# Patient Record
Sex: MALE | Race: WHITE | NOT HISPANIC OR LATINO | Employment: OTHER | URBAN - METROPOLITAN AREA
[De-identification: names, ages, dates, MRNs, and addresses within clinical notes are randomized per-mention and may not be internally consistent; named-entity substitution may affect disease eponyms.]

---

## 2017-01-17 ENCOUNTER — OFFICE VISIT (OUTPATIENT)
Dept: FAMILY MEDICINE | Facility: CLINIC | Age: 27
End: 2017-01-17
Payer: COMMERCIAL

## 2017-01-17 VITALS
SYSTOLIC BLOOD PRESSURE: 120 MMHG | DIASTOLIC BLOOD PRESSURE: 65 MMHG | WEIGHT: 222 LBS | RESPIRATION RATE: 18 BRPM | BODY MASS INDEX: 33.65 KG/M2 | TEMPERATURE: 97.7 F | HEIGHT: 68 IN | HEART RATE: 70 BPM

## 2017-01-17 DIAGNOSIS — R22.2 MASS ON BACK: Primary | ICD-10-CM

## 2017-01-17 PROCEDURE — 99214 OFFICE O/P EST MOD 30 MIN: CPT | Performed by: FAMILY MEDICINE

## 2017-01-17 ASSESSMENT — ANXIETY QUESTIONNAIRES
7. FEELING AFRAID AS IF SOMETHING AWFUL MIGHT HAPPEN: NOT AT ALL
IF YOU CHECKED OFF ANY PROBLEMS ON THIS QUESTIONNAIRE, HOW DIFFICULT HAVE THESE PROBLEMS MADE IT FOR YOU TO DO YOUR WORK, TAKE CARE OF THINGS AT HOME, OR GET ALONG WITH OTHER PEOPLE: NOT DIFFICULT AT ALL
1. FEELING NERVOUS, ANXIOUS, OR ON EDGE: NOT AT ALL
2. NOT BEING ABLE TO STOP OR CONTROL WORRYING: NOT AT ALL
GAD7 TOTAL SCORE: 0
5. BEING SO RESTLESS THAT IT IS HARD TO SIT STILL: NOT AT ALL
6. BECOMING EASILY ANNOYED OR IRRITABLE: NOT AT ALL
3. WORRYING TOO MUCH ABOUT DIFFERENT THINGS: NOT AT ALL

## 2017-01-17 ASSESSMENT — PATIENT HEALTH QUESTIONNAIRE - PHQ9: 5. POOR APPETITE OR OVEREATING: NOT AT ALL

## 2017-01-17 NOTE — MR AVS SNAPSHOT
After Visit Summary   1/17/2017    Marvin Alves    MRN: [id number]           Patient Information     Date Of Birth          1990        Visit Information        Provider Department      1/17/2017 3:40 PM Kannan Emery MD [facility]        Today's Diagnoses     Mass on back    -  1        Follow-ups after your visit        Additional Services     GENERAL SURG ADULT REFERRAL       [place] [facility] [place] [facility] [phone]   [email or web address]    Please be aware that coverage of these services is subject to the terms and limitations of your health insurance plan.  Call member services at your health plan with any benefit or coverage questions.      Please bring the following with you to your appointment:    (1) Any X-Rays, CTs or MRIs which have been performed.  Contact the facility where they were done to arrange for  prior to your scheduled appointment.   (2) List of current medications   (3) This referral request   (4) Any documents/labs given to you for this referral                  Who to contact     If you have questions or need follow up information about today's clinic visit or your schedule please contact [facility] directly at [phone].  Normal or non-critical lab and imaging results will be communicated to you by MyChart, letter or phone within 4 business days after the clinic has received the results. If you do not hear from us within 7 days, please contact the clinic through MyChart or phone. If you have a critical or abnormal lab result, we will notify you by phone as soon as possible.  Submit refill requests through TrumpIT or call your pharmacy and they will forward the refill request to us. Please allow 3 business days for your refill to be completed.          Additional Information About Your Visit        VTEXhart Information     TrumpIT lets you send  "messages to your doctor, view your test results, renew your prescriptions, schedule appointments and more. To sign up, go to [email or web address] . Click on \"Log in\" on the left side of the screen, which will take you to the Welcome page. Then click on \"Sign up Now\" on the right side of the page.     You will be asked to enter the access code listed below, as well as some personal information. Please follow the directions to create your username and password.     Your access code is: [email or web address]  Expires: 2017  3:58 PM     Your access code will  in 90 days. If you need help or a new code, please call your [place] clinic or [phone].        Care EveryWhere ID     This is your [facility] EveryWhere ID. This could be used by other organizations to access your [place] medical records  [id number]        Your Vitals Were     Pulse Temperature Respirations Height BMI (Body Mass Index)       70 97.7  F (36.5  C) 18 5' 8.25\" (1.734 m) 33.49 kg/m2        Blood Pressure from Last 3 Encounters:   17 120/65   16 128/76   16 120/72    Weight from Last 3 Encounters:   17 222 lb (100.699 kg)   16 228 lb (103.42 kg)   16 231 lb 11.2 oz (105.098 kg)              We Performed the Following     GENERAL SURG ADULT REFERRAL          Today's Medication Changes          These changes are accurate as of: 17  4:03 PM.  If you have any questions, ask your nurse or doctor.               Stop taking these medicines if you haven't already. Please contact your care team if you have questions.     escitalopram 10 MG tablet   Commonly known as:  LEXAPRO   Stopped by:  Kannan Emery MD           LORazepam 1 MG tablet   Commonly known as:  ATIVAN   Stopped by:  Kannan Emery MD                    Primary Care Provider Office Phone # Fax #    Amelia Mayra Howard [phone]       [address] [address]        Thank you!     Thank you " for choosing [facility]  for your care. Our goal is always to provide you with excellent care. Hearing back from our patients is one way we can continue to improve our services. Please take a few minutes to complete the written survey that you may receive in the mail after your visit with us. Thank you!             Your Updated Medication List - Protect others around you: Learn how to safely use, store and throw away your medicines at www.disposemymeds.org.          This list is accurate as of: 1/17/17  4:03 PM.  Always use your most recent med list.                   Brand Name Dispense Instructions for use    NO ACTIVE MEDICATIONS      .

## 2017-01-17 NOTE — PROGRESS NOTES
SUBJECTIVE:                                                    Marvin Alves is a 26 year old male who presents to clinic today for the following health issues:      Chief Complaint   Patient presents with     Derm Problem     patient has a lump on his left shoulder blade he first noticed the lump about 5 days ago .             Problem list and histories reviewed & adjusted, as indicated.  Additional history:     Further history obtained, clarified or corrected by physician:  He has a lump on the left upper back which he thinks is new. He does get a little tingling in the back and shoulder and arm area which he thinks is from this.    OBJECTIVE:  LUNGS: clear to auscultation, normal breath sounds  CV: RRR without murmur  ABD: BS+, soft, nontender, no masses, no hepatosplenomegaly  BACK: he left upper back just medial to the scapula reveals a subcutaneous mass that is smooth and firm and mobile. It is about 3 cm in diameter.    ASSESSMENT:  Mass on back    PLAN:  Orders Placed This Encounter     GENERAL SURG ADULT REFERRAL

## 2017-01-18 ASSESSMENT — PATIENT HEALTH QUESTIONNAIRE - PHQ9: SUM OF ALL RESPONSES TO PHQ QUESTIONS 1-9: 0

## 2017-01-18 ASSESSMENT — ANXIETY QUESTIONNAIRES: GAD7 TOTAL SCORE: 0

## 2017-01-27 ENCOUNTER — OFFICE VISIT (OUTPATIENT)
Dept: SURGERY | Facility: CLINIC | Age: 27
End: 2017-01-27
Payer: COMMERCIAL

## 2017-01-27 VITALS
HEIGHT: 69 IN | BODY MASS INDEX: 32.58 KG/M2 | DIASTOLIC BLOOD PRESSURE: 74 MMHG | SYSTOLIC BLOOD PRESSURE: 124 MMHG | WEIGHT: 220 LBS | TEMPERATURE: 97.9 F | HEART RATE: 62 BPM

## 2017-01-27 DIAGNOSIS — D17.1 LIPOMA OF BACK: Primary | ICD-10-CM

## 2017-01-27 PROCEDURE — 99202 OFFICE O/P NEW SF 15 MIN: CPT | Performed by: SURGERY

## 2017-01-27 NOTE — MR AVS SNAPSHOT
"              After Visit Summary   1/27/2017    Marvin Alves    MRN: [id number]           Patient Information     Date Of Birth          1990        Visit Information        Provider Department      1/27/2017 8:30 AM Talat Nava MD [facility]        Care Instructions    Per Dr. Nava's instructions        Follow-ups after your visit        Your next 10 appointments already scheduled     Jan 27, 2017  8:30 AM   [facility] Visit with [facility]   [facility]    [address] [id number] [phone]              Who to contact     If you have questions or need follow up information about today's clinic visit or your schedule please contact [facility] directly at [phone].  Normal or non-critical lab and imaging results will be communicated to you by MyChart, letter or phone within 4 business days after the clinic has received the results. If you do not hear from us within 7 days, please contact the clinic through MyChart or phone. If you have a critical or abnormal lab result, we will notify you by phone as soon as possible.  Submit refill requests through leemail or call your pharmacy and they will forward the refill request to us. Please allow 3 business days for your refill to be completed.          Additional Information About Your Visit        MyChart Information     leemail lets you send messages to your doctor, view your test results, renew your prescriptions, schedule appointments and more. To sign up, go to [email or web address] . Click on \"Log in\" on the left side of the screen, which will take you to the Welcome page. Then click on \"Sign up Now\" on the right side of the page.     You will be asked to enter the access code listed below, as well as some personal information. Please follow the directions to create your username and password.     Your access code is: [email or web address]  Expires: 4/17/2017  3:58 " "PM     Your access code will  in 90 days. If you need help or a new code, please call your [place] clinic or [phone].        Care EveryWhere ID     This is your Care EveryWhere ID. This could be used by other organizations to access your [place] medical records  [id number]        Your Vitals Were     Pulse Temperature Height BMI (Body Mass Index)          62 97.9  F (36.6  C) (Oral) 1.753 m (5' 9\") 32.47 kg/m2         Blood Pressure from Last 3 Encounters:   17 124/74   17 120/65   16 128/76    Weight from Last 3 Encounters:   17 99.791 kg (220 lb)   17 100.699 kg (222 lb)   16 103.42 kg (228 lb)              Today, you had the following     No orders found for display       Primary Care Provider Office Phone # Fax #    Amelia Mayra Howard [phone]       [facility] [address] [address] [facility] [address]        Thank you!     Thank you for choosing [facility]  for your care. Our goal is always to provide you with excellent care. Hearing back from our patients is one way we can continue to improve our services. Please take a few minutes to complete the written survey that you may receive in the mail after your visit with us. Thank you!             Your Updated Medication List - Protect others around you: Learn how to safely use, store and throw away your medicines at www.disposemymeds.org.          This list is accurate as of: 17  8:29 AM.  Always use your most recent med list.                   Brand Name Dispense Instructions for use    NO ACTIVE MEDICATIONS      .         "

## 2017-01-27 NOTE — PROGRESS NOTES
"26-year-old male complaining of left mid back mass  Mass was discovered by patient's chiropractor. Patient does not know how long it has been there. He denies fevers chills nausea and vomiting.    Exam:/74 mmHg  Pulse 62  Temp(Src) 97.9  F (36.6  C) (Oral)  Ht 1.753 m (5' 9\")  Wt 99.791 kg (220 lb)  BMI 32.47 kg/m2  Left back level of T4 just left of spine soft mobile 4 cm subcutaneous mass,, nontender with no skin changes      Assessment and plan:  26-year-old male with what appears to feel like a lipoma. Unfortunately, this is deep enough and large enough that I would Recommend taking it out  In the operating room  Under light sedation. I discussed this with patient and and would like to delay this  If necessary. I explained that there is A little low likelihood that this is malignant. The patient would like to keep an eye on it and return to clinic if it changes.    Talat Nava MD   "

## 2017-01-27 NOTE — NURSING NOTE
"Initial /74 mmHg  Pulse 62  Temp(Src) 97.9  F (36.6  C) (Oral)  Ht 1.753 m (5' 9\")  Wt 99.791 kg (220 lb)  BMI 32.47 kg/m2 Estimated body mass index is 32.47 kg/(m^2) as calculated from the following:    Height as of this encounter: 1.753 m (5' 9\").    Weight as of this encounter: 99.791 kg (220 lb). .    Rocio Serna MA    "

## 2017-03-23 ENCOUNTER — OFFICE VISIT (OUTPATIENT)
Dept: FAMILY MEDICINE | Facility: CLINIC | Age: 27
End: 2017-03-23
Payer: COMMERCIAL

## 2017-03-23 VITALS
BODY MASS INDEX: 32.58 KG/M2 | DIASTOLIC BLOOD PRESSURE: 68 MMHG | TEMPERATURE: 97.1 F | HEART RATE: 69 BPM | SYSTOLIC BLOOD PRESSURE: 123 MMHG | WEIGHT: 220 LBS | HEIGHT: 69 IN

## 2017-03-23 DIAGNOSIS — J02.9 VIRAL PHARYNGITIS: ICD-10-CM

## 2017-03-23 DIAGNOSIS — R07.0 THROAT PAIN: Primary | ICD-10-CM

## 2017-03-23 LAB
DEPRECATED S PYO AG THROAT QL EIA: NORMAL
MICRO REPORT STATUS: NORMAL
SPECIMEN SOURCE: NORMAL

## 2017-03-23 PROCEDURE — 99213 OFFICE O/P EST LOW 20 MIN: CPT | Performed by: INTERNAL MEDICINE

## 2017-03-23 PROCEDURE — 87880 STREP A ASSAY W/OPTIC: CPT | Performed by: INTERNAL MEDICINE

## 2017-03-23 PROCEDURE — 87081 CULTURE SCREEN ONLY: CPT | Performed by: INTERNAL MEDICINE

## 2017-03-23 NOTE — MR AVS SNAPSHOT
After Visit Summary   3/23/2017    Marvin Alves    MRN: [id number]           Patient Information     Date Of Birth          1990        Visit Information        Provider Department      3/23/2017 3:40 PM Tatiana Karimi, DO [facility]        Today's Diagnoses     Throat pain    -  1      Care Instructions          Thank you for choosing [facility].  You may be receiving a survey in the mail from Emili Perdue regarding your visit today.  Please take a few minutes to complete and return the survey to let us know how we are doing.      If you have questions or concerns, please contact us via Profusa or you can contact your care team at [phone].    Our Clinic hours are:  Monday 6:40 am  to 7:00 pm  Tuesday -Friday 6:40 am to 5:00 pm    The [place] outpatient lab hours are:  Monday - Friday 6:10 am to 4:45 pm  Saturdays 7:00 am to 11:00 am  Appointments are required, call [phone]    If you have clinical questions after hours or would like to schedule an appointment,  call the clinic at [phone].      [address]        You have a cold, which is a virus.  Antibiotics treat bacterial infections and not viral infections.  They will not cure or shorten a cold.  The main way to feel better is rest, hydration, good nutrition.  You can try some of the following over the counter medicines:    --For cough - try Robitussin DM or Mucinex DM (Acitve ingredients Guaifenesin and dextromethorphan)  --For nasal congestion, try saline nasal spray (Ocean brand or similar.  NOT Afrin)  --For sore throat and cough, try Chloraseptic spray, cough drops, warm salt water gargles or tea with honey.    --Use a humidifier at night  --For body aches and pains and fever, try acetaminophen or ibuprofen                          Follow-ups after your visit        Who to contact     If you have questions or need follow up information about today's clinic  "visit or your schedule please contact [facility] directly at [phone].  Normal or non-critical lab and imaging results will be communicated to you by MyChart, letter or phone within 4 business days after the clinic has received the results. If you do not hear from us within 7 days, please contact the clinic through NextCloudhart or phone. If you have a critical or abnormal lab result, we will notify you by phone as soon as possible.  Submit refill requests through Screen or call your pharmacy and they will forward the refill request to us. Please allow 3 business days for your refill to be completed.          Additional Information About Your Visit        MyChart Information     Screen lets you send messages to your doctor, view your test results, renew your prescriptions, schedule appointments and more. To sign up, go to [email or web address] . Click on \"Log in\" on the left side of the screen, which will take you to the Welcome page. Then click on \"Sign up Now\" on the right side of the page.     You will be asked to enter the access code listed below, as well as some personal information. Please follow the directions to create your username and password.     Your access code is: [email or web address]  Expires: 2017  4:58 PM     Your access code will  in 90 days. If you need help or a new code, please call your [place] clinic or [phone].        Care EveryWhere ID     This is your Care EveryWhere ID. This could be used by other organizations to access your [place] medical records  [id number]        Your Vitals Were     Pulse Temperature Height BMI (Body Mass Index)          69 97.1  F (36.2  C) (Tympanic) 5' 9\" (1.753 m) 32.49 kg/m2         Blood Pressure from Last 3 Encounters:   17 123/68   17 124/74   17 120/65    Weight from Last 3 Encounters:   17 220 lb (99.8 kg)   17 220 lb (99.8 kg)   17 222 lb (100.7 kg)              We Performed the Following  "    Beta strep group A culture     Strep, Rapid Screen        Primary Care Provider Office Phone # Fax #    Amelia Howard [phone]       [facility] [address] [facility] [address]        Thank you!     Thank you for choosing [facility]  for your care. Our goal is always to provide you with excellent care. Hearing back from our patients is one way we can continue to improve our services. Please take a few minutes to complete the written survey that you may receive in the mail after your visit with us. Thank you!             Your Updated Medication List - Protect others around you: Learn how to safely use, store and throw away your medicines at www.disposemymeds.org.          This list is accurate as of: 3/23/17  4:11 PM.  Always use your most recent med list.                   Brand Name Dispense Instructions for use    NO ACTIVE MEDICATIONS      .

## 2017-03-23 NOTE — LETTER
[facility]  [address] [address]  Phone: [phone]    March 29, 2017    Marvin Alves  [address] [place] [id number]              Dear Mr. Alves,      The results of your recent throat culture were negative.  If you have any further questions or concerns please contact the clinic              Sincerely,      Tatiana Karimi MD / bob

## 2017-03-23 NOTE — PROGRESS NOTES
"  SUBJECTIVE:                                                    Marvin Alves is a 26 year old male who presents to clinic today for the following health issues:      ENT Symptoms             Symptoms: cc Present Absent Comment   Fever/Chills  x     Fatigue  x     Muscle Aches  x     Eye Irritation   x    Sneezing   x    Nasal Mo/Drg  x  dry   Sinus Pressure/Pain  x  Head aches   Loss of smell   x    Dental pain   x    Sore Throat  x  Right side   Swollen Glands  x     Ear Pain/Fullness   x    Cough  x  Productive green yellow   Wheeze   x    Chest Pain  x  yeserday   Shortness of breath  x     Rash   x    Other   x      Symptom duration:  2 days   Symptom severity:  same   Treatments tried:  nothing   Contacts:       Chief Complaint   Patient presents with     Pharyngitis     2 days   is concerned about strep.  Reports right sided swollen glands, sore throat, headache, increase in fatigue.  Symptoms started yesterday morning.  Both parents and a co-worker are sick.  Mild cough.  Feels 'labored' breathing.  +chills, no overt fevers.  Has not tried any OTC meds for symptoms.         Current Outpatient Prescriptions   Medication Sig Dispense Refill     NO ACTIVE MEDICATIONS .         Reviewed and updated as needed this visit by clinical staff       Reviewed and updated as needed this visit by Provider         ROS:  Constitutional, HEENT, cardiovascular, pulmonary, gi and gu systems are negative, except as otherwise noted.    OBJECTIVE:                                                    /68 (BP Location: Right arm, Patient Position: Chair, Cuff Size: Adult Large)  Pulse 69  Temp 97.1  F (36.2  C) (Tympanic)  Ht 5' 9\" (1.753 m)  Wt 220 lb (99.8 kg)  BMI 32.49 kg/m2  Body mass index is 32.49 kg/(m^2).  GENERAL APPEARANCE: healthy, alert, no distress and appears older than stated age, disheveled, malodorous  EYES: Eyes grossly normal to inspection, PERRL and conjunctivae and sclerae normal  HENT: ear canals " and TM's normal, moderate/severe tonsillar hypertrophy and tonsillar erythema without exudates  NECK: no adenopathy, no asymmetry, masses, or scars and thyroid normal to palpation  RESP: lungs clear to auscultation - no rales, rhonchi or wheezes  CV: regular rates and rhythm, normal S1 S2, no S3 or S4 and no murmur, click or rub    Diagnostic Test Results:  Results for orders placed or performed in visit on 03/23/17 (from the past 24 hour(s))   Strep, Rapid Screen   Result Value Ref Range    Specimen Description Throat     Rapid Strep A Screen       NEGATIVE: No Group A streptococcal antigen detected by immunoassay, await   culture report.      Micro Report Status FINAL 03/23/2017         ASSESSMENT/PLAN:                                                        ICD-10-CM    1. Throat pain R07.0 Strep, Rapid Screen     Beta strep group A culture   2. Viral pharyngitis J02.9        You have a cold, which is a virus.  Antibiotics treat bacterial infections and not viral infections.  They will not cure or shorten a cold.  The main way to feel better is rest, hydration, good nutrition.  You can try some of the following over the counter medicines:    --For cough - try Robitussin DM or Mucinex DM (Acitve ingredients Guaifenesin and dextromethorphan)  --For nasal congestion, try saline nasal spray (Ocean brand or similar.  NOT Afrin)  --For sore throat and cough, try Chloraseptic spray, cough drops, warm salt water gargles or tea with honey.    --Use a humidifier at night  --For body aches and pains and fever, try acetaminophen or ibuprofen          Tatiana Karimi,   [facility]

## 2017-03-23 NOTE — NURSING NOTE
"Chief Complaint   Patient presents with     Pharyngitis     2 days       Initial /68 (BP Location: Right arm, Patient Position: Chair, Cuff Size: Adult Large)  Pulse 69  Temp 97.1  F (36.2  C) (Tympanic)  Ht 5' 9\" (1.753 m)  Wt 220 lb (99.8 kg)  BMI 32.49 kg/m2 Estimated body mass index is 32.49 kg/(m^2) as calculated from the following:    Height as of this encounter: 5' 9\" (1.753 m).    Weight as of this encounter: 220 lb (99.8 kg).  Medication Reconciliation: complete  "

## 2017-03-23 NOTE — PATIENT INSTRUCTIONS
Thank you for choosing [facility].  You may be receiving a survey in the mail from Emili Perdue regarding your visit today.  Please take a few minutes to complete and return the survey to let us know how we are doing.      If you have questions or concerns, please contact us via GalaDo or you can contact your care team at [phone].    Our Clinic hours are:  Monday 6:40 am  to 7:00 pm  Tuesday -Friday 6:40 am to 5:00 pm    The [place] outpatient lab hours are:  Monday - Friday 6:10 am to 4:45 pm  Saturdays 7:00 am to 11:00 am  Appointments are required, call [phone]    If you have clinical questions after hours or would like to schedule an appointment,  call the clinic at [phone].      [address]        You have a cold, which is a virus.  Antibiotics treat bacterial infections and not viral infections.  They will not cure or shorten a cold.  The main way to feel better is rest, hydration, good nutrition.  You can try some of the following over the counter medicines:    --For cough - try Robitussin DM or Mucinex DM (Acitve ingredients Guaifenesin and dextromethorphan)  --For nasal congestion, try saline nasal spray (Ocean brand or similar.  NOT Afrin)  --For sore throat and cough, try Chloraseptic spray, cough drops, warm salt water gargles or tea with honey.    --Use a humidifier at night  --For body aches and pains and fever, try acetaminophen or ibuprofen

## 2017-03-25 LAB
BACTERIA SPEC CULT: NORMAL
MICRO REPORT STATUS: NORMAL
SPECIMEN SOURCE: NORMAL

## 2017-11-10 ENCOUNTER — RADIANT APPOINTMENT (OUTPATIENT)
Dept: GENERAL RADIOLOGY | Facility: CLINIC | Age: 27
End: 2017-11-10
Attending: FAMILY MEDICINE
Payer: MEDICAID

## 2017-11-10 ENCOUNTER — OFFICE VISIT (OUTPATIENT)
Dept: FAMILY MEDICINE | Facility: CLINIC | Age: 27
End: 2017-11-10
Payer: MEDICAID

## 2017-11-10 VITALS
BODY MASS INDEX: 27.91 KG/M2 | WEIGHT: 189 LBS | RESPIRATION RATE: 18 BRPM | DIASTOLIC BLOOD PRESSURE: 75 MMHG | TEMPERATURE: 97.6 F | HEART RATE: 69 BPM | SYSTOLIC BLOOD PRESSURE: 138 MMHG

## 2017-11-10 DIAGNOSIS — R07.89 ATYPICAL CHEST PAIN: ICD-10-CM

## 2017-11-10 DIAGNOSIS — F41.9 ANXIETY: ICD-10-CM

## 2017-11-10 DIAGNOSIS — R07.89 ATYPICAL CHEST PAIN: Primary | ICD-10-CM

## 2017-11-10 PROCEDURE — 99214 OFFICE O/P EST MOD 30 MIN: CPT | Performed by: FAMILY MEDICINE

## 2017-11-10 PROCEDURE — 71020 XR CHEST 2 VW: CPT

## 2017-11-10 PROCEDURE — 93000 ELECTROCARDIOGRAM COMPLETE: CPT | Performed by: FAMILY MEDICINE

## 2017-11-10 ASSESSMENT — ANXIETY QUESTIONNAIRES
IF YOU CHECKED OFF ANY PROBLEMS ON THIS QUESTIONNAIRE, HOW DIFFICULT HAVE THESE PROBLEMS MADE IT FOR YOU TO DO YOUR WORK, TAKE CARE OF THINGS AT HOME, OR GET ALONG WITH OTHER PEOPLE: EXTREMELY DIFFICULT
3. WORRYING TOO MUCH ABOUT DIFFERENT THINGS: NEARLY EVERY DAY
6. BECOMING EASILY ANNOYED OR IRRITABLE: NEARLY EVERY DAY
2. NOT BEING ABLE TO STOP OR CONTROL WORRYING: NEARLY EVERY DAY
5. BEING SO RESTLESS THAT IT IS HARD TO SIT STILL: NEARLY EVERY DAY
GAD7 TOTAL SCORE: 21
7. FEELING AFRAID AS IF SOMETHING AWFUL MIGHT HAPPEN: NEARLY EVERY DAY
1. FEELING NERVOUS, ANXIOUS, OR ON EDGE: NEARLY EVERY DAY

## 2017-11-10 ASSESSMENT — PATIENT HEALTH QUESTIONNAIRE - PHQ9
SUM OF ALL RESPONSES TO PHQ QUESTIONS 1-9: 16
5. POOR APPETITE OR OVEREATING: NEARLY EVERY DAY

## 2017-11-10 NOTE — MR AVS SNAPSHOT
After Visit Summary   11/10/2017    Marvin Alves    MRN: [id number]           Patient Information     Date Of Birth          1990        Visit Information        Provider Department      11/10/2017 1:20 PM Kannan Emery MD [facility]        Today's Diagnoses     Atypical chest pain    -  1    Anxiety          Care Instructions          Thank you for choosing [facility].  You may be receiving a survey in the mail from [employer] regarding your visit today.  Please take a few minutes to complete and return the survey to let us know how we are doing.      Our Clinic hours are:  Mondays    7:20 am - 7 pm  Tues -  Fri  7:20 am - 5 pm    Clinic Phone: [phone]    The clinic lab opens at 7:30 am Mon - Fri and appointments are required.    [facility]. [phone]  Monday-Thursday 8 am - 7pm  Tues/Wed/Fri 8 am - 5:30 pm                 Follow-ups after your visit        Additional Services     MENTAL HEALTH REFERRAL       Your provider has referred you to: [facility]: [facility] - [facility]  [phone]   [email or web address]   *Referral from [facility] Primary Care Provider required - Consultation Model - medication management & future refills will be returned to [facility] PCP upon completion of evaluation  *Please call to schedule an appointment.    All scheduling is subject to the client's specific insurance plan & benefits, provider/location availability, and provider clinical specialities.  Please arrive 15 minutes early for your first appointment and bring your completed paperwork.    Please be aware that coverage of these services is subject to the terms and limitations of your health insurance plan.  Call member services at your health plan with any benefit or coverage questions.                  Who to contact     If you have questions or need follow up  "information about today's clinic visit or your schedule please contact [facility] directly at [phone].  Normal or non-critical lab and imaging results will be communicated to you by MyChart, letter or phone within 4 business days after the clinic has received the results. If you do not hear from us within 7 days, please contact the clinic through [employer] or phone. If you have a critical or abnormal lab result, we will notify you by phone as soon as possible.  Submit refill requests through [employer] or call your pharmacy and they will forward the refill request to us. Please allow 3 business days for your refill to be completed.          Additional Information About Your Visit        [facility] Information     [employer] lets you send messages to your doctor, view your test results, renew your prescriptions, schedule appointments and more. To sign up, go to [email or web address] . Click on \"Log in\" on the left side of the screen, which will take you to the Welcome page. Then click on \"Sign up Now\" on the right side of the page.     You will be asked to enter the access code listed below, as well as some personal information. Please follow the directions to create your username and password.     Your access code is: [email or web address]  Expires: 2018  2:17 PM     Your access code will  in 90 days. If you need help or a new code, please call your [place] clinic or [phone].        Care EveryWhere ID     This is your Care EveryWhere ID. This could be used by other organizations to access your [place] medical records  [id number]        Your Vitals Were     Pulse Temperature Respirations BMI (Body Mass Index)          69 97.6  F (36.4  C) 18 27.91 kg/m2         Blood Pressure from Last 3 Encounters:   11/10/17 138/75   17 123/68   17 124/74    Weight from Last 3 Encounters:   11/10/17 189 lb (85.7 kg)   17 220 lb (99.8 kg)   17 220 lb (99.8 kg)              We " Performed the Following     EKG 12-lead complete w/read - Clinics     MENTAL HEALTH REFERRAL        Primary Care Provider Office Phone # Fax #    Amelia Howard [phone] [address] [facility] [address]        Equal Access to Services     JAMES PEG : Hadii aad ku hadasho Soomaali, waaxda luqadaha, qaybta kaalmada adeegyada, waxay murrayin haybrittanyn adejani vikiadrian lajim fletcher. So [facility] [phone].    ATENCIÓN: Si habla español, tiene a regalado disposición servicios gratuitos de asistencia lingüística. Llame al [phone].    We comply with applicable federal civil rights laws and [place] laws. We do not discriminate on the basis of race, color, national origin, age, disability, sex, sexual orientation, or gender identity.            Thank you!     Thank you for choosing [facility]  for your care. Our goal is always to provide you with excellent care. Hearing back from our patients is one way we can continue to improve our services. Please take a few minutes to complete the written survey that you may receive in the mail after your visit with us. Thank you!             Your Updated Medication List - Protect others around you: Learn how to safely use, store and throw away your medicines at www.disposemymeds.org.          This list is accurate as of: 11/10/17  3:21 PM.  Always use your most recent med list.                   Brand Name Dispense Instructions for use Diagnosis    NO ACTIVE MEDICATIONS      .

## 2017-11-10 NOTE — PATIENT INSTRUCTIONS
Thank you for choosing [facility].  You may be receiving a survey in the mail from [facility] regarding your visit today.  Please take a few minutes to complete and return the survey to let us know how we are doing.      Our Clinic hours are:  Mondays    7:20 am - 7 pm  Tues -  Fri  7:20 am - 5 pm    Clinic Phone: [phone]    The clinic lab opens at 7:30 am Mon - Fri and appointments are required.    [facility]. [phone]  Monday-Thursday 8 am - 7pm  Tues/Wed/Fri 8 am - 5:30 pm

## 2017-11-10 NOTE — PROGRESS NOTES
SUBJECTIVE:   Marvin Alves is a 27 year old male who presents to clinic today for the following health issues:      Depression and Anxiety Follow-Up    Status since last visit: Worsened     Other associated symptoms: panic and chest pain     Complicating factors:     Significant life event: No     Current substance abuse: None    PHQ-9 Score and MyChart F/U Questions 9/22/2016 1/17/2017 11/10/2017   Total Score 5 0 16   Q9: Suicide Ideation Not at all Not at all Several days     KWASI-7 SCORE 9/28/2016 1/17/2017 11/10/2017   Total Score 7 0 21       PHQ-9  English  PHQ-9   Any Language  GAD7  Suicide Assessment Five-step Evaluation and Treatment (SAFE-T)      Amount of exercise or physical activity: 2-3 days/week for an average of 30-45 minutes    Problems taking medications regularly: No    Medication side effects: none    Diet: regular (no restrictions)            Problem list and histories reviewed & adjusted, as indicated.  Additional history: as documented        Reviewed and updated as needed this visit by clinical staffTobacco  Allergies  Med Hx  Surg Hx  Fam Hx  Soc Hx      Reviewed and updated as needed this visit by Provider      Further history obtained, clarified or corrected by physician:  He says he continues to have anxiety which he does not think is controlled. He feels he is not having success with lifestyle management and does agree that perhaps psychology referral would be appropriate. In addition he's had chest pain again which he thinks is different than what he has had in the past. Specifically had 2 very quick episodes of sharp chest pain 2 days ago followed by just a general achiness. There was no significant associated symptoms. He is quite convinced this is a problem with heart or lungs.    OBJECTIVE:  /75  Pulse 69  Temp 97.6  F (36.4  C)  Resp 18  Wt 189 lb (85.7 kg)  BMI 27.91 kg/m2  LUNGS: clear to auscultation, normal breath sounds  CV: RRR without murmur  ABD: BS+,  soft, nontender, no masses, no hepatosplenomegaly  EXTREMITIES: without joint tenderness, swelling or erythema.  No muscle tenderness or abnormality.  SKIN: No rashes or abnormalities  NEURO:non focal exam    ECG: NSR, no acute changes    CXR: Negative    ASSESSMENT:     Atypical chest pain  Anxiety    PLAN:  Reassurance  Recommend setting up psychology consultation.  Orders Placed This Encounter     XR Chest 2 Views     MENTAL HEALTH REFERRAL

## 2017-11-11 ASSESSMENT — ANXIETY QUESTIONNAIRES: GAD7 TOTAL SCORE: 21

## 2018-01-16 ENCOUNTER — OFFICE VISIT (OUTPATIENT)
Dept: PSYCHOLOGY | Facility: CLINIC | Age: 28
End: 2018-01-16
Payer: COMMERCIAL

## 2018-01-16 DIAGNOSIS — F32.2 MAJOR DEPRESSIVE DISORDER, SINGLE EPISODE, SEVERE WITH ANXIOUS DISTRESS (H): ICD-10-CM

## 2018-01-16 DIAGNOSIS — F41.1 GENERALIZED ANXIETY DISORDER: Primary | ICD-10-CM

## 2018-01-16 PROCEDURE — 90791 PSYCH DIAGNOSTIC EVALUATION: CPT | Performed by: SOCIAL WORKER

## 2018-01-16 ASSESSMENT — ANXIETY QUESTIONNAIRES
3. WORRYING TOO MUCH ABOUT DIFFERENT THINGS: NEARLY EVERY DAY
GAD7 TOTAL SCORE: 21
1. FEELING NERVOUS, ANXIOUS, OR ON EDGE: NEARLY EVERY DAY
2. NOT BEING ABLE TO STOP OR CONTROL WORRYING: NEARLY EVERY DAY
7. FEELING AFRAID AS IF SOMETHING AWFUL MIGHT HAPPEN: NEARLY EVERY DAY
6. BECOMING EASILY ANNOYED OR IRRITABLE: NEARLY EVERY DAY
5. BEING SO RESTLESS THAT IT IS HARD TO SIT STILL: NEARLY EVERY DAY
IF YOU CHECKED OFF ANY PROBLEMS ON THIS QUESTIONNAIRE, HOW DIFFICULT HAVE THESE PROBLEMS MADE IT FOR YOU TO DO YOUR WORK, TAKE CARE OF THINGS AT HOME, OR GET ALONG WITH OTHER PEOPLE: VERY DIFFICULT

## 2018-01-16 ASSESSMENT — PATIENT HEALTH QUESTIONNAIRE - PHQ9
SUM OF ALL RESPONSES TO PHQ QUESTIONS 1-9: 25
5. POOR APPETITE OR OVEREATING: NEARLY EVERY DAY

## 2018-01-16 NOTE — MR AVS SNAPSHOT
"                  MRN:[id number]                      After Visit Summary   2018    Marvin Alves    MRN: [id number]           Visit Information        Provider Department      2018 11:30 AM Elissa Mckeon, [facility] Generic      Your next 10 appointments already scheduled     2018  1:30 PM CST   Return Visit with Elissa Grace [facility]    [address] [place] [id number]   [phone]            2018  1:30 PM CST   Return Visit with Elissa Mckeon [facility] ([facility]    [address] [place] [address]   [phone]              MyChart Information     [employer] lets you send messages to your doctor, view your test results, renew your prescriptions, schedule appointments and more. To sign up, go to [email or web address] . Click on \"Log in\" on the left side of the screen, which will take you to the Welcome page. Then click on \"Sign up Now\" on the right side of the page.     You will be asked to enter the access code listed below, as well as some personal information. Please follow the directions to create your username and password.     Your access code is: [email or web address]  Expires: 2018  2:17 PM     Your access code will  in 90 days. If you need help or a new code, please call your [place] clinic or [phone].        Care EveryWhere ID     This is your Care EveryWhere ID. This could be used by other organizations to access your [place] medical records  [id number]        Equal Access to Services     OCTAVIA RUVALCABA : Steven Morales, wanubiada luqadaha, qaybta kaalmada adeegyada, corin fletcher. So [facility] [phone].    ATENCIÓN: Si habla español, tiene a regalado disposición servicios gratuitos de asistencia lingüística. Llame al [phone].    We comply " with applicable federal civil rights laws and [place] laws. We do not discriminate on the basis of race, color, national origin, age, disability, sex, sexual orientation, or gender identity.

## 2018-01-16 NOTE — PROGRESS NOTES
Adult Intake Structured Interview  Standard Diagnostic Assessment      CLIENT'S NAME: Marvin Alves  MRN:   [id number]  :   1990  ACCT. NUMBER: [id number]  DATE OF SERVICE: 18      Identifying Information:  The client is a [age] year old, , single male who resides with his mother, his father and his older brother in a home in [place], [place]. The client is currently unemployed. He was referred for individual therapy by his primary care physician, Dr. Kannan Emery. The client attended the session alone.     Client's Statement of Presenting Concern:  The client reported the reason for seeking therapy at this time is his lack of interest or ability to function in life. He indicated that he is not employed and that he has no motivation to do anything at all. The client stated that his symptoms have resulted in the following functional impairments: management of the household and or completion of tasks, money management, relationship(s), self-care, social interactions and work / vocational responsibilities.    History of Presenting Concern:  The client reported that he began experiencing symptoms of anxiety approximately a year and a half ago. He indicated that he began experiencing symptoms of depression shortly thereafter. He stated that, in an effort to manage the symptoms, he has become sober and he has exercised regularly. Aside from his primary care physician, the client reported that other professional(s) are not involved in providing mental health support / services.     Social History:  The client reported that he grew up in [place], [place]. He indicated that he still lives with his mother and his father. He is the second of two boys. He stated that he has has a twenty eight year old brother who also resides in the home.  "When asked about his childhood, he described it as \"easy-going and mostly loving\". He reported that his current relationships with his brother and his parents are \"good, but distant\".      The client reported a history of no committed relationships or marriages. He stated that he has no children.     The client identified few stable and meaningful social connections.     The client reported that he has not been involved with the legal system.     The client stated that he attended [employer] until he was a freshman. He then participated in an [employer] school, [employer]. He stated that he eventually earned his G.E.D. The client did identify the following learning problems: attention, concentration and reading.     There are no ethnic, cultural or Confucianism factors that may be relevant for therapy. The client identified his preferred language to be English. The client reported that he does not need the assistance of an  or other support involved in therapy. Modifications will not be used to assist communication in therapy.     The client did not serve in the .     The client reported a family medical history that includes diabetes in his father, maternal grandmother, and mother and hypertension in his mother.    Mental Health History:  The client reported that his father has been diagnosed with depression. He stated that his older brother and an uncle have been diagnosed with bipolar disorder. He denied any other known history of mental illness in his immediate or extended family. The client also reported that he believes that his paternal grandfather killed his paternal grandmother and then committed suicide. He indicated that his father was raised by his father's grandparents. He stated that he is unsure about the specifics because the incident is not discussed in his family.    The client reported that he has never participated in individual therapy in the past. He stated that he " has been diagnosed with depression and anxiety. He denied ever being hospitalized for mental health reasons. Aside from his primary care physician, the client is not currently receiving any additional mental health services.    Chemical Health History:  The client reported that his father is a recovering alcoholic. He indicated that his father participated in a twelve step program and stopped drinking prior to the client's birth.     The client stated that, up until approximately a year and a half ago, he was drinking to the point of having blackouts daily. He stated that he stopped drinking cold turkey after he was told how the alcohol was affecting his physical health. He reported that his consumption of alcohol was causing employment problems, financial problems and relationship problems. He indicated that he is now sober from both alcohol and marijuana, which he also used regularly in the past. The client has not received chemical dependency treatment in the past. The client is not currently receiving any chemical dependency treatment.     Client Reports:  Client denies using alcohol.  Client reports using tobacco 10 to 20 times per day. Client started using tobacco at age [age] years old.  Client denies using marijuana.  Client denies using caffeine.  Client denies using street drugs.  Client denies the non-medical use of prescription or over the counter drugs.    CAGE: G     Patient felt bad or GUILTY about their drinking (or drug use).   Based on the negative Cage-Aid score and clinical interview, there are no current indications of drug or alcohol abuse.    Discussed the general effects of drugs and alcohol on health and well-being.       Significant Losses / Trauma / Abuse / Neglect Issues:  The client denied any significant history of losses, trauma, abuse or neglect.     Issues of possible neglect are not present.      Medical Issues:  The client has had a physical examination to rule out medical  causes for current symptoms. He indicated that his last physical examination was a month or two ago. The client's primary care physician is Dr. Kannan Emery. The client stated that he does not have a psychiatrist and that he would prefer to address the symptoms that he is experiencing behaviorally, rather than taking psychotropic medications. The client reported that he does experience back pain, chest pain, nerve pain and headaches. He stated that the pain is chronic and severe. The client indicated that he is concerned about his weight and eating habits. He stated that he is not as healthy as he would like to be.    The client reported not having any current medications.    Client Allergies:  The client reported having no known allergies.    Medical History:  History reviewed. No pertinent past medical history.      Medication Adherence:  N/A - The client does not have prescribed psychiatric medications.    Mental Status Assessment:  Appearance:   Appropriate   Eye Contact:   Fair   Psychomotor Behavior: Restless   Attitude:   Cooperative   Orientation:   All  Speech   Rate / Production: Normal    Volume:  Normal   Mood:    Anxious   Affect:    Appropriate   Thought Content:  Clear   Thought Form:  Coherent  Logical   Insight:    Fair       Review of Symptoms:  Depression: Sleep Interest Energy Concentration Appetite Psychomotor Slowing Worthlessness Depressed Mood and Periodic Passive Thoughts of Suicide  Sisi:  No symptoms  Psychosis: No symptoms  Anxiety: Worries Nervousness  Panic:  Shortness of Breath Tingling Sense of Impending Doom  Post Traumatic Stress Disorder: No symptoms  Obsessive Compulsive Disorder: No symptoms  Eating Disorder: No symptoms  Oppositional Defiant Disorder: No symptoms  ADD / ADHD: No symptoms  Conduct Disorder: No symptoms      Safety Assessment:    History of Safety Concerns:   Client reported that he does experience periodic suicidal ideation. He indicated that he has never had a  specific suicide plan or intent.  Client denied a history of suicide attempts.    Client reported that he has, in the distant past, had thoughts about hitting someone with his car. He indicated that he has never had a specific homicide plan or intent.  Client denied a history of self-injurious ideation and behaviors.    Client denied a history of personal safety concerns.    Client denied a history of assaultive behaviors.        Current Safety Concerns:  Client denies current suicidal ideation.    Client denies current homicidal ideation and behaviors.  Client denies current self-injurious ideation and behaviors.    Client denies current concerns for personal safety.      Client reported that there are firearms in the house. The firearms are secured in a locked space.     Plan for Safety and Risk Management:  A safety and risk management plan has not been developed at this time, however client was given the after-hours number / 911 should there be a change in any of these risk factors.    Client's Strengths and Limitations:  The client identified the following strengths or resources that will help him succeed in counseling: family support, intelligence and having a creative outlet. The client identified the following supports: family and friends. Things that may interfere with the client's success in counseling include: few friends, financial hardship and lack of social support.      Diagnostic Criteria:    Generalized Anxiety Disorder:  A. Excessive anxiety and worry about a number of events or activities.   B. The person finds it difficult to control the worry.  C. Symptoms of anxiety.   - Restlessness or feeling keyed up or on edge.    - Being easily fatigued.    - Difficulty concentrating.    - Irritability.    - Muscle tension.    - Sleep disturbance.   D. The focus of the anxiety and worry is not confined to features of another disorder.  E. The anxiety, worry, or physical symptoms cause clinically  significant distress or impairment in social, occupational, or other important areas of functioning.   F. The disturbance is not due to the direct physiological effects of a substance (e.g., a drug of abuse, a medication) or a general medical condition (e.g., hyperthyroidism) and does not occur exclusively during a Mood Disorder, a Psychotic Disorder, or a Pervasive Developmental Disorder.    Major Depressive Disorder, Single Episode, Severe With Anxious Distress:  A) Single episode - symptoms have been present during the same 2-week period and represent a change from previous functioning   - Depressed mood.     - Diminished interest or pleasure in all, or almost all, activities.    - Increase in appetite.    - Sleep disturbance.    - Psychomotor retardation.    - Fatigue and loss of energy.    - Feelings of worthlessness.    - Diminished ability to think and concentrate.    - Recurrent suicidal ideation without a specific plan.   B) The symptoms cause clinically significant distress or impairment in social, occupational, or other important areas of functioning  C) The episode is not attributable to the physiological effects of a substance or to another medical condition  D) The occurence of major depressive episode is not better explained by other thought / psychotic disorders  E) There has never been a manic episode or hypomanic episode  Specifiers of anxious distress include:  - Feeling keyed up and tense  - Feeling unusually restless  - Having difficulty concentrating      Functional Status:  The client's symptoms are causing reduced functional status in the following areas: Activities of Daily Living - difficulty completing basic household tasks  Financial management difficulty managing the small amount of money he does have  Occupational / Vocational - inability to obtain and maintain employment  Social / Relational - urges to isolate and irritability toward family and friends.      DSM5 Diagnoses: (Sustained  by DSM5 Criteria Listed Above)  Diagnoses: 296.23 (F32.2) Major Depressive Disorder, Single Episode, Severe With Anxious Distress  300.02 (F41.1) Generalized Anxiety Disorder  WHODAS 2.0 (12 item)            This questionnaire asks about difficulties due to health conditions. Health conditions  include  disease or illnesses, other health problems that may be short or long lasting,  injuries, mental health or emotional problems, and problems with alcohol or drugs.                     Think back over the past 30 days and answer these questions, thinking about how much  difficulty you had doing the following activities. For each question, please [place] only  one response.    S1 Standing for long periods such as 30 minutes? Extreme / or cannot do = 5   S2 Taking care of household responsibilities? Severe =       4   S3 Learning a new task, for example, learning how to get to a new place? Extreme / or cannot do = 5   S4 How much of a problem do you have joining community activities (for example, festivals, Buddhism or other activities) in the same way as anyone else can? Extreme / or cannot do = 5   S5 How much have you been emotionally affected by your health problems? Extreme / or cannot do = 5     In the past 30 days, how much difficulty did you have in:   S6 Concentrating on doing something for ten minutes? Extreme / or cannot do = 5   S7 Walking a long distance such as a kilometer (or equivalent)? Extreme / or cannot do = 5   S8 Washing your whole body? Moderate =   3   S9 Getting dressed? None =         1   S10 Dealing with people you do not know? Extreme / or cannot do = 5   S11 Maintaining a friendship? Severe =       4   S12 Your day to day work? Extreme / or cannot do = 5     H1 Overall, in the past 30 days, how many days were these difficulties present? Record number of days 30   H2 In the past 30 days, for how many days were you totally unable to carry out your usual activities or work because of any health  condition? Record number of days  30   H3 In the past 30 days, not counting the days that you were totally unable, for how many days did you cut back or reduce your usual activities or work because of any health condition? Record number of days 0     Attendance Agreement:  The client has signed the attendance agreement.      Collaboration:  Collaboration with other professionals is not indicated at this time.      Preliminary Treatment Plan:  The client reports no currently identified Adventism, ethnic or cultural issues relevant to therapy.     services are not indicated.    Modifications to assist communication are not indicated.    The concerns identified by the client will be addressed in therapy.    Initial Treatment will focus on: Depressed Mood - assisting the client in learning and applying skills to manage the symptoms of depression that he has been experiencing  Anxiety - assisting the client in learning and applying skills to manage the symptoms of anxiety that he has been experiencing.     As a preliminary treatment goal, client will develop more effective coping skills to manage depressive symptoms and will develop more effective coping skills to manage anxiety symptoms.    The focus of initial interventions will be to increase ability to function adaptively and increase coping skills.    Referral to another professional/service is not indicated at this time.    A release of information is not needed at this time.    Report to child / adult protection services was N/A.    The client will have access to his [facility]' medical record.    Elissa Mckeon M.S.W., L.I.C.S.W.  January 17, 2018

## 2018-01-17 ASSESSMENT — ANXIETY QUESTIONNAIRES: GAD7 TOTAL SCORE: 21

## 2018-01-23 ENCOUNTER — OFFICE VISIT (OUTPATIENT)
Dept: PSYCHOLOGY | Facility: CLINIC | Age: 28
End: 2018-01-23
Payer: COMMERCIAL

## 2018-01-23 DIAGNOSIS — F32.2 MAJOR DEPRESSIVE DISORDER, SINGLE EPISODE, SEVERE WITH ANXIOUS DISTRESS (H): ICD-10-CM

## 2018-01-23 DIAGNOSIS — F41.1 GENERALIZED ANXIETY DISORDER: Primary | ICD-10-CM

## 2018-01-23 PROCEDURE — 90834 PSYTX W PT 45 MINUTES: CPT | Performed by: SOCIAL WORKER

## 2018-01-23 NOTE — MR AVS SNAPSHOT
"                  MRN:[id number]                      After Visit Summary   2018    Marvin Alves    MRN: [id number]           Visit Information        Provider Department      2018 1:30 PM Mike Elissa Riddle, [facility] Generic      Your next 10 appointments already scheduled     2018  1:30 PM CST   Return Visit with Elissa Grace [facility] ([facility])    [address] [place] [id number]   [phone]            2018  1:30 PM CST   Return Visit with Elissa Mckeon [facility] ([facility])    [address] [place] [id number]   [phone]              MyChart Information     [employer] lets you send messages to your doctor, view your test results, renew your prescriptions, schedule appointments and more. To sign up, go to [email or web address] . Click on \"Log in\" on the left side of the screen, which will take you to the Welcome page. Then click on \"Sign up Now\" on the right side of the page.     You will be asked to enter the access code listed below, as well as some personal information. Please follow the directions to create your username and password.     Your access code is: [email or web address]  Expires: 2018  2:17 PM     Your access code will  in 90 days. If you need help or a new code, please call your [facility] clinic or [phone].        Care EveryWhere ID     This is your Care EveryWhere ID. This could be used by other organizations to access your [facility] medical records  [id number]        Equal Access to Services     OCTAVIA RUVALCABA : Steven Morales, wanubiada luqadaha, qawestta kaalmada adeegyada, corin fletcher. So [facility] [phone].    ATENCIÓN: Si habla español, tiene a regalado disposición servicios gratuitos de asistencia lingüística. Llame al [phone].    We comply " with applicable federal civil rights laws and [place] laws. We do not discriminate on the basis of race, color, national origin, age, disability, sex, sexual orientation, or gender identity.

## 2018-01-24 NOTE — PROGRESS NOTES
Progress Note    Client Name: Marvin Alves  Date: 1/23/2018         Service Type: Individual      Session Start Time: 1:40 PM  Session End Time: 2:30 PM      Session Length: 50 Minutes     Session #: 2     Attendees: Client    Treatment Plan Last Reviewed: N/A  PHQ-9 / KWASI-7 : 25/21     DATA      Progress Since Last Session (Related to Symptoms / Goals / Homework):   Symptoms: Stable    Homework: N/A      Episode of Care Goals: N/A     Current / Ongoing Stressors and Concerns:   The client reported that his primary stressor currently is his difficulty managing the symptoms of anxiety that he has been experiencing. He indicated that the symptoms of depression that he experiences are not as severe as the symptoms of anxiety.     Treatment Objective(s) Addressed in This Session:   The client completed the treatment plan during the individual therapy session.     Intervention:   N/A        ASSESSMENT: Current Emotional / Mental Status (status of significant symptoms):   Risk status (Self / Other harm or suicidal ideation)   Client denies current fears or concerns for personal safety.   Client denies current or recent suicidal ideation or behaviors.   Client denies current or recent homicidal ideation or behaviors.   Client denies current or recent self injurious behavior or ideation.   Client denies other safety concerns.   A safety and risk management plan has not been developed at this time, however client was given the after-hours number / 911 should there be a change in any of these risk factors.     Appearance:   Appropriate    Eye Contact:   Good    Psychomotor Behavior: Restless    Attitude:   Cooperative    Orientation:   All   Speech    Rate / Production: Normal     Volume:  Normal    Mood:    Anxious    Affect:    Appropriate    Thought Content:  Clear    Thought Form:  Coherent  Logical    Insight:    Fair      Medication Review:   No current psychiatric  medications prescribed.     Medication Compliance:   N/A     Changes in Health Issues:   None reported.     Chemical Use Review:   Substance Use: Chemical use reviewed. No active concerns identified.      Tobacco Use: No change in amount of tobacco use since last session.  Patient declined discussion at this time.     Collateral Reports Completed:   Not Applicable    PLAN: (Client Tasks / Therapist Tasks / Other)  N/A        SALAZAR Velasco, MILO.                                                         ________________________________________________________________________    Treatment Plan    Client's Name: Marvin Alves  YOB: 1990    Date: 1/23/2018    DSM-V Diagnoses:     296.23 (F32.2) Major Depressive Disorder, Single Episode, Severe With Anxious Distress  300.02 (F41.1) Generalized Anxiety Disorder    Psychosocial / Contextual Factors: The client is a [age] year old, , single male who resides with his mother, his father and his older brother in a home in [place], [place]. The client is currently unemployed. The client reported that he began experiencing symptoms of anxiety approximately a year and a half ago. He indicated that he began experiencing symptoms of depression shortly thereafter. The client reported the reason for seeking therapy at this time is his lack of interest or ability to function in life. He indicated that he is not employed and that he has no motivation to do anything at all.    WHODAS: 52    Referral / Collaboration:  Referral to another professional/service is not indicated at this time.    Anticipated number of session or this episode of care: 12      MeasurableTreatment Goal(s) related to diagnosis / functional impairment(s)  Goal 1: The client will learn and apply skills to manage the symptoms of anxiety that he has been experiencing.    I will know I've met my goal when I can relax.      Objective #A     The client will identify specific  "fears / thoughts that contribute to feeling anxious.  Status: New - Date: 1/23/2018     Intervention(s)  Therapist will assist the client in identifying specific fears/thoughts that contribute to feeling anxious.    Objective #B  The client will use  worry time  each day for 30 minutes of scheduled worry and then defer obsessive or anxious thinking until the next structured worry time.  Status: New - Date: 1/23/2018     Intervention(s)  Therapist will teach and support the client in learning and using \"worry time\".    Objective #C  The client will use relaxation strategies two times per day to reduce the physical symptoms of anxiety.  Status: New - Date: 1/23/2018     Intervention(s)  Therapist will teach and support the client in learning and using relaxation strategies.    Objective #D  The client will use cognitive strategies identified in therapy to challenge anxious thoughts.    Status: New - Date: 1/23/2018     Intervention(s)  Therapist will teach and support the client in using cognitive strategies to challenge anxious thoughts.    Objective #E  The client will be mindful of the body sensations that he is experiencing when he is feeling anxious.  Status: New - Date: 1/23/2018     Intervention(s)  Therapist will  and support the client in being mindful of the body sensations that he is experiencing when he is feeling anxious.      The client has reviewed and agreed to the above plan.      Elissa Mckeon, M.S.W., L.I.C.S.W.  January 24, 2018  "

## 2018-02-13 ENCOUNTER — OFFICE VISIT (OUTPATIENT)
Dept: PSYCHOLOGY | Facility: CLINIC | Age: 28
End: 2018-02-13
Payer: COMMERCIAL

## 2018-02-13 DIAGNOSIS — F41.1 GENERALIZED ANXIETY DISORDER: Primary | ICD-10-CM

## 2018-02-13 DIAGNOSIS — F32.2 MAJOR DEPRESSIVE DISORDER, SINGLE EPISODE, SEVERE WITH ANXIOUS DISTRESS (H): ICD-10-CM

## 2018-02-13 PROCEDURE — 90834 PSYTX W PT 45 MINUTES: CPT | Performed by: SOCIAL WORKER

## 2018-02-13 NOTE — MR AVS SNAPSHOT
"                  MRN:[id number]                      After Visit Summary   2018    Marvin Alves    MRN: [id number]           Visit Information        Provider Department      2018 1:30 PM Mike Elissa Riddle, [facility] Generic      Your next 10 appointments already scheduled     2018  1:30 PM CST   Return Visit with Elissa Grace [facility] ([facility])    [address] [place] [id number]   [phone]            Mar 20, 2018  2:30 PM CDT   Return Visit with Elissa Mckeon [facility] ([facility])    [address] [place] [id number]   [phone]              MyChart Information     [employer] lets you send messages to your doctor, view your test results, renew your prescriptions, schedule appointments and more. To sign up, go to [email or web address] . Click on \"Log in\" on the left side of the screen, which will take you to the Welcome page. Then click on \"Sign up Now\" on the right side of the page.     You will be asked to enter the access code listed below, as well as some personal information. Please follow the directions to create your username and password.     Your access code is: [email or web address]  Expires: 5/15/2018 10:03 AM     Your access code will  in 90 days. If you need help or a new code, please call your [facility] clinic or [phone].        Care EveryWhere ID     This is your Care EveryWhere ID. This could be used by other organizations to access your [facility] medical records  [id number]        Equal Access to Services     OCTAVIA RUVALCABA : Steven Morales, wanubiada luqadaha, qaybta kaalmada adeegyada, coirn fletcher. So [facility] [phone].    ATENCIÓN: Si habla español, tiene a regalado disposición servicios gratuitos de asistencia lingüística. Llame al [phone].    We comply " with applicable federal civil rights laws and [place] laws. We do not discriminate on the basis of race, color, national origin, age, disability, sex, sexual orientation, or gender identity.

## 2018-02-14 NOTE — PROGRESS NOTES
Progress Note    Client Name: Marvin Alves  Date: 2/13/2018         Service Type: Individual      Session Start Time: 1:40 PM  Session End Time: 2:30 PM      Session Length: 50 Minutes     Session #: 3     Attendees: Client    Treatment Plan Last Reviewed: 1/23/2018  PHQ-9 / KWASI-7 : 25/21     DATA      Progress Since Last Session (Related to Symptoms / Goals / Homework):   Symptoms: Improved    Homework: N/A      Episode of Care Goals: Minimal progress - PREPARATION (Decided to change - considering how); Intervened by negotiating a change plan and determining options / strategies for behavior change, identifying triggers, exploring social supports, and working towards setting a date to begin behavior change.     Current / Ongoing Stressors and Concerns:   The client reported that his primary stressor continues to be his difficulty managing the symptoms of anxiety that he has been experiencing.      Treatment Objective(s) Addressed in This Session:   The client will identify specific fears / thoughts that contribute to feeling anxious.     Intervention:   Discussed the client's anxiety. Assisted the client in identifying specific fears/thoughts that contribute to feeling anxious. Validated the anxiety and examined the validity of the thoughts. Obtained a commitment from the client to continue identifying the specific fears/thoughts that contribute to his anxiety.        ASSESSMENT: Current Emotional / Mental Status (status of significant symptoms):   Risk status (Self / Other harm or suicidal ideation)   Client denies current fears or concerns for personal safety.   Client denies current or recent suicidal ideation or behaviors.   Client denies current or recent homicidal ideation or behaviors.   Client denies current or recent self injurious behavior or ideation.   Client denies other safety concerns.   A safety and risk management plan has not been developed at this  time, however client was given the after-hours number / 911 should there be a change in any of these risk factors.     Appearance:   Appropriate    Eye Contact:   Good    Psychomotor Behavior: Restless    Attitude:   Cooperative    Orientation:   All   Speech    Rate / Production: Normal     Volume:  Normal    Mood:    Anxious    Affect:    Appropriate    Thought Content:  Clear    Thought Form:  Coherent  Logical    Insight:    Fair      Medication Review:   No current psychiatric medications prescribed.     Medication Compliance:   N/A     Changes in Health Issues:   None reported.     Chemical Use Review:   Substance Use: Chemical use reviewed. No active concerns identified.      Tobacco Use: No change in amount of tobacco use since last session.  Patient declined discussion at this time.     Collateral Reports Completed:   Not Applicable    PLAN: (Client Tasks / Therapist Tasks / Other)  Continue identifying the specific fears/thoughts that contribute to his anxiety.        Elissa Mckeon M.S.NEO., MILO.                                                         ________________________________________________________________________    Treatment Plan    Client's Name: Marvin Alves  YOB: 1990    Date: 1/23/2018    DSM-V Diagnoses:     296.23 (F32.2) Major Depressive Disorder, Single Episode, Severe With Anxious Distress  300.02 (F41.1) Generalized Anxiety Disorder    Psychosocial / Contextual Factors: The client is a [age] year old, , single male who resides with his mother, his father and his older brother in a home in [place], [place]. The client is currently unemployed. The client reported that he began experiencing symptoms of anxiety approximately a year and a half ago. He indicated that he began experiencing symptoms of depression shortly thereafter. The client reported the reason for seeking therapy at this time is his lack of interest or ability to function in life. He  "indicated that he is not employed and that he has no motivation to do anything at all.    WHODAS: 52    Referral / Collaboration:  Referral to another professional/service is not indicated at this time.    Anticipated number of session or this episode of care: 12      MeasurableTreatment Goal(s) related to diagnosis / functional impairment(s)  Goal 1: The client will learn and apply skills to manage the symptoms of anxiety that he has been experiencing.    I will know I've met my goal when I can relax.      Objective #A     The client will identify specific fears / thoughts that contribute to feeling anxious.  Status: New - Date: 1/23/2018     Intervention(s)  Therapist will assist the client in identifying specific fears/thoughts that contribute to feeling anxious.    Objective #B  The client will use  worry time  each day for 30 minutes of scheduled worry and then defer obsessive or anxious thinking until the next structured worry time.  Status: New - Date: 1/23/2018     Intervention(s)  Therapist will teach and support the client in learning and using \"worry time\".    Objective #C  The client will use relaxation strategies two times per day to reduce the physical symptoms of anxiety.  Status: New - Date: 1/23/2018     Intervention(s)  Therapist will teach and support the client in learning and using relaxation strategies.    Objective #D  The client will use cognitive strategies identified in therapy to challenge anxious thoughts.    Status: New - Date: 1/23/2018     Intervention(s)  Therapist will teach and support the client in using cognitive strategies to challenge anxious thoughts.    Objective #E  The client will be mindful of the body sensations that he is experiencing when he is feeling anxious.  Status: New - Date: 1/23/2018     Intervention(s)  Therapist will  and support the client in being mindful of the body sensations that he is experiencing when he is feeling anxious.      The client has " reviewed and agreed to the above plan.      MERCEDES Velasco.S.NEO., L.I.ANDRE.S.W.  January 24, 2018

## 2018-02-23 ENCOUNTER — OFFICE VISIT (OUTPATIENT)
Dept: PSYCHOLOGY | Facility: CLINIC | Age: 28
End: 2018-02-23
Payer: COMMERCIAL

## 2018-02-23 DIAGNOSIS — F32.2 MAJOR DEPRESSIVE DISORDER, SINGLE EPISODE, SEVERE WITH ANXIOUS DISTRESS (H): ICD-10-CM

## 2018-02-23 DIAGNOSIS — F41.1 GENERALIZED ANXIETY DISORDER: Primary | ICD-10-CM

## 2018-02-23 PROCEDURE — 90834 PSYTX W PT 45 MINUTES: CPT | Performed by: SOCIAL WORKER

## 2018-02-23 NOTE — MR AVS SNAPSHOT
"                  MRN:[id number]                      After Visit Summary   2018    Marvin Alves    MRN: [id number]           Visit Information        Provider Department      2018 1:30 PM Mike Elissa LONG Venkatesh, [facility] Generic      Your next 10 appointments already scheduled     Mar 06, 2018  8:30 AM CST   Return Visit with Elissa Grace [facility] ([facility])    [address] [place] [id number]   [phone]            Mar 20, 2018  2:30 PM CDT   Return Visit with Elissa Mckeon [facility] ([facility])    [address] [place] [id number]   [phone]              MyChart Information     [employer] lets you send messages to your doctor, view your test results, renew your prescriptions, schedule appointments and more. To sign up, go to [email or web address] . Click on \"Log in\" on the left side of the screen, which will take you to the Welcome page. Then click on \"Sign up Now\" on the right side of the page.     You will be asked to enter the access code listed below, as well as some personal information. Please follow the directions to create your username and password.     Your access code is: [email or web address]  Expires: 5/15/2018 10:03 AM     Your access code will  in 90 days. If you need help or a new code, please call your [facility] clinic or [phone].        Care EveryWhere ID     This is your Care EveryWhere ID. This could be used by other organizations to access your [facility] medical records  [id number]        Equal Access to Services     OCTAVIA RUVALCABA : Steven Morales, wanubiada lumichele, qahoward kaalmada adejaniyada, corin fletcher. So [facility] [phone].    ATENCIÓN: Si habla español, tiene a regalado disposición servicios gratuitos de asistencia lingüística. Llame al [phone].    We comply " with applicable federal civil rights laws and [place] laws. We do not discriminate on the basis of race, color, national origin, age, disability, sex, sexual orientation, or gender identity.

## 2018-02-24 NOTE — PROGRESS NOTES
"                                             Progress Note    Client Name: Marvin Alves  Date: 2/23/2018         Service Type: Individual      Session Start Time: 1:30 PM  Session End Time: 2:20 PM      Session Length: 50 Minutes     Session #: 4     Attendees: Client    Treatment Plan Last Reviewed: 1/23/2018  PHQ-9 / KWASI-7 : 25/21     DATA      Progress Since Last Session (Related to Symptoms / Goals / Homework):   Symptoms: Stable    Homework: Achieved / completed to satisfaction. The client stated that he has been attempting to identify the specific fears/thoughts that contribute to his anxiety. He indicated that he has been struggling to identify the thoughts.      Episode of Care Goals: Minimal progress - ACTION (Actively working towards change); Intervened by reinforcing change plan / affirming steps taken     Current / Ongoing Stressors and Concerns:   The client reported that his primary stressor continues to be his difficulty managing the symptoms of anxiety that he has been experiencing.      Treatment Objective(s) Addressed in This Session:   The client will use relaxation strategies two times per day to reduce the physical symptoms of anxiety.     Intervention:   Examined the client's anxiety. Taught and supported the client in learning and using the \"calm place\" exercise in vivo. Obtained a commitment from the client to use the \"calm place\" exercise two times each day.        ASSESSMENT: Current Emotional / Mental Status (status of significant symptoms):   Risk status (Self / Other harm or suicidal ideation)   Client denies current fears or concerns for personal safety.   Client denies current or recent suicidal ideation or behaviors.   Client denies current or recent homicidal ideation or behaviors.   Client denies current or recent self injurious behavior or ideation.   Client denies other safety concerns.   A safety and risk management plan has not been developed at this time, however client was " "given the after-hours number / 911 should there be a change in any of these risk factors.     Appearance:   Appropriate    Eye Contact:   Good    Psychomotor Behavior: Restless    Attitude:   Cooperative    Orientation:   All   Speech    Rate / Production: Normal     Volume:  Normal    Mood:    Anxious    Affect:    Appropriate    Thought Content:  Clear    Thought Form:  Coherent  Logical    Insight:    Fair      Medication Review:   No current psychiatric medications prescribed.     Medication Compliance:   N/A     Changes in Health Issues:   None reported.     Chemical Use Review:   Substance Use: Chemical use reviewed. No active concerns identified.      Tobacco Use: No change in amount of tobacco use since last session.  Patient declined discussion at this time.     Collateral Reports Completed:   Not Applicable    PLAN: (Client Tasks / Therapist Tasks / Other)  Use the \"calm place\" exercise two times each day.        Elissa Mckeon, M.S.NEO., MILO.                                                         ________________________________________________________________________    Treatment Plan    Client's Name: Marvin Alves  YOB: 1990    Date: 1/23/2018    DSM-V Diagnoses:     296.23 (F32.2) Major Depressive Disorder, Single Episode, Severe With Anxious Distress  300.02 (F41.1) Generalized Anxiety Disorder    Psychosocial / Contextual Factors: The client is a [age] year old, , single male who resides with his mother, his father and his older brother in a home in [place], [place]. The client is currently unemployed. The client reported that he began experiencing symptoms of anxiety approximately a year and a half ago. He indicated that he began experiencing symptoms of depression shortly thereafter. The client reported the reason for seeking therapy at this time is his lack of interest or ability to function in life. He indicated that he is not employed and that he has no " "motivation to do anything at all.    WHODAS: 52    Referral / Collaboration:  Referral to another professional/service is not indicated at this time.    Anticipated number of session or this episode of care: 12      MeasurableTreatment Goal(s) related to diagnosis / functional impairment(s)  Goal 1: The client will learn and apply skills to manage the symptoms of anxiety that he has been experiencing.    I will know I've met my goal when I can relax.      Objective #A     The client will identify specific fears / thoughts that contribute to feeling anxious.  Status: New - Date: 1/23/2018     Intervention(s)  Therapist will assist the client in identifying specific fears/thoughts that contribute to feeling anxious.    Objective #B  The client will use  worry time  each day for 30 minutes of scheduled worry and then defer obsessive or anxious thinking until the next structured worry time.  Status: New - Date: 1/23/2018     Intervention(s)  Therapist will teach and support the client in learning and using \"worry time\".    Objective #C  The client will use relaxation strategies two times per day to reduce the physical symptoms of anxiety.  Status: New - Date: 1/23/2018     Intervention(s)  Therapist will teach and support the client in learning and using relaxation strategies.    Objective #D  The client will use cognitive strategies identified in therapy to challenge anxious thoughts.    Status: New - Date: 1/23/2018     Intervention(s)  Therapist will teach and support the client in using cognitive strategies to challenge anxious thoughts.    Objective #E  The client will be mindful of the body sensations that he is experiencing when he is feeling anxious.  Status: New - Date: 1/23/2018     Intervention(s)  Therapist will  and support the client in being mindful of the body sensations that he is experiencing when he is feeling anxious.      The client has reviewed and agreed to the above plan.      Eilssa BEST " SUKH Mckeon., L.I.C.S.W.  January 24, 2018

## 2018-03-08 ENCOUNTER — OFFICE VISIT (OUTPATIENT)
Dept: PSYCHOLOGY | Facility: CLINIC | Age: 28
End: 2018-03-08
Payer: COMMERCIAL

## 2018-03-08 DIAGNOSIS — F41.1 GENERALIZED ANXIETY DISORDER: Primary | ICD-10-CM

## 2018-03-08 DIAGNOSIS — F32.2 MAJOR DEPRESSIVE DISORDER, SINGLE EPISODE, SEVERE WITH ANXIOUS DISTRESS (H): ICD-10-CM

## 2018-03-08 PROCEDURE — 90834 PSYTX W PT 45 MINUTES: CPT | Performed by: SOCIAL WORKER

## 2018-03-08 NOTE — PROGRESS NOTES
"                                             Progress Note    Client Name: Marvin Alves  Date: 3/8/2018         Service Type: Individual      Session Start Time: 9:35 AM  Session End Time: 10:25 AM      Session Length: 50 Minutes     Session #: 5     Attendees: Client    Treatment Plan Last Reviewed: 1/23/2018  PHQ-9 / KWASI-7 : 25/21     DATA      Progress Since Last Session (Related to Symptoms / Goals / Homework):   Symptoms: Improved    Homework: Partially completed. The client stated that he used the \"calm place\" imagery once over the past week. He indicated that he noticed a reduction in his anxiety as a result of using the imagery.      Episode of Care Goals: Satisfactory progress - ACTION (Actively working towards change); Intervened by reinforcing change plan / affirming steps taken.     Current / Ongoing Stressors and Concerns:   The client reported that his primary stressor continues to be his difficulty managing the symptoms of anxiety that he has been experiencing.      Treatment Objective(s) Addressed in This Session:   The client will be mindful of the body sensations that he is experiencing when he is feeling anxious.     Intervention:   Discussed and reinforced the client's efforts to use the \"calm place\" imagery. Obtained a commitment from the client to use the \"calm place\" imagery daily. Examined the client's anxiety. Coached and supported the client in being mindful of the body sensations that he is experiencing when he is feeling anxious. Obtained a commitment from the client to attempt to be mindful of the body sensations that he is experiencing when he is feeling anxious.         ASSESSMENT: Current Emotional / Mental Status (status of significant symptoms):   Risk status (Self / Other harm or suicidal ideation)   Client denies current fears or concerns for personal safety.   Client denies current or recent suicidal ideation or behaviors.   Client denies current or recent homicidal ideation or " "behaviors.   Client denies current or recent self injurious behavior or ideation.   Client denies other safety concerns.   A safety and risk management plan has not been developed at this time, however client was given the after-hours number / 911 should there be a change in any of these risk factors.     Appearance:   Appropriate    Eye Contact:   Good    Psychomotor Behavior: Restless    Attitude:   Cooperative    Orientation:   All   Speech    Rate / Production: Normal     Volume:  Normal    Mood:    Anxious    Affect:    Appropriate    Thought Content:  Clear    Thought Form:  Coherent  Logical    Insight:    Fair      Medication Review:   No current psychiatric medications prescribed.     Medication Compliance:   N/A     Changes in Health Issues:   None reported.     Chemical Use Review:   Substance Use: Chemical use reviewed. No active concerns identified.      Tobacco Use: No change in amount of tobacco use since last session.  Patient declined discussion at this time.     Collateral Reports Completed:   Not Applicable    PLAN: (Client Tasks / Therapist Tasks / Other)  Use the \"calm place\" exercise daily. Attempt to be mindful of the body sensations that he is experiencing when he is feeling anxious.           Elissa Mckeon, M.S.W., L.KASSANDRA.C.S.W.                                                         ________________________________________________________________________    Treatment Plan    Client's Name: Marvin Alves  YOB: 1990    Date: 1/23/2018    DSM-V Diagnoses:     296.23 (F32.2) Major Depressive Disorder, Single Episode, Severe With Anxious Distress  300.02 (F41.1) Generalized Anxiety Disorder    Psychosocial / Contextual Factors: The client is a [age] year old, , single male who resides with his mother, his father and his older brother in a home in [place], [place]. The client is currently unemployed. The client reported that he began experiencing symptoms of anxiety " "approximately a year and a half ago. He indicated that he began experiencing symptoms of depression shortly thereafter. The client reported the reason for seeking therapy at this time is his lack of interest or ability to function in life. He indicated that he is not employed and that he has no motivation to do anything at all.    WHODAS: 52    Referral / Collaboration:  Referral to another professional/service is not indicated at this time.    Anticipated number of session or this episode of care: 12      MeasurableTreatment Goal(s) related to diagnosis / functional impairment(s)  Goal 1: The client will learn and apply skills to manage the symptoms of anxiety that he has been experiencing.    I will know I've met my goal when I can relax.      Objective #A     The client will identify specific fears / thoughts that contribute to feeling anxious.  Status: New - Date: 1/23/2018     Intervention(s)  Therapist will assist the client in identifying specific fears/thoughts that contribute to feeling anxious.    Objective #B  The client will use  worry time  each day for 30 minutes of scheduled worry and then defer obsessive or anxious thinking until the next structured worry time.  Status: New - Date: 1/23/2018     Intervention(s)  Therapist will teach and support the client in learning and using \"worry time\".    Objective #C  The client will use relaxation strategies two times per day to reduce the physical symptoms of anxiety.  Status: New - Date: 1/23/2018     Intervention(s)  Therapist will teach and support the client in learning and using relaxation strategies.    Objective #D  The client will use cognitive strategies identified in therapy to challenge anxious thoughts.    Status: New - Date: 1/23/2018     Intervention(s)  Therapist will teach and support the client in using cognitive strategies to challenge anxious thoughts.    Objective #E  The client will be mindful of the body sensations that he is " experiencing when he is feeling anxious.  Status: New - Date: 1/23/2018     Intervention(s)  Therapist will  and support the client in being mindful of the body sensations that he is experiencing when he is feeling anxious.      The client has reviewed and agreed to the above plan.      Elissa Mckeon M.S.NEO., L.KASSANDRA.ANDRE.S.W.  January 24, 2018

## 2018-03-08 NOTE — MR AVS SNAPSHOT
"                  MRN:[id number]                      After Visit Summary   3/8/2018    Marvin Alves    MRN: [id number]           Visit Information        Provider Department      3/8/2018 9:30 AM Mike Elissa LONG Venkatesh, [facility] Generic      Your next 10 appointments already scheduled     Mar 20, 2018  2:30 PM CDT   Return Visit with Elissa Grace [facility] ([facility])    [address] [place] [address]   [phone]            2018  1:30 PM CDT   Return Visit with Elissa Mckeon [facility] ([facility])    [address] [place] [address]   [phone]              MyChart Information     [employer]Stre[employer] lets you send messages to your doctor, view your test results, renew your prescriptions, schedule appointments and more. To sign up, go to [email or web address] . Click on \"Log in\" on the left side of the screen, which will take you to the Welcome page. Then click on \"Sign up Now\" on the right side of the page.     You will be asked to enter the access code listed below, as well as some personal information. Please follow the directions to create your username and password.     Your access code is: [email or web address]  Expires: 5/15/2018 10:03 AM     Your access code will  in 90 days. If you need help or a new code, please call your [place] clinic or [phone].        Care EveryWhere ID     This is your Care EveryWhere ID. This could be used by other organizations to access your [place] medical records  [id number]        Equal Access to Services     OCTAVIA RUVALCABA : Steven Morales, wanubiada luqadaha, qaybta kaalmada adeegyada, corin fletcher. So [facility] [phone].    ATENCIÓN: Si habla español, tiene a regalado disposición servicios gratuitos de asistencia lingüística. Llame al [phone].    We comply " with applicable federal civil rights laws and [place] laws. We do not discriminate on the basis of race, color, national origin, age, disability, sex, sexual orientation, or gender identity.

## 2018-04-03 ENCOUNTER — OFFICE VISIT (OUTPATIENT)
Dept: PSYCHOLOGY | Facility: CLINIC | Age: 28
End: 2018-04-03
Payer: COMMERCIAL

## 2018-04-03 DIAGNOSIS — F41.1 GENERALIZED ANXIETY DISORDER: Primary | ICD-10-CM

## 2018-04-03 DIAGNOSIS — F32.2 MAJOR DEPRESSIVE DISORDER, SINGLE EPISODE, SEVERE WITH ANXIOUS DISTRESS (H): ICD-10-CM

## 2018-04-03 PROCEDURE — 90834 PSYTX W PT 45 MINUTES: CPT | Performed by: SOCIAL WORKER

## 2018-04-03 NOTE — MR AVS SNAPSHOT
"                  MRN:[id number]                      After Visit Summary   4/3/2018    Marvin Alves    MRN: [id number]           Visit Information        Provider Department      4/3/2018 1:30 PM Mike Elissa LONG Venkatesh, [facility] Generic      Your next 10 appointments already scheduled     May 02, 2018 10:30 AM CDT   Return Visit with Elissa Grace [facility] ([facility])    [address] [place] [address]   [phone]            May 09, 2018 10:30 AM CDT   Return Visit with Elissa Mckeon [facility] ([facility])    [address] [place] [address]   [phone]              MyChart Information     [employer]Me[employer] lets you send messages to your doctor, view your test results, renew your prescriptions, schedule appointments and more. To sign up, go to [email or web address] . Click on \"Log in\" on the left side of the screen, which will take you to the Welcome page. Then click on \"Sign up Now\" on the right side of the page.     You will be asked to enter the access code listed below, as well as some personal information. Please follow the directions to create your username and password.     Your access code is: [email or web address]  Expires: 5/15/2018 11:03 AM     Your access code will  in 90 days. If you need help or a new code, please call your [place] clinic or [phone].        Care EveryWhere ID     This is your Care EveryWhere ID. This could be used by other organizations to access your [place] medical records  [id number]        Equal Access to Services     OCTAVIA RUVALCABA : Steven Morales, wanubiada luqadaha, qaybta kaalmada adeegyada, corin fletcher. So [facility] [phone].    ATENCIÓN: Si habla español, tiene a regalado disposición servicios gratuitos de asistencia lingüística. Llame al [phone].    We comply " with applicable federal civil rights laws and [place] laws. We do not discriminate on the basis of race, color, national origin, age, disability, sex, sexual orientation, or gender identity.

## 2018-04-04 NOTE — PROGRESS NOTES
"                                             Progress Note    Client Name: Marvin Alves  Date: 4/3/2018         Service Type: Individual      Session Start Time: 1:40 PM  Session End Time: 2:30 PM      Session Length: 50 Minutes     Session #: 6     Attendees: Client    Treatment Plan Last Reviewed: 1/23/2018  PHQ-9 / KWASI-7 : 25/21     DATA      Progress Since Last Session (Related to Symptoms / Goals / Homework):   Symptoms: Improved    Homework: Partially completed. The client stated that he did not use the \"calm place\" imagery over the past few weeks. He indicated that, on one occasion, he did attempt to be mindful of the body sensations that he has been experiencing when he felt anxious. He stated that he was unable to maintain his attention on the body sensations. He reported that he began having judgments about the body sensations.      Episode of Care Goals: Minimal progress - ACTION (Actively working towards change); Intervened by reinforcing change plan / affirming steps taken     Current / Ongoing Stressors and Concerns:   The client reported that his primary stressor continues to be his difficulty managing the symptoms of anxiety that he has been experiencing.      Treatment Objective(s) Addressed in This Session:   The client will be mindful of the body sensations that he is experiencing when he is feeling anxious.     Intervention:   Examined the client's anxiety. Discussed his effort to be mindful of the body sensations that he was experiencing when he was feeling anxious. Coached and supported the client in being mindful of the body sensations that he was experiencing. Obtained a commitment from the client to continue to attempt to be mindful of the body sensations that he is experiencing when he is feeling anxious.         ASSESSMENT: Current Emotional / Mental Status (status of significant symptoms):   Risk status (Self / Other harm or suicidal ideation)   Client denies current fears or concerns " for personal safety.   Client denies current or recent suicidal ideation or behaviors.   Client denies current or recent homicidal ideation or behaviors.   Client denies current or recent self injurious behavior or ideation.   Client denies other safety concerns.   A safety and risk management plan has not been developed at this time, however client was given the after-hours number / 911 should there be a change in any of these risk factors.     Appearance:   Appropriate    Eye Contact:   Good    Psychomotor Behavior: Restless    Attitude:   Cooperative    Orientation:   All   Speech    Rate / Production: Normal     Volume:  Normal    Mood:    Anxious    Affect:    Appropriate    Thought Content:  Clear    Thought Form:  Coherent  Logical    Insight:    Fair      Medication Review:   No current psychiatric medications prescribed.     Medication Compliance:   N/A     Changes in Health Issues:   None reported.     Chemical Use Review:   Substance Use: Chemical use reviewed. No active concerns identified.      Tobacco Use: No change in amount of tobacco use since last session.  Patient declined discussion at this time.     Collateral Reports Completed:   Not Applicable    PLAN: (Client Tasks / Therapist Tasks / Other)  Continue to attempt to be mindful of the body sensations that he is experiencing when he is feeling anxious.           Elissa Mckeon, M.S.W., L.KASSANDRA.C.S.W.                                                         ________________________________________________________________________    Treatment Plan    Client's Name: Marvin Alves  YOB: 1990    Date: 1/23/2018    DSM-V Diagnoses:     296.23 (F32.2) Major Depressive Disorder, Single Episode, Severe With Anxious Distress  300.02 (F41.1) Generalized Anxiety Disorder    Psychosocial / Contextual Factors: The client is a [age] year old, , single male who resides with his mother, his father and his older brother in a home in  "[place], [place]. The client is currently unemployed. The client reported that he began experiencing symptoms of anxiety approximately a year and a half ago. He indicated that he began experiencing symptoms of depression shortly thereafter. The client reported the reason for seeking therapy at this time is his lack of interest or ability to function in life. He indicated that he is not employed and that he has no motivation to do anything at all.    WHODAS: 52    Referral / Collaboration:  Referral to another professional/service is not indicated at this time.    Anticipated number of session or this episode of care: 12      MeasurableTreatment Goal(s) related to diagnosis / functional impairment(s)  Goal 1: The client will learn and apply skills to manage the symptoms of anxiety that he has been experiencing.    I will know I've met my goal when I can relax.      Objective #A     The client will identify specific fears / thoughts that contribute to feeling anxious.  Status: New - Date: 1/23/2018     Intervention(s)  Therapist will assist the client in identifying specific fears/thoughts that contribute to feeling anxious.    Objective #B  The client will use  worry time  each day for 30 minutes of scheduled worry and then defer obsessive or anxious thinking until the next structured worry time.  Status: New - Date: 1/23/2018     Intervention(s)  Therapist will teach and support the client in learning and using \"worry time\".    Objective #C  The client will use relaxation strategies two times per day to reduce the physical symptoms of anxiety.  Status: New - Date: 1/23/2018     Intervention(s)  Therapist will teach and support the client in learning and using relaxation strategies.    Objective #D  The client will use cognitive strategies identified in therapy to challenge anxious thoughts.    Status: New - Date: 1/23/2018     Intervention(s)  Therapist will teach and support the client in using cognitive strategies " to challenge anxious thoughts.    Objective #E  The client will be mindful of the body sensations that he is experiencing when he is feeling anxious.  Status: New - Date: 1/23/2018     Intervention(s)  Therapist will  and support the client in being mindful of the body sensations that he is experiencing when he is feeling anxious.      The client has reviewed and agreed to the above plan.      MERCEDES Velasco.S.NEO., L.I.C.S.W.  January 24, 2018

## 2018-05-02 ENCOUNTER — OFFICE VISIT (OUTPATIENT)
Dept: PSYCHOLOGY | Facility: CLINIC | Age: 28
End: 2018-05-02
Payer: COMMERCIAL

## 2018-05-02 DIAGNOSIS — F41.1 GENERALIZED ANXIETY DISORDER: Primary | ICD-10-CM

## 2018-05-02 DIAGNOSIS — F32.2 MAJOR DEPRESSIVE DISORDER, SINGLE EPISODE, SEVERE WITH ANXIOUS DISTRESS (H): ICD-10-CM

## 2018-05-02 PROCEDURE — 90834 PSYTX W PT 45 MINUTES: CPT | Performed by: SOCIAL WORKER

## 2018-05-02 NOTE — MR AVS SNAPSHOT
"                  MRN:[id number]                      After Visit Summary   2018    Marvin Alves    MRN: [id number]           Visit Information        Provider Department      2018 10:30 AM Elissa Mckeon, [facility] Generic      Your next 10 appointments already scheduled     May 09, 2018 10:30 AM CDT   Return Visit with Elissa LONG Venkatesh Mckeon [facility] ([facility])    [address] [place] [address]   [phone]            May 29, 2018  8:30 AM CDT   Return Visit with Elissa Riddle Mike [facility] ([facility])    [address] [place] [address]   [phone]            2018  2:30 PM CDT   Return Visit with Elissa LONG Venkatesh Mckeon [facility] ([facility])    [address] [place] [address]   [phone]              MyChart Information     [employer] lets you send messages to your doctor, view your test results, renew your prescriptions, schedule appointments and more. To sign up, go to [email or web address] . Click on \"Log in\" on the left side of the screen, which will take you to the Welcome page. Then click on \"Sign up Now\" on the right side of the page.     You will be asked to enter the access code listed below, as well as some personal information. Please follow the directions to create your username and password.     Your access code is: [email or web address]  Expires: 5/15/2018 11:03 AM     Your access code will  in 90 days. If you need help or a new code, please call your [place] clinic or [phone].        Care EveryWhere ID     This is your Care EveryWhere ID. This could be used by other organizations to access your [place] medical records  [id number]        Equal Access to Services     OCTAVIA RUVALCABA AH: Steven Morales, jean claude mckinley, qahoward henriquez " corin knox ah. So [facility] [phone].    ATENCIÓN: Si habla español, tiene a regalado disposición servicios gratuitos de asistencia lingüística. Llame al [phone].    We comply with applicable federal civil rights laws and [place] laws. We do not discriminate on the basis of race, color, national origin, age, disability, sex, sexual orientation, or gender identity.

## 2018-05-03 NOTE — PROGRESS NOTES
Progress Note    Client Name: Marvin Alves  Date: 5/2/2018         Service Type: Individual      Session Start Time: 10:35 AM  Session End Time: 11:25 AM      Session Length: 50 Minutes     Session #: 7     Attendees: Client    Treatment Plan Last Reviewed: 5/2/2018  PHQ-9 / KWASI-7 : 25/21     DATA      Progress Since Last Session (Related to Symptoms / Goals / Homework):   Symptoms: Stable    Homework: Partially completed. The client stated that he has had some success in being mindful of the body sensations that he has been experiencing when he felt anxious. He stated that he has been better able to maintain his attention on the body sensations.       Episode of Care Goals: Satisfactory progress - ACTION (Actively working towards change); Intervened by reinforcing change plan / affirming steps taken.     Current / Ongoing Stressors and Concerns:   The client reported that his primary stressor continues to be his difficulty managing the symptoms of anxiety that he has been experiencing.      Treatment Objective(s) Addressed in This Session:   The client will participate in EMDR treatment to reprocess traumatic memories. The client will participate in the review and revision of the individual treatment plan.     Intervention:   EMDR: Presented didactic information about EMDR treatment. Identified how the treatment may be applied to the client's symptoms. Created a plan to begin EMDR treatment. Reviewed and revised the individual treatment plan.        ASSESSMENT: Current Emotional / Mental Status (status of significant symptoms):   Risk status (Self / Other harm or suicidal ideation)   Client denies current fears or concerns for personal safety.   Client denies current or recent suicidal ideation or behaviors.   Client denies current or recent homicidal ideation or behaviors.   Client denies current or recent self injurious behavior or ideation.   Client denies other  safety concerns.   A safety and risk management plan has not been developed at this time, however client was given the after-hours number / 911 should there be a change in any of these risk factors.     Appearance:   Appropriate    Eye Contact:   Good    Psychomotor Behavior: Restless    Attitude:   Cooperative    Orientation:   All   Speech    Rate / Production: Normal     Volume:  Normal    Mood:    Anxious    Affect:    Appropriate    Thought Content:  Clear    Thought Form:  Coherent  Logical    Insight:    Fair      Medication Review:   No current psychiatric medications prescribed.     Medication Compliance:   N/A     Changes in Health Issues:   None reported.     Chemical Use Review:   Substance Use: Chemical use reviewed. No active concerns identified.      Tobacco Use: No change in amount of tobacco use since last session.  Patient declined discussion at this time.     Collateral Reports Completed:   Not Applicable    PLAN: (Client Tasks / Therapist Tasks / Other)  N/A         NASIR VelascoSAMOS, MILO.                                                         ________________________________________________________________________    Treatment Plan    Client's Name: Marvin Alves  YOB: 1990    Date: 5/2/2018    DSM-V Diagnoses:     296.23 (F32.2) Major Depressive Disorder, Single Episode, Severe With Anxious Distress  300.02 (F41.1) Generalized Anxiety Disorder    Psychosocial / Contextual Factors: The client is a [age] year old, , single male who resides with his mother, his father and his older brother in a home in [place], [place]. The client is currently unemployed. The client reported that he began experiencing symptoms of anxiety approximately a year and a half ago. He indicated that he began experiencing symptoms of depression shortly thereafter. The client reported the reason for seeking therapy at this time is his lack of interest or ability to function in  "life. He indicated that he is not employed and that he has no motivation to do anything at all.    WHODAS: 52    Referral / Collaboration:  Referral to another professional/service is not indicated at this time.    Anticipated number of session or this episode of care: 12      MeasurableTreatment Goal(s) related to diagnosis / functional impairment(s)  Goal 1: The client will learn and apply skills to manage the symptoms of anxiety that he has been experiencing.    I will know I've met my goal when I can relax.      Objective #A     The client will identify specific fears / thoughts that contribute to feeling anxious.  Status: Continued - Date(s): 5/2/2018     Intervention(s)  Therapist will assist the client in identifying specific fears/thoughts that contribute to feeling anxious.    Objective #B  The client will use  worry time  each day for 30 minutes of scheduled worry and then defer obsessive or anxious thinking until the next structured worry time.  Status: Continued - Date(s): 5/2/2018     Intervention(s)  Therapist will teach and support the client in learning and using \"worry time\".    Objective #C  The client will use relaxation strategies two times per day to reduce the physical symptoms of anxiety.  Status: Continued - Date(s): 5/2/2018     Intervention(s)  Therapist will teach and support the client in learning and using relaxation strategies.    Objective #D  The client will use cognitive strategies identified in therapy to challenge anxious thoughts.    Status: Continued - Date(s): 5/2/2018     Intervention(s)  Therapist will teach and support the client in using cognitive strategies to challenge anxious thoughts.    Objective #E  The client will be mindful of the body sensations that he is experiencing when he is feeling anxious.  Status: Continued - Date(s): 5/2/2018     Intervention(s)  Therapist will  and support the client in being mindful of the body sensations that he is experiencing when " he is feeling anxious.    Objective #F  The client will participate in EMDR treatment to reprocess traumatic memories.  Status: New - Date: 5/2/2018     Intervention(s)  Therapist will utilize EMDR treatment to facilitate the client's reprocessing of traumatic memories.      The client has reviewed and agreed to the above plan.      MERCEDES Velasco.S.NEO., L.I.C.S.W.  May 2, 2018

## 2018-05-09 ENCOUNTER — OFFICE VISIT (OUTPATIENT)
Dept: PSYCHOLOGY | Facility: CLINIC | Age: 28
End: 2018-05-09
Payer: COMMERCIAL

## 2018-05-09 DIAGNOSIS — F41.1 GENERALIZED ANXIETY DISORDER: Primary | ICD-10-CM

## 2018-05-09 DIAGNOSIS — F32.2 MAJOR DEPRESSIVE DISORDER, SINGLE EPISODE, SEVERE WITH ANXIOUS DISTRESS (H): ICD-10-CM

## 2018-05-09 PROCEDURE — 90834 PSYTX W PT 45 MINUTES: CPT | Performed by: SOCIAL WORKER

## 2018-05-09 NOTE — PROGRESS NOTES
Progress Note    Client Name: Marvin Alves  Date: 5/9/2018         Service Type: Individual      Session Start Time: 10:35 AM  Session End Time: 11:25 AM      Session Length: 50 Minutes     Session #: 8     Attendees: Client    Treatment Plan Last Reviewed: 5/2/2018  PHQ-9 / KWASI-7 : 25/21     DATA      Progress Since Last Session (Related to Symptoms / Goals / Homework):   Symptoms: Stable    Homework: N/A.        Episode of Care Goals: Satisfactory progress - ACTION (Actively working towards change); Intervened by reinforcing change plan / affirming steps taken.     Current / Ongoing Stressors and Concerns:   The client reported that his primary stressor continues to be his difficulty managing the symptoms of anxiety that he has been experiencing.      Treatment Objective(s) Addressed in This Session:   The client will participate in EMDR treatment to reprocess traumatic memories.     Intervention:   EMDR: Oriented the client to EMDR treatment. Completed phase one of EMDR treatment.        ASSESSMENT: Current Emotional / Mental Status (status of significant symptoms):   Risk status (Self / Other harm or suicidal ideation)   Client denies current fears or concerns for personal safety.   Client denies current or recent suicidal ideation or behaviors.   Client denies current or recent homicidal ideation or behaviors.   Client denies current or recent self injurious behavior or ideation.   Client denies other safety concerns.   A safety and risk management plan has not been developed at this time, however client was given the after-hours number / 911 should there be a change in any of these risk factors.     Appearance:   Appropriate    Eye Contact:   Good    Psychomotor Behavior: Restless    Attitude:   Cooperative    Orientation:   All   Speech    Rate / Production: Normal     Volume:  Normal    Mood:    Anxious    Affect:    Appropriate    Thought Content:  Clear     Thought Form:  Coherent  Logical    Insight:    Fair      Medication Review:   No current psychiatric medications prescribed.     Medication Compliance:   N/A     Changes in Health Issues:   None reported.     Chemical Use Review:   Substance Use: Chemical use reviewed. No active concerns identified.      Tobacco Use: No change in amount of tobacco use since last session.  Patient declined discussion at this time.     Collateral Reports Completed:   Not Applicable    PLAN: (Client Tasks / Therapist Tasks / Other)  N/A         SALAZAR Velasco, MILO.                                                         ________________________________________________________________________    Treatment Plan    Client's Name: Marvin Alves  YOB: 1990    Date: 5/2/2018    DSM-V Diagnoses:     296.23 (F32.2) Major Depressive Disorder, Single Episode, Severe With Anxious Distress  300.02 (F41.1) Generalized Anxiety Disorder    Psychosocial / Contextual Factors: The client is a [age] year old, , single male who resides with his mother, his father and his older brother in a home in [place], [place]. The client is currently unemployed. The client reported that he began experiencing symptoms of anxiety approximately a year and a half ago. He indicated that he began experiencing symptoms of depression shortly thereafter. The client reported the reason for seeking therapy at this time is his lack of interest or ability to function in life. He indicated that he is not employed and that he has no motivation to do anything at all.    WHODAS: 52    Referral / Collaboration:  Referral to another professional/service is not indicated at this time.    Anticipated number of session or this episode of care: 12      MeasurableTreatment Goal(s) related to diagnosis / functional impairment(s)  Goal 1: The client will learn and apply skills to manage the symptoms of anxiety that he has been experiencing.    " I will know I've met my goal when I can relax.      Objective #A     The client will identify specific fears / thoughts that contribute to feeling anxious.  Status: Continued - Date(s): 5/2/2018     Intervention(s)  Therapist will assist the client in identifying specific fears/thoughts that contribute to feeling anxious.    Objective #B  The client will use  worry time  each day for 30 minutes of scheduled worry and then defer obsessive or anxious thinking until the next structured worry time.  Status: Continued - Date(s): 5/2/2018     Intervention(s)  Therapist will teach and support the client in learning and using \"worry time\".    Objective #C  The client will use relaxation strategies two times per day to reduce the physical symptoms of anxiety.  Status: Continued - Date(s): 5/2/2018     Intervention(s)  Therapist will teach and support the client in learning and using relaxation strategies.    Objective #D  The client will use cognitive strategies identified in therapy to challenge anxious thoughts.    Status: Continued - Date(s): 5/2/2018     Intervention(s)  Therapist will teach and support the client in using cognitive strategies to challenge anxious thoughts.    Objective #E  The client will be mindful of the body sensations that he is experiencing when he is feeling anxious.  Status: Continued - Date(s): 5/2/2018     Intervention(s)  Therapist will  and support the client in being mindful of the body sensations that he is experiencing when he is feeling anxious.    Objective #F  The client will participate in EMDR treatment to reprocess traumatic memories.  Status: New - Date: 5/2/2018     Intervention(s)  Therapist will utilize EMDR treatment to facilitate the client's reprocessing of traumatic memories.      The client has reviewed and agreed to the above plan.      Elissa Mckeon, M.S.NEO., L.I.C.S.W.  May 2, 2018  "

## 2018-05-09 NOTE — MR AVS SNAPSHOT
"                  MRN:[id number]                      After Visit Summary   2018    Marvin Alves    MRN: [id number]           Visit Information        Provider Department      2018 10:30 AM Elissa Mckeon, [facility] Generic      Your next 10 appointments already scheduled     May 29, 2018  8:30 AM CDT   Return Visit with Elissa LONG Venkatesh Mckeon [facility] ([facility])    [address] [place] [address]   [phone]            2018  2:30 PM CDT   Return Visit with Elissa Riddle Mike [facility] ([facility])    [address] [place] [id number]   [phone]            2018  1:30 PM CDT   Return Visit with Elissa LONG Venkatesh Mckeon [facility] ([facility])    [address] [place] [address]   [phone]              MyChart Information     [employer] lets you send messages to your doctor, view your test results, renew your prescriptions, schedule appointments and more. To sign up, go to [email or web address] . Click on \"Log in\" on the left side of the screen, which will take you to the Welcome page. Then click on \"Sign up Now\" on the right side of the page.     You will be asked to enter the access code listed below, as well as some personal information. Please follow the directions to create your username and password.     Your access code is: [email or web address]  Expires: 5/15/2018 11:03 AM     Your access code will  in 90 days. If you need help or a new code, please call your [facility] clinic or [phone].        Care EveryWhere ID     This is your Care EveryWhere ID. This could be used by other organizations to access your [facility] medical records  [id number]        Equal Access to Services     OCTAVIA RUVALCABA AH: Steven Morales, jean claude mckinley, qahoward henriquez " corin knox ah. So [facility] [place] [phone].    ATENCIÓN: Si habla español, tiene a regalado disposición servicios gratuitos de asistencia lingüística. Llame al [phone].    We comply with applicable federal civil rights laws and [place] laws. We do not discriminate on the basis of race, color, national origin, age, disability, sex, sexual orientation, or gender identity.

## 2018-06-14 ENCOUNTER — OFFICE VISIT (OUTPATIENT)
Dept: PSYCHOLOGY | Facility: CLINIC | Age: 28
End: 2018-06-14
Payer: COMMERCIAL

## 2018-06-14 DIAGNOSIS — F41.1 GENERALIZED ANXIETY DISORDER: Primary | ICD-10-CM

## 2018-06-14 DIAGNOSIS — F32.2 MAJOR DEPRESSIVE DISORDER, SINGLE EPISODE, SEVERE WITH ANXIOUS DISTRESS (H): ICD-10-CM

## 2018-06-14 PROCEDURE — 90834 PSYTX W PT 45 MINUTES: CPT | Performed by: SOCIAL WORKER

## 2018-06-14 NOTE — MR AVS SNAPSHOT
MRN:[id number]                      After Visit Summary   6/14/2018    Marvin Alves    MRN: [id number]           Visit Information        Provider Department      6/14/2018 1:30 PM Elissa Mckeon, RHIANNA [facility] Generic      Your next 10 appointments already scheduled     Jul 12, 2018  1:30 PM CDT   Return Visit with Elissa Mckeon NEO   [facility] ([facility])    [address] [place] [phone]   [phone]              Care EveryWhere ID     This is your Care EveryWhere ID. This could be used by other organizations to access your [facility] medical records  [id number]        Equal Access to Services     OCTAVIA RUVALCABA : Hadii alin Morales, wanubiada elías, qaybta kaalmada lisette, corin fletcher. So [facility] [phone].    ATENCIÓN: Si habla español, tiene a regalado disposición servicios gratuitos de asistencia lingüística. Llame al [phone].    We comply with applicable federal civil rights laws and [place] laws. We do not discriminate on the basis of race, color, national origin, age, disability, sex, sexual orientation, or gender identity.

## 2018-06-15 NOTE — PROGRESS NOTES
"                                             Progress Note    Client Name: Marvin Alves  Date: 6/14/2018         Service Type: Individual      Session Start Time: 1:35 PM  Session End Time: 2:25 PM      Session Length: 50 Minutes     Session #: 9     Attendees: Client    Treatment Plan Last Reviewed: 5/2/2018  PHQ-9 / KWASI-7 : 25/21     DATA      Progress Since Last Session (Related to Symptoms / Goals / Homework):   Symptoms: Stable    Homework: N/A        Episode of Care Goals: Satisfactory progress - ACTION (Actively working towards change); Intervened by reinforcing change plan / affirming steps taken.     Current / Ongoing Stressors and Concerns:   The client reported that his primary stressor continues to be his difficulty managing the symptoms of anxiety that he has been experiencing.      Treatment Objective(s) Addressed in This Session:   The client will participate in EMDR treatment to reprocess traumatic memories.     Intervention:   EMDR: Completed phase two of EMDR treatment. Obtained a commitment from the client to use the \"calm place\" imagery daily.        ASSESSMENT: Current Emotional / Mental Status (status of significant symptoms):   Risk status (Self / Other harm or suicidal ideation)   Client denies current fears or concerns for personal safety.   Client denies current or recent suicidal ideation or behaviors.   Client denies current or recent homicidal ideation or behaviors.   Client denies current or recent self injurious behavior or ideation.   Client denies other safety concerns.   A safety and risk management plan has not been developed at this time, however client was given the after-hours number / 911 should there be a change in any of these risk factors.     Appearance:   Appropriate    Eye Contact:   Good    Psychomotor Behavior: Restless    Attitude:   Cooperative    Orientation:   All   Speech    Rate / Production: Normal     Volume:  Normal    Mood:    Anxious " "   Affect:    Appropriate    Thought Content:  Clear    Thought Form:  Coherent  Logical    Insight:    Fair      Medication Review:   No current psychiatric medications prescribed.     Medication Compliance:   N/A     Changes in Health Issues:   None reported.     Chemical Use Review:   Substance Use: Chemical use reviewed. No active concerns identified.      Tobacco Use: No change in amount of tobacco use since last session.  Patient declined discussion at this time.     Collateral Reports Completed:   Not Applicable    PLAN: (Client Tasks / Therapist Tasks / Other)  Use the \"calm place\" imagery daily.         SUKH Velasco., MILO.                                                         ________________________________________________________________________    Treatment Plan    Client's Name: Marvin Alves  YOB: 1990    Date: 5/2/2018    DSM-V Diagnoses:     296.23 (F32.2) Major Depressive Disorder, Single Episode, Severe With Anxious Distress  300.02 (F41.1) Generalized Anxiety Disorder    Psychosocial / Contextual Factors: The client is a [age] year old, , single male who resides with his mother, his father and his older brother in a home in [place], [place]. The client is currently unemployed. The client reported that he began experiencing symptoms of anxiety approximately a year and a half ago. He indicated that he began experiencing symptoms of depression shortly thereafter. The client reported the reason for seeking therapy at this time is his lack of interest or ability to function in life. He indicated that he is not employed and that he has no motivation to do anything at all.    WHODAS: 52    Referral / Collaboration:  Referral to another professional/service is not indicated at this time.    Anticipated number of session or this episode of care: 12      MeasurableTreatment Goal(s) related to diagnosis / functional impairment(s)  Goal 1: The client will learn " "and apply skills to manage the symptoms of anxiety that he has been experiencing.    I will know I've met my goal when I can relax.      Objective #A     The client will identify specific fears / thoughts that contribute to feeling anxious.  Status: Continued - Date(s): 5/2/2018     Intervention(s)  Therapist will assist the client in identifying specific fears/thoughts that contribute to feeling anxious.    Objective #B  The client will use  worry time  each day for 30 minutes of scheduled worry and then defer obsessive or anxious thinking until the next structured worry time.  Status: Continued - Date(s): 5/2/2018     Intervention(s)  Therapist will teach and support the client in learning and using \"worry time\".    Objective #C  The client will use relaxation strategies two times per day to reduce the physical symptoms of anxiety.  Status: Continued - Date(s): 5/2/2018     Intervention(s)  Therapist will teach and support the client in learning and using relaxation strategies.    Objective #D  The client will use cognitive strategies identified in therapy to challenge anxious thoughts.    Status: Continued - Date(s): 5/2/2018     Intervention(s)  Therapist will teach and support the client in using cognitive strategies to challenge anxious thoughts.    Objective #E  The client will be mindful of the body sensations that he is experiencing when he is feeling anxious.  Status: Continued - Date(s): 5/2/2018     Intervention(s)  Therapist will  and support the client in being mindful of the body sensations that he is experiencing when he is feeling anxious.    Objective #F  The client will participate in EMDR treatment to reprocess traumatic memories.  Status: New - Date: 5/2/2018     Intervention(s)  Therapist will utilize EMDR treatment to facilitate the client's reprocessing of traumatic memories.      The client has reviewed and agreed to the above plan.      Elissa Mckeon, M.S.W., L.I.C.S.W.  May 2, " 2018

## 2018-07-06 ENCOUNTER — OFFICE VISIT (OUTPATIENT)
Dept: FAMILY MEDICINE | Facility: CLINIC | Age: 28
End: 2018-07-06
Payer: COMMERCIAL

## 2018-07-06 VITALS
RESPIRATION RATE: 18 BRPM | HEART RATE: 93 BPM | BODY MASS INDEX: 32.58 KG/M2 | SYSTOLIC BLOOD PRESSURE: 134 MMHG | WEIGHT: 220 LBS | HEIGHT: 69 IN | DIASTOLIC BLOOD PRESSURE: 82 MMHG | TEMPERATURE: 99.4 F

## 2018-07-06 DIAGNOSIS — K58.9 IRRITABLE BOWEL SYNDROME, UNSPECIFIED TYPE: Primary | ICD-10-CM

## 2018-07-06 PROCEDURE — 99213 OFFICE O/P EST LOW 20 MIN: CPT | Performed by: FAMILY MEDICINE

## 2018-07-06 NOTE — MR AVS SNAPSHOT
After Visit Summary   7/6/2018    Marvin Alves    MRN: [id number]           Patient Information     Date Of Birth          1990        Visit Information        Provider Department      7/6/2018 2:00 PM Kannan Emery MD [facility]        Today's Diagnoses     Irritable bowel syndrome, unspecified type    -  1      Care Instructions          Thank you for choosing [facility].  You may be receiving a survey in the mail from [facility] regarding your visit today.  Please take a few minutes to complete and return the survey to let us know how we are doing.      Our Clinic hours are:  Mondays    7:20 am - 7 pm  Tues -  Fri  7:20 am - 5 pm    Clinic Phone: [phone]    The clinic lab opens at 7:30 am Mon - Fri and appointments are required.    [facility]  Ph. [phone]  Monday  8 am - 7pm  Tues - Fri 8 am - 5:30 pm                 Follow-ups after your visit        Your next 10 appointments already scheduled     Jul 12, 2018  1:30 PM CDT   Return Visit with RHIANNA Estrada   [facility] ([facility])    [address] [place] [address] [phone]              Who to contact     If you have questions or need follow up information about today's clinic visit or your schedule please contact [facility] directly at [phone].  Normal or non-critical lab and imaging results will be communicated to you by MyChart, letter or phone within 4 business days after the clinic has received the results. If you do not hear from us within 7 days, please contact the clinic through MyChart or phone. If you have a critical or abnormal lab result, we will notify you by phone as soon as possible.  Submit refill requests through [employer] or call your pharmacy and they will forward the refill request to us. Please allow 3 business days for your refill to be completed.          Additional  "Information About Your Visit        Care EveryWhere ID     This is your Care EveryWhere ID. This could be used by other organizations to access your [facility] medical records  [id number]        Your Vitals Were     Pulse Temperature Respirations Height BMI (Body Mass Index)       93 99.4  F (37.4  C) 18 5' 9\" (1.753 m) 32.49 kg/m2        Blood Pressure from Last 3 Encounters:   07/06/18 134/82   11/10/17 138/75   03/23/17 123/68    Weight from Last 3 Encounters:   07/06/18 220 lb (99.8 kg)   11/10/17 189 lb (85.7 kg)   03/23/17 220 lb (99.8 kg)              We Performed the Following     DEPRESSION ACTION PLAN (DAP)        Primary Care Provider Office Phone # Fax #    [facility] [phone] [address] [facility] [address]        Equal Access to Services     OCTAVIA RUVALCABA : Hadii alin delgadilloo Sobety, waaxda luqadaha, qaybta kaalmada adejaniyada, corin viramontes . So [facility] [phone].    ATENCIÓN: Si habla español, tiene a regalado disposición servicios gratuitos de asistencia lingüística. Llame al [phone].    We comply with applicable federal civil rights laws and [place] laws. We do not discriminate on the basis of race, color, national origin, age, disability, sex, sexual orientation, or gender identity.            Thank you!     Thank you for choosing [facility]  for your care. Our goal is always to provide you with excellent care. Hearing back from our patients is one way we can continue to improve our services. Please take a few minutes to complete the written survey that you may receive in the mail after your visit with us. Thank you!             Your Updated Medication List - Protect others around you: Learn how to safely use, store and throw away your medicines at www.disposemymeds.org.          This list is accurate as of 7/6/18  2:43 PM.  Always use your most recent med list.                   Brand Name Dispense " Instructions for use Diagnosis    NO ACTIVE MEDICATIONS      .

## 2018-07-06 NOTE — PROGRESS NOTES
"  SUBJECTIVE:   Marvin Alves is a 28 year old male who presents to clinic today for the following health issues:      ABDOMINAL PAIN     Onset: about 2-3 mo     Description:   Character: Gnawing  Location: right upper quadrant left upper quadrant right lower quadrant left lower quadrant  Radiation: Back    Intensity: mild    Progression of Symptoms:  intermittent    Accompanying Signs & Symptoms:  Fever/Chills?: no   Gas/Bloating: no   Nausea: YES  Vomitting: no   Diarrhea?: no   Constipation:YES  Dysuria or Hematuria: no    History:   Trauma: no   Previous similar pain: no    Previous tests done: none    Precipitating factors:   Does the pain change with:     Food: YES-  Nuts , sugar foods      BM: YES    Urination: no     Alleviating factors:  staying away from nuts     Therapies Tried and outcome: none    LMP:  not applicable           Problem list and histories reviewed & adjusted, as indicated.  Additional history:         Reviewed and updated as needed this visit by clinical staff  Tobacco  Allergies  Meds  Med Hx  Surg Hx  Fam Hx  Soc Hx      Reviewed and updated as needed this visit by Provider      Further history obtained, clarified or corrected by physician:  He has crampy abdominal pain intermittently that usually is relieved with bowel movement.  He thinks he has diverticulosis.  He has never had a prolonged episode of pain and never had associated fever.  He has not had any blood in the stool.    OBJECTIVE:  /82  Pulse 93  Temp 99.4  F (37.4  C)  Resp 18  Ht 5' 9\" (1.753 m)  Wt 220 lb (99.8 kg)  BMI 32.49 kg/m2  LUNGS: clear to auscultation, normal breath sounds  CV: RRR without murmur  ABD: BS+, soft, nontender, no masses, no hepatosplenomegaly  EXTREMITIES: without joint tenderness, swelling or erythema.  No muscle tenderness or abnormality.  SKIN: No rashes or abnormalities  NEURO:non focal exam    ASSESSMENT:  Irritable bowel syndrome, unspecified type    PLAN:  We discussed the " possibility of diverticulosis and how to manage that  Irritable bowel syndrome he can manage with lifestyle and diet management.    Orders Placed This Encounter     DEPRESSION ACTION PLAN (DAP)

## 2018-07-06 NOTE — LETTER
My Depression Action Plan  Name: Marvin Alves   Date of Birth 1990  Date: 7/6/2018    My doctor: [facility]   My clinic: [facility] [place]  [address]  [facility] [address]  [phone]          GREEN    ZONE   Good Control    What it looks like:     Things are going generally well. You have normal up s and down s. You may even feel depressed from time to time, but bad moods usually last less than a day.   What you need to do:  1. Continue to care for yourself (see self care plan)  2. Check your depression survival kit and update it as needed  3. Follow your physician s recommendations including any medication.  4. Do not stop taking medication unless you consult with your physician first.           YELLOW         ZONE Getting Worse    What it looks like:     Depression is starting to interfere with your life.     It may be hard to get out of bed; you may be starting to isolate yourself from others.    Symptoms of depression are starting to last most all day and this has happened for several days.     You may have suicidal thoughts but they are not constant.   What you need to do:     1. Call your care team, your response to treatment will improve if you keep your care team informed of your progress. Yellow periods are signs an adjustment may need to be made.     2. Continue your self-care, even if you have to fake it!    3. Talk to someone in your support network    4. Open up your depression survival kit           RED    ZONE Medical Alert - Get Help    What it looks like:     Depression is seriously interfering with your life.     You may experience these or other symptoms: You can t get out of bed most days, can t work or engage in other necessary activities, you have trouble taking care of basic hygiene, or basic responsibilities, thoughts of suicide or death that will not go away, self-injurious behavior.     What you need to do:  1. Call your  care team and request a same-day appointment. If they are not available (weekends or after hours) call your local crisis line, emergency room or 911.            Depression Self Care Plan / Survival Kit    Self-Care for Depression  Here s the deal. Your body and mind are really not as separate as most people think.  What you do and think affects how you feel and how you feel influences what you do and think. This means if you do things that people who feel good do, it will help you feel better.  Sometimes this is all it takes.  There is also a place for medication and therapy depending on how severe your depression is, so be sure to consult with your medical provider and/ or Behavioral Health Consultant if your symptoms are worsening or not improving.     In order to better manage my stress, I will:    Exercise  Get some form of exercise, every day. This will help reduce pain and release endorphins, the  feel good  chemicals in your brain. This is almost as good as taking antidepressants!  This is not the same as joining a gym and then never going! (they count on that by the way ) It can be as simple as just going for a walk or doing some gardening, anything that will get you moving.      Hygiene   Maintain good hygiene (Get out of bed in the morning, Make your bed, Brush your teeth, Take a shower, and Get dressed like you were going to work, even if you are unemployed).  If your clothes don't fit try to get ones that do.    Diet  I will strive to eat foods that are good for me, drink plenty of water, and avoid excessive sugar, caffeine, alcohol, and other mood-altering substances.  Some foods that are helpful in depression are: complex carbohydrates, B vitamins, flaxseed, fish or fish oil, fresh fruits and vegetables.    Psychotherapy  I agree to participate in Individual Therapy (if recommended).    Medication  If prescribed medications, I agree to take them.  Missing doses can result in serious side effects.  I  understand that drinking alcohol, or other illicit drug use, may cause potential side effects.  I will not stop my medication abruptly without first discussing it with my provider.    Staying Connected With Others  I will stay in touch with my friends, family members, and my primary care provider/team.    Use your imagination  Be creative.  We all have a creative side; it doesn t matter if it s oil painting, sand castles, or mud pies! This will also kick up the endorphins.    Witness Beauty  (AKA stop and smell the roses) Take a look outside, even in mid-winter. Notice colors, textures. Watch the squirrels and birds.     Service to others  Be of service to others.  There is always someone else in need.  By helping others we can  get out of ourselves  and remember the really important things.  This also provides opportunities for practicing all the other parts of the program.    Humor  Laugh and be silly!  Adjust your TV habits for less news and crime-drama and more comedy.    Control your stress  Try breathing deep, massage therapy, biofeedback, and meditation. Find time to relax each day.     My support system    Clinic Contact:  Phone number:    Contact 1:  Phone number:    Contact 2:  Phone number:    [place]/:  Phone number:    Therapist:  Phone number:    Local crisis center:    Phone number:    Other community support:  Phone number:

## 2018-07-06 NOTE — PATIENT INSTRUCTIONS
Thank you for choosing [facility].  You may be receiving a survey in the mail from [facility] regarding your visit today.  Please take a few minutes to complete and return the survey to let us know how we are doing.      Our Clinic hours are:  Mondays    7:20 am - 7 pm  Tues - Fri  7:20 am - 5 pm    Clinic Phone: [phone]    The clinic lab opens at 7:30 am Mon - Fri and appointments are required.    [facility]. [phone]  Monday  8 am - 7pm  Tues - Fri 8 am - 5:30 pm

## 2018-07-07 ASSESSMENT — PATIENT HEALTH QUESTIONNAIRE - PHQ9: SUM OF ALL RESPONSES TO PHQ QUESTIONS 1-9: 3

## 2018-07-12 ENCOUNTER — OFFICE VISIT (OUTPATIENT)
Dept: PSYCHOLOGY | Facility: CLINIC | Age: 28
End: 2018-07-12
Payer: COMMERCIAL

## 2018-07-12 DIAGNOSIS — F41.1 GENERALIZED ANXIETY DISORDER: ICD-10-CM

## 2018-07-12 DIAGNOSIS — F32.2 MAJOR DEPRESSIVE DISORDER, SINGLE EPISODE, SEVERE WITH ANXIOUS DISTRESS (H): Primary | ICD-10-CM

## 2018-07-12 PROCEDURE — 90834 PSYTX W PT 45 MINUTES: CPT | Performed by: SOCIAL WORKER

## 2018-07-12 NOTE — PROGRESS NOTES
"                                             Progress Note    Client Name: Marvin Alves  Date: 7/12/2018         Service Type: Individual      Session Start Time: 1:35 PM  Session End Time: 2:20 PM      Session Length: 45 Minutes     Session #: 10     Attendees: Client    Treatment Plan Last Reviewed: 5/2/2018  PHQ-9 / KWASI-7 : 25/21     DATA      Progress Since Last Session (Related to Symptoms / Goals / Homework):   Symptoms: Improving : The client reported experiencing increased motivation, increased energy, decreased anhedonia and improved mood.    Homework: Achieved / completed to satisfaction. The client reported that he has been using the \"calm place\" imagery daily.        Episode of Care Goals: Satisfactory progress - ACTION (Actively working towards change); Intervened by reinforcing change plan / affirming steps taken.     Current / Ongoing Stressors and Concerns:   The client reported that his stress has been low and his mood has improved. Because his mood has improved, he stated that he would like to postpone participating in EMDR treatment at this time.     Treatment Objective(s) Addressed in This Session:   The client will participate in EMDR treatment to reprocess traumatic memories.     Intervention:   EMDR: Discussed and reinforced the client's efforts to use the \"calm place\" imagery daily. Talked about his desire to postpone participating in EMDR treatment. Examined the client's decreased symptoms of depression and improved mood.        ASSESSMENT: Current Emotional / Mental Status (status of significant symptoms):   Risk status (Self / Other harm or suicidal ideation)   Client denies current fears or concerns for personal safety.   Client denies current or recent suicidal ideation or behaviors.   Client denies current or recent homicidal ideation or behaviors.   Client denies current or recent self injurious behavior or ideation.   Client denies other safety concerns.   A safety and risk " management plan has not been developed at this time, however client was given the after-hours number / 911 should there be a change in any of these risk factors.     Appearance:   Appropriate    Eye Contact:   Good    Psychomotor Behavior: Restless    Attitude:   Cooperative    Orientation:   All   Speech    Rate / Production: Normal     Volume:  Normal    Mood:    Normal   Affect:    Appropriate    Thought Content:  Clear    Thought Form:  Coherent  Logical    Insight:    Fair      Medication Review:   No current psychiatric medications prescribed.     Medication Compliance:   N/A     Changes in Health Issues:   None reported.     Chemical Use Review:   Substance Use: Chemical use reviewed. No active concerns identified.      Tobacco Use: No change in amount of tobacco use since last session.  Patient declined discussion at this time.     Collateral Reports Completed:   Not Applicable    PLAN: (Client Tasks / Therapist Tasks / Other)  N/A         Elissa Mckeon M.SPETR., MILO.                                                         ________________________________________________________________________    Treatment Plan    Client's Name: Marvin Alves  YOB: 1990    Date: 5/2/2018    DSM-V Diagnoses:     296.23 (F32.2) Major Depressive Disorder, Single Episode, Severe With Anxious Distress  300.02 (F41.1) Generalized Anxiety Disorder    Psychosocial / Contextual Factors: The client is a [age] year old, , single male who resides with his mother, his father and his older brother in a home in [place], [place]. The client is currently unemployed. The client reported that he began experiencing symptoms of anxiety approximately a year and a half ago. He indicated that he began experiencing symptoms of depression shortly thereafter. The client reported the reason for seeking therapy at this time is his lack of interest or ability to function in life. He indicated that he is not employed  "and that he has no motivation to do anything at all.    WHODAS: 52    Referral / Collaboration:  Referral to another professional/service is not indicated at this time.    Anticipated number of session or this episode of care: 12      MeasurableTreatment Goal(s) related to diagnosis / functional impairment(s)  Goal 1: The client will learn and apply skills to manage the symptoms of anxiety that he has been experiencing.    I will know I've met my goal when I can relax.      Objective #A     The client will identify specific fears / thoughts that contribute to feeling anxious.  Status: Continued - Date(s): 5/2/2018     Intervention(s)  Therapist will assist the client in identifying specific fears/thoughts that contribute to feeling anxious.    Objective #B  The client will use  worry time  each day for 30 minutes of scheduled worry and then defer obsessive or anxious thinking until the next structured worry time.  Status: Continued - Date(s): 5/2/2018     Intervention(s)  Therapist will teach and support the client in learning and using \"worry time\".    Objective #C  The client will use relaxation strategies two times per day to reduce the physical symptoms of anxiety.  Status: Continued - Date(s): 5/2/2018     Intervention(s)  Therapist will teach and support the client in learning and using relaxation strategies.    Objective #D  The client will use cognitive strategies identified in therapy to challenge anxious thoughts.    Status: Continued - Date(s): 5/2/2018     Intervention(s)  Therapist will teach and support the client in using cognitive strategies to challenge anxious thoughts.    Objective #E  The client will be mindful of the body sensations that he is experiencing when he is feeling anxious.  Status: Continued - Date(s): 5/2/2018     Intervention(s)  Therapist will  and support the client in being mindful of the body sensations that he is experiencing when he is feeling anxious.    Objective " #F  The client will participate in EMDR treatment to reprocess traumatic memories.  Status: New - Date: 5/2/2018     Intervention(s)  Therapist will utilize EMDR treatment to facilitate the client's reprocessing of traumatic memories.      The client has reviewed and agreed to the above plan.      MERCEDES Velasco.S.NEO., L.KASSANDRA.ANDRE.S.W.  May 2, 2018

## 2018-07-12 NOTE — MR AVS SNAPSHOT
MRN:[id number]                      After Visit Summary   7/12/2018    Marvin Alves    MRN: [id number]           Visit Information        Provider Department      7/12/2018 1:30 PM Elissa Mckeon, RHIANNA [facility] Generic      Your next 10 appointments already scheduled     Aug 16, 2018 11:30 AM CDT   Return Visit with Elissa Mckeon NEO   [facility] ([facility])    [address] [place] [address]   [phone]              Care EveryWhere ID     This is your Care EveryWhere ID. This could be used by other organizations to access your [place] medical records  [id number]        Equal Access to Services     OCTAVIA RUVALCABA : Hadii alin Morales, wanubiada elías, qaybta kaalmada lisette, corin fletcher. So [facility] [phone].    ATENCIÓN: Si habla español, tiene a regalado disposición servicios gratuitos de asistencia lingüística. Llame al [phone].    We comply with applicable federal civil rights laws and [place] laws. We do not discriminate on the basis of race, color, national origin, age, disability, sex, sexual orientation, or gender identity.

## 2018-08-16 ENCOUNTER — OFFICE VISIT (OUTPATIENT)
Dept: PSYCHOLOGY | Facility: CLINIC | Age: 28
End: 2018-08-16
Payer: COMMERCIAL

## 2018-08-16 DIAGNOSIS — F32.2 MAJOR DEPRESSIVE DISORDER, SINGLE EPISODE, SEVERE WITH ANXIOUS DISTRESS (H): ICD-10-CM

## 2018-08-16 DIAGNOSIS — F41.1 GENERALIZED ANXIETY DISORDER: Primary | ICD-10-CM

## 2018-08-16 PROCEDURE — 90834 PSYTX W PT 45 MINUTES: CPT | Performed by: SOCIAL WORKER

## 2018-08-16 NOTE — MR AVS SNAPSHOT
MRN:[id number]                      After Visit Summary   8/16/2018    Marvin Alves    MRN: [id number]           Visit Information        Provider Department      8/16/2018 11:30 AM Elissa Mckeon [facility] Generic      Your next 10 appointments already scheduled     Sep 14, 2018  1:30 PM CDT   Return Visit with Elissa Mckeon [facility] ([facility])    [address] [place] [address]   [phone]            Sep 21, 2018  2:30 PM CDT   Return Visit with Elissa Mckeon [facility] ([facility])    [address] [place] [address]   [phone]            Sep 28, 2018 10:30 AM CDT   Return Visit with Elissa Mckeon [facility] ([facility])    [address] [place] [address]   [phone]            Oct 05, 2018  1:30 PM CDT   Return Visit with Elissa Mckeon [facility] ([facility])    [address] [place] [address]   [phone]              Care EveryWhere ID     This is your Care EveryWhere ID. This could be used by other organizations to access your [place] medical records  [id number]        Equal Access to Services     OCTAVIA RUVALCABA AH: Hadii aad ku hadasho Soomaali, waaxda luqadaha, qaybta kaalmada adeegyada, waxjak idiin haybrittanyn alinajani welch'brii . So [facility] [phone].    ATENCIÓN: Si habla español, tiene a regalado disposición servicios gratuitos de asistencia lingüística. Llame al [phone].    We comply with applicable federal civil rights laws and [place] laws. We do not discriminate on the basis of race, color, national origin, age, disability, sex, sexual orientation, or gender identity.

## 2018-08-17 NOTE — PROGRESS NOTES
Progress Note    Client Name: Marvin Alves  Date: 8/16/2018         Service Type: Individual      Session Start Time: 11:45 AM  Session End Time: 12:30 PM      Session Length: 45 Minutes     Session #: 11     Attendees: Client    Treatment Plan Last Reviewed: 8/16/2018  PHQ-9 / KWASI-7 : 25/21     DATA      Progress Since Last Session (Related to Symptoms / Goals / Homework):   Symptoms: Improving : The client reported experiencing continued increased motivation, increased energy, decreased anhedonia and improved mood. He also stated that he has been feeling less anxious.    Homework: N/A        Episode of Care Goals: Satisfactory progress - ACTION (Actively working towards change); Intervened by reinforcing change plan / affirming steps taken.     Current / Ongoing Stressors and Concerns:   The client reported that his stress continues to be low.     Treatment Objective(s) Addressed in This Session:   The client will learn and apply skills to manage the symptoms of anxiety that he has been experiencing. The client will participate in the review and revision of the individual treatment plan.     Intervention:   Discussed the client's past month. Examined his decreased symptoms of depression and anxiety. Reviewed and revised the individual treatment plan.        ASSESSMENT: Current Emotional / Mental Status (status of significant symptoms):   Risk status (Self / Other harm or suicidal ideation)   Client denies current fears or concerns for personal safety.   Client denies current or recent suicidal ideation or behaviors.   Client denies current or recent homicidal ideation or behaviors.   Client denies current or recent self injurious behavior or ideation.   Client denies other safety concerns.   A safety and risk management plan has not been developed at this time, however client was given the after-hours number / 911 should there be a change in any of these risk  factors.     Appearance:   Appropriate    Eye Contact:   Good    Psychomotor Behavior: Restless    Attitude:   Cooperative    Orientation:   All   Speech    Rate / Production: Normal     Volume:  Normal    Mood:    Normal   Affect:    Appropriate    Thought Content:  Clear    Thought Form:  Coherent  Logical    Insight:    Good      Medication Review:   No current psychiatric medications prescribed.     Medication Compliance:   N/A     Changes in Health Issues:   None reported.     Chemical Use Review:   Substance Use: Chemical use reviewed. No active concerns identified.      Tobacco Use: No change in amount of tobacco use since last session.  Patient declined discussion at this time.     Collateral Reports Completed:   Not Applicable    PLAN: (Client Tasks / Therapist Tasks / Other)  N/A         SALAZAR Velasco, MILO.                                                         ________________________________________________________________________    Treatment Plan    Client's Name: Marvin Alves  YOB: 1990    Date: 8/16/2018    DSM-V Diagnoses:     296.23 (F32.2) Major Depressive Disorder, Single Episode, Severe With Anxious Distress  300.02 (F41.1) Generalized Anxiety Disorder    Psychosocial / Contextual Factors: The client is a [age] year old, , single male who resides with his mother, his father and his older brother in a home in [place], [place]. The client is currently unemployed. The client reported that he began experiencing symptoms of anxiety approximately two years ago. He indicated that he began experiencing symptoms of depression shortly thereafter. The client reported the reason for seeking therapy at this time is his lack of interest or ability to function in life. He indicated that he is not employed.    WHODAS: 52    Referral / Collaboration:  Referral to another professional/service is not indicated at this time.    Anticipated number of session or this  "episode of care: 12      MeasurableTreatment Goal(s) related to diagnosis / functional impairment(s)  Goal 1: The client will learn and apply skills to manage the symptoms of anxiety that he has been experiencing.    I will know I've met my goal when I can relax.      Objective #A     The client will identify specific fears / thoughts that contribute to feeling anxious.  Status: Continued - Date(s): 8/16/2018     Intervention(s)  Therapist will assist the client in identifying specific fears/thoughts that contribute to feeling anxious.    Objective #B  The client will use  worry time  each day for 30 minutes of scheduled worry and then defer obsessive or anxious thinking until the next structured worry time.  Status: Continued - Date(s): 8/16/2018     Intervention(s)  Therapist will teach and support the client in learning and using \"worry time\".    Objective #C  The client will use relaxation strategies two times per day to reduce the physical symptoms of anxiety.  Status: Continued - Date(s): 8/16/2018     Intervention(s)  Therapist will teach and support the client in learning and using relaxation strategies.    Objective #D  The client will use cognitive strategies identified in therapy to challenge anxious thoughts.    Status: Continued - Date(s): 8/16/2018     Intervention(s)  Therapist will teach and support the client in using cognitive strategies to challenge anxious thoughts.    Objective #E  The client will be mindful of the body sensations that he is experiencing when he is feeling anxious.  Status: Continued - Date(s): 8/16/2018     Intervention(s)  Therapist will  and support the client in being mindful of the body sensations that he is experiencing when he is feeling anxious.    Objective #F  The client will participate in EMDR treatment to reprocess traumatic memories.  Status: Continued - Date(s): 8/16/2018     Intervention(s)  Therapist will utilize EMDR treatment to facilitate the client's " reprocessing of traumatic memories.      The client has reviewed and agreed to the above plan.      Elissa Mckeon M.S.NEO., L.I.C.S.W.  August 16, 2018

## 2018-09-14 ENCOUNTER — OFFICE VISIT (OUTPATIENT)
Dept: PSYCHOLOGY | Facility: CLINIC | Age: 28
End: 2018-09-14
Payer: COMMERCIAL

## 2018-09-14 DIAGNOSIS — F41.1 GENERALIZED ANXIETY DISORDER: Primary | ICD-10-CM

## 2018-09-14 DIAGNOSIS — F32.2 MAJOR DEPRESSIVE DISORDER, SINGLE EPISODE, SEVERE WITH ANXIOUS DISTRESS (H): ICD-10-CM

## 2018-09-14 PROCEDURE — 90834 PSYTX W PT 45 MINUTES: CPT | Performed by: SOCIAL WORKER

## 2018-09-14 NOTE — MR AVS SNAPSHOT
MRN:[id number]                      After Visit Summary   9/14/2018    Marvin Alves    MRN: [id number]           Visit Information        Provider Department      9/14/2018 1:30 PM Elissa Mckeon, [facility] Generic      Your next 10 appointments already scheduled     Sep 21, 2018  2:30 PM CDT   Return Visit with Elissa Mckeon [facility] ([facility])    [address] [place] [address]   [phone]            Sep 28, 2018 10:30 AM CDT   Return Visit with Elissa Mckeon [facility] ([facility])    [address] [place] [address]   [phone]            Oct 05, 2018  1:30 PM CDT   Return Visit with Elissa Mckeon [facility] ([facility])    [address] [place] [address]   [phone]              Care EveryWhere ID     This is your Care EveryWhere ID. This could be used by other organizations to access your [place] medical records  [id number]        Equal Access to Services     OCTAVIA RUVALCABA AH: Hadii alin bradley hadasho Soheatherali, waaxda luqadaha, qaybta kaalmada adeegyada, corin fletcher. So [facility] [phone].    ATENCIÓN: Si habla español, tiene a regalado disposición servicios gratuitos de asistencia lingüística. Llsergo al [phone].    We comply with applicable federal civil rights laws and [place] laws. We do not discriminate on the basis of race, color, national origin, age, disability, sex, sexual orientation, or gender identity.

## 2018-09-15 NOTE — PROGRESS NOTES
Progress Note    Client Name: Marvin Alves  Date: 9/14/2018         Service Type: Individual      Session Start Time: 1:35 PM  Session End Time: 2:25 PM      Session Length: 50 Minutes     Session #: 12     Attendees: Client    Treatment Plan Last Reviewed: 8/16/2018  PHQ-9 / KWASI-7 : 25/21     DATA      Progress Since Last Session (Related to Symptoms / Goals / Homework):   Symptoms: Improving : The client reported experiencing continued increased motivation, increased energy, decreased anhedonia and improved mood. He also reported feeling anxious after driving to his individual therapy session.    Homework: N/A        Episode of Care Goals: Satisfactory progress - ACTION (Actively working towards change); Intervened by reinforcing change plan / affirming steps taken.     Current / Ongoing Stressors and Concerns:   The client reported that his stress continues to be low.     Treatment Objective(s) Addressed in This Session:   The client will learn and apply skills to manage the symptoms of anxiety that he has been experiencing.     Intervention:   Discussed the client's past month. Acknowledged the client's current anxious distress. Taught and practiced using a relaxation strategy in vivo. Obtained a commitment from the client to use the relaxation strategy as needed.        ASSESSMENT: Current Emotional / Mental Status (status of significant symptoms):   Risk status (Self / Other harm or suicidal ideation)   Client denies current fears or concerns for personal safety.   Client denies current or recent suicidal ideation or behaviors.   Client denies current or recent homicidal ideation or behaviors.   Client denies current or recent self injurious behavior or ideation.   Client denies other safety concerns.   A safety and risk management plan has not been developed at this time, however client was given the after-hours number / 911 should there be a change in any of  these risk factors.     Appearance:   Appropriate    Eye Contact:   Good    Psychomotor Behavior: Restless    Attitude:   Cooperative    Orientation:   All   Speech    Rate / Production: Normal     Volume:  Normal    Mood:    Anxious    Affect:    Appropriate    Thought Content:  Clear    Thought Form:  Coherent  Logical    Insight:    Good      Medication Review:   No current psychiatric medications prescribed.     Medication Compliance:   N/A     Changes in Health Issues:   None reported.     Chemical Use Review:   Substance Use: Chemical use reviewed. No active concerns identified.      Tobacco Use: No change in amount of tobacco use since last session.  Patient declined discussion at this time.     Collateral Reports Completed:   Not Applicable    PLAN: (Client Tasks / Therapist Tasks / Other)  Use the relaxation strategy as needed.        MERCEDES Velasco.SPETR., MILO.                                                         ________________________________________________________________________    Treatment Plan    Client's Name: Marvin Alves  YOB: 1990    Date: 8/16/2018    DSM-V Diagnoses:     296.23 (F32.2) Major Depressive Disorder, Single Episode, Severe With Anxious Distress  300.02 (F41.1) Generalized Anxiety Disorder    Psychosocial / Contextual Factors: The client is a [age] year old, , single male who resides with his mother, his father and his older brother in a home in [place], [place]. The client is currently unemployed. The client reported that he began experiencing symptoms of anxiety approximately two years ago. He indicated that he began experiencing symptoms of depression shortly thereafter. The client reported the reason for seeking therapy at this time is his lack of interest or ability to function in life. He indicated that he is not employed.    WHODAS: 52    Referral / Collaboration:  Referral to another professional/service is not indicated at this  "time.    Anticipated number of session or this episode of care: 12      MeasurableTreatment Goal(s) related to diagnosis / functional impairment(s)  Goal 1: The client will learn and apply skills to manage the symptoms of anxiety that he has been experiencing.    I will know I've met my goal when I can relax.      Objective #A     The client will identify specific fears / thoughts that contribute to feeling anxious.  Status: Continued - Date(s): 8/16/2018     Intervention(s)  Therapist will assist the client in identifying specific fears/thoughts that contribute to feeling anxious.    Objective #B  The client will use  worry time  each day for 30 minutes of scheduled worry and then defer obsessive or anxious thinking until the next structured worry time.  Status: Continued - Date(s): 8/16/2018     Intervention(s)  Therapist will teach and support the client in learning and using \"worry time\".    Objective #C  The client will use relaxation strategies two times per day to reduce the physical symptoms of anxiety.  Status: Continued - Date(s): 8/16/2018     Intervention(s)  Therapist will teach and support the client in learning and using relaxation strategies.    Objective #D  The client will use cognitive strategies identified in therapy to challenge anxious thoughts.    Status: Continued - Date(s): 8/16/2018     Intervention(s)  Therapist will teach and support the client in using cognitive strategies to challenge anxious thoughts.    Objective #E  The client will be mindful of the body sensations that he is experiencing when he is feeling anxious.  Status: Continued - Date(s): 8/16/2018     Intervention(s)  Therapist will  and support the client in being mindful of the body sensations that he is experiencing when he is feeling anxious.    Objective #F  The client will participate in EMDR treatment to reprocess traumatic memories.  Status: Continued - Date(s): 8/16/2018     Intervention(s)  Therapist will " utilize EMDR treatment to facilitate the client's reprocessing of traumatic memories.      The client has reviewed and agreed to the above plan.      MERCEDES Velasco.S.NEO., L.I.C.S.W.  August 16, 2018

## 2018-10-18 ENCOUNTER — OFFICE VISIT (OUTPATIENT)
Dept: PSYCHOLOGY | Facility: CLINIC | Age: 28
End: 2018-10-18
Payer: COMMERCIAL

## 2018-10-18 DIAGNOSIS — F32.2 MAJOR DEPRESSIVE DISORDER, SINGLE EPISODE, SEVERE WITH ANXIOUS DISTRESS (H): ICD-10-CM

## 2018-10-18 DIAGNOSIS — F41.1 GENERALIZED ANXIETY DISORDER: Primary | ICD-10-CM

## 2018-10-18 PROCEDURE — 90834 PSYTX W PT 45 MINUTES: CPT | Performed by: SOCIAL WORKER

## 2018-10-18 NOTE — MR AVS SNAPSHOT
MRN:[id number]                      After Visit Summary   10/18/2018    Marvin Alves    MRN: [id number]           Visit Information        Provider Department      10/18/2018 3:30 PM Elissa Mckeon, [facility] Generic      Your next 10 appointments already scheduled     Oct 26, 2018  2:30 PM CDT   Return Visit with Elissa MERCEDES Venkatesh Mckeon [facility] ([facility])    [address] [place] [phone]   [phone]            Nov 02, 2018 10:30 AM CDT   Return Visit with Elissa MERCEDES Venkatesh Mckeon [facility] ([facility])    [address] [place] [phone]   [phone]            Nov 13, 2018  3:30 PM CST   Return Visit with Elissa Mckeon [facility] ([facility])    [address] [place] [phone]   [phone]              Care EveryWhere ID     This is your Care EveryWhere ID. This could be used by other organizations to access your [facility] medical records  [id number]        Equal Access to Services     OCTAVIA RUVALCABA AH: Hadii alin bradley hadasho Soheatherali, waaxda luqadaha, qaybta kaalmada adeegyada, corin fletcher. So [facility] [phone].    ATENCIÓN: Si habla español, tiene a regalado disposición servicios gratuitos de asistencia lingüística. Llsergo al [phone].    We comply with applicable federal civil rights laws and [place] laws. We do not discriminate on the basis of race, color, national origin, age, disability, sex, sexual orientation, or gender identity.

## 2018-10-19 NOTE — PROGRESS NOTES
Progress Note    Client Name: Marvin Alves  Date: 10/18/2018         Service Type: Individual      Session Start Time: 3:50 PM  Session End Time: 4:30 PM      Session Length: 40 Minutes     Session #: 13     Attendees: Client    Treatment Plan Last Reviewed: 8/16/2018  PHQ-9 / KWASI-7 : 25/21     DATA      Progress Since Last Session (Related to Symptoms / Goals / Homework):   Symptoms: Improving : The client reported experiencing continued increased motivation, increased energy, decreased anhedonia and improved mood. He also stated that he is feeling less anxious overall.    Homework: Partially completed. The client reported that he has been using the relaxation strategy sometimes as needed.        Episode of Care Goals: Satisfactory progress - ACTION (Actively working towards change); Intervened by reinforcing change plan / affirming steps taken.     Current / Ongoing Stressors and Concerns:   The client reported that his stress continues to be low. He stated that he is considering auditioning for a band that he really enjoys. He also indicated that he purchased a piano.     Treatment Objective(s) Addressed in This Session:   The client will use relaxation strategies two times per day to reduce the physical symptoms of anxiety. The client will be mindful of the body sensations that he is experiencing when he is feeling anxious.     Intervention:   Discussed the client's past month. Reinforced his efforts to use the relaxation strategy. Obtained a commitment from the client to continue to use the relaxation strategy as needed. Provided didactic information about the physiological changes associated with emotions. Coached the client on being mindful of the physiological changes and body sensations that he was experiencing. Obtained a commitment from the client to attempt to be mindful of the body sensations that he is experiencing when he is feeling  anxious.        ASSESSMENT: Current Emotional / Mental Status (status of significant symptoms):   Risk status (Self / Other harm or suicidal ideation)   Client denies current fears or concerns for personal safety.   Client denies current or recent suicidal ideation or behaviors.   Client denies current or recent homicidal ideation or behaviors.   Client denies current or recent self injurious behavior or ideation.   Client denies other safety concerns.   A safety and risk management plan has not been developed at this time, however client was given the after-hours number / 911 should there be a change in any of these risk factors.     Appearance:   Appropriate    Eye Contact:   Good    Psychomotor Behavior: Restless    Attitude:   Cooperative    Orientation:   All   Speech    Rate / Production: Normal     Volume:  Normal    Mood:    Normal   Affect:    Appropriate    Thought Content:  Clear    Thought Form:  Coherent  Logical    Insight:    Good      Medication Review:   No current psychiatric medications prescribed.     Medication Compliance:   N/A     Changes in Health Issues:   None reported.     Chemical Use Review:   Substance Use: Chemical use reviewed. No active concerns identified.      Tobacco Use: No change in amount of tobacco use since last session.  Patient declined discussion at this time.     Collateral Reports Completed:   Not Applicable    PLAN: (Client Tasks / Therapist Tasks / Other)  Use the relaxation strategy as needed. Attempt to be mindful of the body sensations that he is experiencing when he is feeling anxious.        Elissa Mckeon, M.S.W., L.I.C.S.W.                                                         ________________________________________________________________________    Treatment Plan    Client's Name: Marvin Alves  YOB: 1990    Date: 8/16/2018    DSM-V Diagnoses:     296.23 (F32.2) Major Depressive Disorder, Single Episode, Severe With Anxious Distress  300.02  "(F41.1) Generalized Anxiety Disorder    Psychosocial / Contextual Factors: The client is a [age] year old, , single male who resides with his mother, his father and his older brother in a home in [place], [place]. The client is currently unemployed. The client reported that he began experiencing symptoms of anxiety approximately two years ago. He indicated that he began experiencing symptoms of depression shortly thereafter. The client reported the reason for seeking therapy at this time is his lack of interest or ability to function in life. He indicated that he is not employed.    WHODAS: 52    Referral / Collaboration:  Referral to another professional/service is not indicated at this time.    Anticipated number of session or this episode of care: 12      MeasurableTreatment Goal(s) related to diagnosis / functional impairment(s)  Goal 1: The client will learn and apply skills to manage the symptoms of anxiety that he has been experiencing.    I will know I've met my goal when I can relax.      Objective #A     The client will identify specific fears / thoughts that contribute to feeling anxious.  Status: Continued - Date(s): 8/16/2018     Intervention(s)  Therapist will assist the client in identifying specific fears/thoughts that contribute to feeling anxious.    Objective #B  The client will use  worry time  each day for 30 minutes of scheduled worry and then defer obsessive or anxious thinking until the next structured worry time.  Status: Continued - Date(s): 8/16/2018     Intervention(s)  Therapist will teach and support the client in learning and using \"worry time\".    Objective #C  The client will use relaxation strategies two times per day to reduce the physical symptoms of anxiety.  Status: Continued - Date(s): 8/16/2018     Intervention(s)  Therapist will teach and support the client in learning and using relaxation strategies.    Objective #D  The client will use cognitive strategies " identified in therapy to challenge anxious thoughts.    Status: Continued - Date(s): 8/16/2018     Intervention(s)  Therapist will teach and support the client in using cognitive strategies to challenge anxious thoughts.    Objective #E  The client will be mindful of the body sensations that he is experiencing when he is feeling anxious.  Status: Continued - Date(s): 8/16/2018     Intervention(s)  Therapist will  and support the client in being mindful of the body sensations that he is experiencing when he is feeling anxious.    Objective #F  The client will participate in EMDR treatment to reprocess traumatic memories.  Status: Continued - Date(s): 8/16/2018     Intervention(s)  Therapist will utilize EMDR treatment to facilitate the client's reprocessing of traumatic memories.      The client has reviewed and agreed to the above plan.      Elissa Mckeon M.S.W., L.I.C.S.W.  August 16, 2018

## 2018-11-02 ENCOUNTER — OFFICE VISIT (OUTPATIENT)
Dept: PSYCHOLOGY | Facility: CLINIC | Age: 28
End: 2018-11-02
Payer: COMMERCIAL

## 2018-11-02 DIAGNOSIS — F41.1 GENERALIZED ANXIETY DISORDER: Primary | ICD-10-CM

## 2018-11-02 DIAGNOSIS — F32.2 MAJOR DEPRESSIVE DISORDER, SINGLE EPISODE, SEVERE WITH ANXIOUS DISTRESS (H): ICD-10-CM

## 2018-11-02 PROCEDURE — 90834 PSYTX W PT 45 MINUTES: CPT | Performed by: SOCIAL WORKER

## 2018-11-02 NOTE — PROGRESS NOTES
Progress Note    Client Name: Marvin Alves  Date: 11/2/2018         Service Type: Individual      Session Start Time: 10:45 AM  Session End Time: 11:30 AM      Session Length: 45 Minutes     Session #: 14     Attendees: Client    Treatment Plan Last Reviewed: 8/16/2018  PHQ-9 / KWASI-7 : 25/21     DATA      Progress Since Last Session (Related to Symptoms / Goals / Homework):   Symptoms: Worsening : The client reported experiencing an increase in anxiety over the past three weeks.    Homework: Did not complete. The client reported that he has not been using the relaxation strategy as needed. He also stated that he has not attempted to be mindful of the body sensations that he is experiencing when he is feeling anxious.        Episode of Care Goals: No improvement - RELAPSE (Returned to unhealthy behavior); Intervened by reassessing readiness to change and identifying appropriate stage.  Identified reasons for relapse, successes, and change talk.     Current / Ongoing Stressors and Concerns:   The client reported that his stress has increased. He indicated that he has been experiencing some neck pain.     Treatment Objective(s) Addressed in This Session:   The client will use relaxation strategies two times per day to reduce the physical symptoms of anxiety.      Intervention:   Discussed the client's increased neck pain. Practiced using a relaxation strategy and bilateral stimulation in vivo to reduce the pain. Talked about the possible usefulness of using bilateral stimulation with sound to reduce the client's anxiety and pain. Obtained a commitment from the client to use bilateral stimulation with sound to reduce his anxiety and neck pain.        ASSESSMENT: Current Emotional / Mental Status (status of significant symptoms):   Risk status (Self / Other harm or suicidal ideation)   Client denies current fears or concerns for personal safety.   Client denies current or  recent suicidal ideation or behaviors.   Client denies current or recent homicidal ideation or behaviors.   Client denies current or recent self injurious behavior or ideation.   Client denies other safety concerns.   A safety and risk management plan has not been developed at this time, however client was given the after-hours number / 911 should there be a change in any of these risk factors.     Appearance:   Appropriate    Eye Contact:   Good    Psychomotor Behavior: Restless    Attitude:   Cooperative    Orientation:   All   Speech    Rate / Production: Normal     Volume:  Normal    Mood:    Anxious    Affect:    Appropriate    Thought Content:  Clear    Thought Form:  Coherent  Logical    Insight:    Good      Medication Review:   No current psychiatric medications prescribed.     Medication Compliance:   N/A     Changes in Health Issues:   None reported.     Chemical Use Review:   Substance Use: Chemical use reviewed. No active concerns identified.      Tobacco Use: No change in amount of tobacco use since last session.  Patient declined discussion at this time.     Collateral Reports Completed:   Not Applicable    PLAN: (Client Tasks / Therapist Tasks / Other)  Use bilateral stimulation with sound to reduce his anxiety and neck pain.        Elisas Mckeon M.S.W., L.KASSANDRA.ANDRE.S.W.                                                         ________________________________________________________________________    Treatment Plan    Client's Name: Marvin Alves  YOB: 1990    Date: 8/16/2018    DSM-V Diagnoses:     296.23 (F32.2) Major Depressive Disorder, Single Episode, Severe With Anxious Distress  300.02 (F41.1) Generalized Anxiety Disorder    Psychosocial / Contextual Factors: The client is a [age] year old, , single male who resides with his mother, his father and his older brother in a home in [place], [place]. The client is currently unemployed. The client reported that he began  "experiencing symptoms of anxiety approximately two years ago. He indicated that he began experiencing symptoms of depression shortly thereafter. The client reported the reason for seeking therapy at this time is his lack of interest or ability to function in life. He indicated that he is not employed.    WHODAS: 52    Referral / Collaboration:  Referral to another professional/service is not indicated at this time.    Anticipated number of session or this episode of care: 12      MeasurableTreatment Goal(s) related to diagnosis / functional impairment(s)  Goal 1: The client will learn and apply skills to manage the symptoms of anxiety that he has been experiencing.    I will know I've met my goal when I can relax.      Objective #A     The client will identify specific fears / thoughts that contribute to feeling anxious.  Status: Continued - Date(s): 8/16/2018     Intervention(s)  Therapist will assist the client in identifying specific fears/thoughts that contribute to feeling anxious.    Objective #B  The client will use  worry time  each day for 30 minutes of scheduled worry and then defer obsessive or anxious thinking until the next structured worry time.  Status: Continued - Date(s): 8/16/2018     Intervention(s)  Therapist will teach and support the client in learning and using \"worry time\".    Objective #C  The client will use relaxation strategies two times per day to reduce the physical symptoms of anxiety.  Status: Continued - Date(s): 8/16/2018     Intervention(s)  Therapist will teach and support the client in learning and using relaxation strategies.    Objective #D  The client will use cognitive strategies identified in therapy to challenge anxious thoughts.    Status: Continued - Date(s): 8/16/2018     Intervention(s)  Therapist will teach and support the client in using cognitive strategies to challenge anxious thoughts.    Objective #E  The client will be mindful of the body sensations that he is " experiencing when he is feeling anxious.  Status: Continued - Date(s): 8/16/2018     Intervention(s)  Therapist will  and support the client in being mindful of the body sensations that he is experiencing when he is feeling anxious.    Objective #F  The client will participate in EMDR treatment to reprocess traumatic memories.  Status: Continued - Date(s): 8/16/2018     Intervention(s)  Therapist will utilize EMDR treatment to facilitate the client's reprocessing of traumatic memories.      The client has reviewed and agreed to the above plan.      Elissa Mckeon, M.S.W., L.I.C.S.W.  August 16, 2018

## 2018-11-02 NOTE — MR AVS SNAPSHOT
MRN:[id number]                      After Visit Summary   11/2/2018    Marvin Alves    MRN: [id number]           Visit Information        Provider Department      11/2/2018 10:30 AM Elissa Mckeon, [facility] Generic      Your next 10 appointments already scheduled     Nov 13, 2018  3:30 PM CST   Return Visit with Elissa LONG Venkatesh Mckeon [facility] ([facility])    [address] [place] [phone]   [phone]            Dec 06, 2018  9:30 AM CST   Return Visit with Elissa LONG Venkatesh Mckeon [facility] ([facility])    [address] [place] [phone]   [phone]              Care EveryWhere ID     This is your Care EveryWhere ID. This could be used by other organizations to access your [facility] medical records  [id number]        Equal Access to Services     OCTAVIA RUVALCABA AH: Hadii aad ku hadasho Soomaali, waaxda luqadaha, qaybta kaalmada adeegyada, waxay angelique fletcher. So [facility] [phone].    ATENCIÓN: Si habla español, tiene a regalado disposición servicios gratuitos de asistencia lingüística. Llame al [phone].    We comply with applicable federal civil rights laws and [place] laws. We do not discriminate on the basis of race, color, national origin, age, disability, sex, sexual orientation, or gender identity.

## 2018-11-13 ENCOUNTER — OFFICE VISIT (OUTPATIENT)
Dept: PSYCHOLOGY | Facility: CLINIC | Age: 28
End: 2018-11-13
Payer: COMMERCIAL

## 2018-11-13 DIAGNOSIS — F32.2 MAJOR DEPRESSIVE DISORDER, SINGLE EPISODE, SEVERE WITH ANXIOUS DISTRESS (H): ICD-10-CM

## 2018-11-13 DIAGNOSIS — F41.1 GENERALIZED ANXIETY DISORDER: Primary | ICD-10-CM

## 2018-11-13 PROCEDURE — 90834 PSYTX W PT 45 MINUTES: CPT | Performed by: SOCIAL WORKER

## 2018-11-13 NOTE — MR AVS SNAPSHOT
MRN:[id number]                      After Visit Summary   11/13/2018    Marvin Alves    MRN: [id number]           Visit Information        Provider Department      11/13/2018 3:30 PM Elissa Mckeon, [facility] Generic      Your next 10 appointments already scheduled     Dec 06, 2018  9:30 AM CST   Return Visit with Elissa LONG Venkatesh Mckeon [facility] ([facility])    [address] [place] [phone]   [phone]            Dec 21, 2018  3:30 PM CST   Return Visit with Elissa LONG Venkatesh Mckeon [facility] ([facility])    [address] [place] [phone]   [phone]              Care EveryWhere ID     This is your Care EveryWhere ID. This could be used by other organizations to access your [facility] medical records  [id number]        Equal Access to Services     OCTAVIA RUVALCABA AH: Hadii aad ku hadasho Soomaali, waaxda luqadaha, qaybta kaalmada adeegyada, waxay angelique fletcher. So [facility] [phone].    ATENCIÓN: Si habla español, tiene a regalado disposición servicios gratuitos de asistencia lingüística. Llame al [phone].    We comply with applicable federal civil rights laws and [place] laws. We do not discriminate on the basis of race, color, national origin, age, disability, sex, sexual orientation, or gender identity.

## 2018-11-14 NOTE — PROGRESS NOTES
Progress Note    Client Name: Marvin Alves  Date: 11/13/2018         Service Type: Individual      Session Start Time: 3:40 PM  Session End Time: 4:30 PM      Session Length: 50 Minutes     Session #: 14     Attendees: Client    Treatment Plan Last Reviewed: 8/16/2018  PHQ-9 / KWASI-7 : 25/21     DATA      Progress Since Last Session (Related to Symptoms / Goals / Homework):   Symptoms: Improving : The client reported experiencing decreased anxiety, increased motivation and increased energy over the past week and a half.     Homework: Achieved / completed to satisfaction. The client reported that he has been using bilateral stimulation with sound to reduce his anxiety. He indicated that he has not experienced any neck pain since his last individual therapy session.       Episode of Care Goals: Satisfactory progress - ACTION (Actively working towards change); Intervened by reinforcing change plan / affirming steps taken.     Current / Ongoing Stressors and Concerns:   The client reported that his stress has decreased. He also indicated that he has been jogging outside regularly.      Treatment Objective(s) Addressed in This Session:   The client will use relaxation strategies two times per day to reduce the physical symptoms of anxiety.      Intervention:   Discussed and reinforced the client's efforts to use bilateral stimulation to reduce his anxiety. Talked about his plan to create his own bilateral stimulation using sound. Discussed and reinforced the client's efforts to get regular exercise by jogging. Obtained a commitment from the client to continue jogging regularly.        ASSESSMENT: Current Emotional / Mental Status (status of significant symptoms):   Risk status (Self / Other harm or suicidal ideation)   Client denies current fears or concerns for personal safety.   Client denies current or recent suicidal ideation or behaviors.   Client denies current or  recent homicidal ideation or behaviors.   Client denies current or recent self injurious behavior or ideation.   Client denies other safety concerns.   A safety and risk management plan has not been developed at this time, however client was given the after-hours number / 911 should there be a change in any of these risk factors.     Appearance:   Appropriate    Eye Contact:   Good    Psychomotor Behavior: Normal    Attitude:   Cooperative    Orientation:   All   Speech    Rate / Production: Normal     Volume:  Normal    Mood:    Normal   Affect:    Appropriate    Thought Content:  Clear    Thought Form:  Coherent  Logical    Insight:    Good      Medication Review:   No current psychiatric medications prescribed.     Medication Compliance:   N/A     Changes in Health Issues:   None reported.     Chemical Use Review:   Substance Use: Chemical use reviewed. No active concerns identified.      Tobacco Use: No change in amount of tobacco use since last session.  Patient declined discussion at this time.     Collateral Reports Completed:   Not Applicable    PLAN: (Client Tasks / Therapist Tasks / Other)  Create his own bilateral stimulation using sound. Continue jogging regularly.        MERCEDES Velasco.S.NEO., GABINOSPETR.                                                         ________________________________________________________________________    Treatment Plan    Client's Name: Marvin Alves  YOB: 1990    Date: 8/16/2018    DSM-V Diagnoses:     296.23 (F32.2) Major Depressive Disorder, Single Episode, Severe With Anxious Distress  300.02 (F41.1) Generalized Anxiety Disorder    Psychosocial / Contextual Factors: The client is a [age] year old, , single male who resides with his mother, his father and his older brother in a home in [place], [place]. The client is currently unemployed. The client reported that he began experiencing symptoms of anxiety approximately two years ago. He  "indicated that he began experiencing symptoms of depression shortly thereafter. The client reported the reason for seeking therapy at this time is his lack of interest or ability to function in life. He indicated that he is not employed.    WHODAS: 52    Referral / Collaboration:  Referral to another professional/service is not indicated at this time.    Anticipated number of session or this episode of care: 12      MeasurableTreatment Goal(s) related to diagnosis / functional impairment(s)  Goal 1: The client will learn and apply skills to manage the symptoms of anxiety that he has been experiencing.    I will know I've met my goal when I can relax.      Objective #A     The client will identify specific fears / thoughts that contribute to feeling anxious.  Status: Continued - Date(s): 8/16/2018     Intervention(s)  Therapist will assist the client in identifying specific fears/thoughts that contribute to feeling anxious.    Objective #B  The client will use  worry time  each day for 30 minutes of scheduled worry and then defer obsessive or anxious thinking until the next structured worry time.  Status: Continued - Date(s): 8/16/2018     Intervention(s)  Therapist will teach and support the client in learning and using \"worry time\".    Objective #C  The client will use relaxation strategies two times per day to reduce the physical symptoms of anxiety.  Status: Continued - Date(s): 8/16/2018     Intervention(s)  Therapist will teach and support the client in learning and using relaxation strategies.    Objective #D  The client will use cognitive strategies identified in therapy to challenge anxious thoughts.    Status: Continued - Date(s): 8/16/2018     Intervention(s)  Therapist will teach and support the client in using cognitive strategies to challenge anxious thoughts.    Objective #E  The client will be mindful of the body sensations that he is experiencing when he is feeling anxious.  Status: Continued - " Date(s): 8/16/2018     Intervention(s)  Therapist will  and support the client in being mindful of the body sensations that he is experiencing when he is feeling anxious.    Objective #F  The client will participate in EMDR treatment to reprocess traumatic memories.  Status: Continued - Date(s): 8/16/2018     Intervention(s)  Therapist will utilize EMDR treatment to facilitate the client's reprocessing of traumatic memories.      The client has reviewed and agreed to the above plan.      Elissa Mckeon M.S.W., L.I.C.S.W.  August 16, 2018

## 2018-12-21 ENCOUNTER — OFFICE VISIT (OUTPATIENT)
Dept: PSYCHOLOGY | Facility: CLINIC | Age: 28
End: 2018-12-21
Payer: COMMERCIAL

## 2018-12-21 DIAGNOSIS — F32.2 MAJOR DEPRESSIVE DISORDER, SINGLE EPISODE, SEVERE WITH ANXIOUS DISTRESS (H): ICD-10-CM

## 2018-12-21 DIAGNOSIS — F41.1 GENERALIZED ANXIETY DISORDER: Primary | ICD-10-CM

## 2018-12-21 PROCEDURE — 90834 PSYTX W PT 45 MINUTES: CPT | Performed by: SOCIAL WORKER

## 2018-12-22 NOTE — PROGRESS NOTES
Progress Note    Client Name: Marvin Alves  Date: 12/21/2018         Service Type: Individual      Session Start Time: 3:45 PM  Session End Time: 4:30 PM      Session Length: 45 Minutes     Session #: 15     Attendees: Client    Treatment Plan Last Reviewed: 12/21/2018  PHQ-9 / KWASI-7 : 25/21     DATA      Progress Since Last Session (Related to Symptoms / Goals / Homework):   Symptoms: Improving : The client reported continuing to experience decreased anxiety, increased motivation and increased energy over the past month.     Homework: Partially completed. The client reported that he has not created his own bilateral stimulation with sound. He stated that he has been jogging daily.      Episode of Care Goals: Satisfactory progress - ACTION (Actively working towards change); Intervened by reinforcing change plan / affirming steps taken.     Current / Ongoing Stressors and Concerns:   The client reported that his stress continues to be low.      Treatment Objective(s) Addressed in This Session:   The client will use relaxation strategies two times per day to reduce the physical symptoms of anxiety.      Intervention:   Examined the client's continued improved mood and decreased anxiety. Reinforced the client's efforts to continue jogging daily. Examined the barriers to creating his own bilateral stimulation, Reviewed and revised the individual treatment plan.        ASSESSMENT: Current Emotional / Mental Status (status of significant symptoms):   Risk status (Self / Other harm or suicidal ideation)   Client denies current fears or concerns for personal safety.   Client denies current or recent suicidal ideation or behaviors.   Client denies current or recent homicidal ideation or behaviors.   Client denies current or recent self injurious behavior or ideation.   Client denies other safety concerns.   A safety and risk management plan has not been developed at this time,  however client was given the after-hours number / 911 should there be a change in any of these risk factors.     Appearance:   Appropriate    Eye Contact:   Good    Psychomotor Behavior: Normal    Attitude:   Cooperative    Orientation:   All   Speech    Rate / Production: Normal     Volume:  Normal    Mood:    Normal   Affect:    Appropriate    Thought Content:  Clear    Thought Form:  Coherent  Logical    Insight:    Good      Medication Review:   No current psychiatric medications prescribed.     Medication Compliance:   N/A     Changes in Health Issues:   None reported.     Chemical Use Review:   Substance Use: Chemical use reviewed. No active concerns identified.      Tobacco Use: No change in amount of tobacco use since last session.  Patient declined discussion at this time.     Collateral Reports Completed:   Not Applicable    PLAN: (Client Tasks / Therapist Tasks / Other)  Continue jogging regularly.        Elissa Mckeon M.S.NEO., LYNN.NEO.                                                         ________________________________________________________________________    Treatment Plan    Client's Name: Marvin Alves  YOB: 1990    Date: 12/21/2018    DSM-V Diagnoses:     296.23 (F32.2) Major Depressive Disorder, Single Episode, Severe With Anxious Distress  300.02 (F41.1) Generalized Anxiety Disorder    Psychosocial / Contextual Factors: The client is a [age] year old, , single male who resides with his mother, his father and his older brother in a home in [place], [place]. The client is currently unemployed. The client reported that he began experiencing symptoms of anxiety approximately two years ago. He indicated that he began experiencing symptoms of depression shortly thereafter. The client reported the reason for seeking therapy at this time is his lack of interest or ability to function in life. He indicated that he is not employed.    WHODAS: 52    Referral /  "Collaboration:  Referral to another professional/service is not indicated at this time.    Anticipated number of session or this episode of care: 12      MeasurableTreatment Goal(s) related to diagnosis / functional impairment(s)  Goal 1: The client will learn and apply skills to manage the symptoms of anxiety that he has been experiencing.    I will know I've met my goal when I can relax.      Objective #A     The client will identify specific fears / thoughts that contribute to feeling anxious.  Status: Continued - Date(s): 12/21/2018     Intervention(s)  Therapist will assist the client in identifying specific fears/thoughts that contribute to feeling anxious.    Objective #B  The client will use  worry time  each day for 30 minutes of scheduled worry and then defer obsessive or anxious thinking until the next structured worry time.  Status: Continued - Date(s): 12/21/2018     Intervention(s)  Therapist will teach and support the client in learning and using \"worry time\".    Objective #C  The client will use relaxation strategies two times per day to reduce the physical symptoms of anxiety.  Status: Continued - Date(s): 12/21/2018     Intervention(s)  Therapist will teach and support the client in learning and using relaxation strategies.    Objective #D  The client will use cognitive strategies identified in therapy to challenge anxious thoughts.    Status: Continued - Date(s): 12/21/2018     Intervention(s)  Therapist will teach and support the client in using cognitive strategies to challenge anxious thoughts.    Objective #E  The client will be mindful of the body sensations that he is experiencing when he is feeling anxious.  Status: Continued - Date(s): 12/21/2018     Intervention(s)  Therapist will  and support the client in being mindful of the body sensations that he is experiencing when he is feeling anxious.      The client has reviewed and agreed to the above plan.      Elissa Mckeon M.S.W., " ROSALINEW.  December 21, 2018

## 2019-03-22 ENCOUNTER — OFFICE VISIT (OUTPATIENT)
Dept: PSYCHOLOGY | Facility: CLINIC | Age: 29
End: 2019-03-22
Payer: COMMERCIAL

## 2019-03-22 DIAGNOSIS — F32.2 MAJOR DEPRESSIVE DISORDER, SINGLE EPISODE, SEVERE WITH ANXIOUS DISTRESS (H): ICD-10-CM

## 2019-03-22 DIAGNOSIS — F41.1 GENERALIZED ANXIETY DISORDER: Primary | ICD-10-CM

## 2019-03-22 PROCEDURE — 90834 PSYTX W PT 45 MINUTES: CPT | Performed by: SOCIAL WORKER

## 2019-03-23 NOTE — PROGRESS NOTES
Progress Note    Client Name: Marvin Alves  Date: 3/22/2019         Service Type: Individual      Session Start Time: 2:45 PM  Session End Time: 3:30 PM      Session Length: 45 Minutes     Session #: 16     Attendees: Client    Treatment Plan Last Reviewed: 3/22/2019  PHQ-9 / KWASI-7 : 25/21     DATA      Progress Since Last Session (Related to Symptoms / Goals / Homework):   Symptoms: Worsening : The client reported that he has been experiencing decreased motivation and decreased energy over the past two weeks.    Homework: Achieved / completed to satisfaction. The client reported that he has been jogging regularly.      Episode of Care Goals: Minimal progress - ACTION (Actively working towards change); Intervened by reinforcing change plan / affirming steps taken.     Current / Ongoing Stressors and Concerns:   The client reported that his stress continues to be low.      Treatment Objective(s) Addressed in This Session:   The client will learn and apply skills to manage the symptoms of anxiety that he has been experiencing.      Intervention:   Discussed the client's past three months. Reinforced the client's efforts to continue jogging regularly. Obtained a commitment to continue jogging regularly. Created a plan to begin EMDR treatment. Reviewed and revised the individual treatment plan.        ASSESSMENT: Current Emotional / Mental Status (status of significant symptoms):   Risk status (Self / Other harm or suicidal ideation)   Client denies current fears or concerns for personal safety.   Client denies current or recent suicidal ideation or behaviors.   Client denies current or recent homicidal ideation or behaviors.   Client denies current or recent self injurious behavior or ideation.   Client denies other safety concerns.   A safety and risk management plan has not been developed at this time, however client was given the after-hours number / 911 should there be  a change in any of these risk factors.     Appearance:   Appropriate    Eye Contact:   Good    Psychomotor Behavior: Normal    Attitude:   Cooperative    Orientation:   All   Speech    Rate / Production: Normal     Volume:  Normal    Mood:    Anxious    Affect:    Appropriate    Thought Content:  Clear    Thought Form:  Coherent  Logical    Insight:    Good      Medication Review:   No current psychiatric medications prescribed.     Medication Compliance:   N/A     Changes in Health Issues:   None reported.     Chemical Use Review:   Substance Use: Chemical use reviewed. No active concerns identified.      Tobacco Use: No change in amount of tobacco use since last session.  Patient declined discussion at this time.     Collateral Reports Completed:   Not Applicable    PLAN: (Client Tasks / Therapist Tasks / Other)  Continue jogging regularly.        MERCEDES Velasco.SPETR., MILO.                                                         ________________________________________________________________________    Treatment Plan    Client's Name: Marvin Alves  YOB: 1990    Date: 3/22/2019    DSM-V Diagnoses:     296.23 (F32.2) Major Depressive Disorder, Single Episode, Severe With Anxious Distress  300.02 (F41.1) Generalized Anxiety Disorder    Psychosocial / Contextual Factors: The client is a [age] year old, , single male who resides with his mother, his father and his older brother in a home in [place], [place]. The client is currently unemployed. The client reported that he began experiencing symptoms of anxiety approximately three years ago. He indicated that he began experiencing symptoms of depression shortly thereafter. The client reported the reason for seeking therapy at this time is his lack of interest or ability to function in life. He indicated that he is not employed.    WHODAS: 52    Referral / Collaboration:  Referral to another professional/service is not indicated  "at this time.    Anticipated number of session or this episode of care: 12      MeasurableTreatment Goal(s) related to diagnosis / functional impairment(s)  Goal 1: The client will learn and apply skills to manage the symptoms of anxiety that he has been experiencing.    I will know I've met my goal when I can relax.      Objective #A     The client will identify specific fears / thoughts that contribute to feeling anxious.  Status: Continued - Date(s): 3/22/2019     Intervention(s)  Therapist will assist the client in identifying specific fears/thoughts that contribute to feeling anxious.    Objective #B  The client will use  worry time  each day for 30 minutes of scheduled worry and then defer obsessive or anxious thinking until the next structured \"worry time\".  Status: Continued - Date(s): 3/22/2019     Intervention(s)  Therapist will teach and support the client in learning and using \"worry time\".    Objective #C  The client will use relaxation strategies two times per day to reduce the physical symptoms of anxiety.  Status: Continued - Date(s): 3/22/2019     Intervention(s)  Therapist will teach and support the client in learning and using relaxation strategies.    Objective #D  The client will use cognitive strategies identified in therapy to challenge anxious thoughts.    Status: Continued - Date(s): 3/22/2019     Intervention(s)  Therapist will teach and support the client in using cognitive strategies to challenge anxious thoughts.    Objective #E  The client will be mindful of the body sensations that he is experiencing when he is feeling anxious.  Status: Continued - Date(s): 3/22/2019     Intervention(s)  Therapist will  and support the client in being mindful of the body sensations that he is experiencing when he is feeling anxious.    Objective #F  The client will participate in EMDR treatment to reprocess traumatic memories.  Status: New - Date: 3/22/2019      Intervention(s)  Therapist will " utilize EMDR treatment to facilitate the client's reprocessing of traumatic memories.      The client has reviewed and agreed to the above plan.      MERCEDES Velasco.S.NEO., L.I.C.S.W.  March 22, 2019

## 2019-04-05 ENCOUNTER — OFFICE VISIT (OUTPATIENT)
Dept: PSYCHOLOGY | Facility: CLINIC | Age: 29
End: 2019-04-05
Payer: COMMERCIAL

## 2019-04-05 DIAGNOSIS — F41.1 GENERALIZED ANXIETY DISORDER: Primary | ICD-10-CM

## 2019-04-05 DIAGNOSIS — F32.2 MAJOR DEPRESSIVE DISORDER, SINGLE EPISODE, SEVERE WITH ANXIOUS DISTRESS (H): ICD-10-CM

## 2019-04-05 PROCEDURE — 90834 PSYTX W PT 45 MINUTES: CPT | Performed by: SOCIAL WORKER

## 2019-04-06 NOTE — PROGRESS NOTES
Progress Note    Client Name: Marvin Alves  Date: 4/5/2019         Service Type: Individual      Session Start Time: 2:45 PM  Session End Time: 3:30 PM      Session Length: 45 Minutes     Session #: 17     Attendees: Client    Treatment Plan Last Reviewed: 3/22/2019  PHQ-9 / KWASI-7 : 25/21     DATA      Progress Since Last Session (Related to Symptoms / Goals / Homework):   Symptoms: Improving : The client reported experienced increased rancho and increased motivation over the past two weeks.    Homework: Achieved / completed to satisfaction. The client reported that he continues to jog regularly.      Episode of Care Goals: Satisfactory progress - ACTION (Actively working towards change); Intervened by reinforcing change plan / affirming steps taken.     Current / Ongoing Stressors and Concerns:   The client reported that his stress continues to be low.      Treatment Objective(s) Addressed in This Session:   The client will participate in EMDR treatment to reprocess traumatic memories.      Intervention:   EMDR: Oriented the client to EMDR treatment. Completed phase one of EMDR treatment. Obtained a commitment from the client to continue jogging regularly.        ASSESSMENT: Current Emotional / Mental Status (status of significant symptoms):   Risk status (Self / Other harm or suicidal ideation)   Client denies current fears or concerns for personal safety.   Client denies current or recent suicidal ideation or behaviors.   Client denies current or recent homicidal ideation or behaviors.   Client denies current or recent self injurious behavior or ideation.   Client denies other safety concerns.   A safety and risk management plan has not been developed at this time, however client was given the after-hours number / 911 should there be a change in any of these risk factors.     Appearance:   Appropriate    Eye Contact:   Good    Psychomotor Behavior: Normal     Attitude:   Cooperative    Orientation:   All   Speech    Rate / Production: Normal     Volume:  Normal    Mood:    Anxious    Affect:    Appropriate    Thought Content:  Clear    Thought Form:  Coherent  Logical    Insight:    Good      Medication Review:   No current psychiatric medications prescribed.     Medication Compliance:   N/A     Changes in Health Issues:   None reported.     Chemical Use Review:   Substance Use: Chemical use reviewed. No active concerns identified.      Tobacco Use: No change in amount of tobacco use since last session.  Patient declined discussion at this time.     Collateral Reports Completed:   Not Applicable    PLAN: (Client Tasks / Therapist Tasks / Other)  Continue jogging regularly.        NASIR VelascoSAMOS, MILO.                                                         ________________________________________________________________________    Treatment Plan    Client's Name: Marvin Alves  YOB: 1990    Date: 3/22/2019    DSM-V Diagnoses:     296.23 (F32.2) Major Depressive Disorder, Single Episode, Severe With Anxious Distress  300.02 (F41.1) Generalized Anxiety Disorder    Psychosocial / Contextual Factors: The client is a [age] year old, , single male who resides with his mother, his father and his older brother in a home in [place], [place]. The client is currently unemployed. The client reported that he began experiencing symptoms of anxiety approximately three years ago. He indicated that he began experiencing symptoms of depression shortly thereafter. The client reported the reason for seeking therapy at this time is his lack of interest or ability to function in life. He indicated that he is not employed.    WHODAS: 52    Referral / Collaboration:  Referral to another professional/service is not indicated at this time.    Anticipated number of session or this episode of care: 12      MeasurableTreatment Goal(s) related to  "diagnosis / functional impairment(s)  Goal 1: The client will learn and apply skills to manage the symptoms of anxiety that he has been experiencing.    I will know I've met my goal when I can relax.      Objective #A     The client will identify specific fears / thoughts that contribute to feeling anxious.  Status: Continued - Date(s): 3/22/2019     Intervention(s)  Therapist will assist the client in identifying specific fears/thoughts that contribute to feeling anxious.    Objective #B  The client will use  worry time  each day for 30 minutes of scheduled worry and then defer obsessive or anxious thinking until the next structured \"worry time\".  Status: Continued - Date(s): 3/22/2019     Intervention(s)  Therapist will teach and support the client in learning and using \"worry time\".    Objective #C  The client will use relaxation strategies two times per day to reduce the physical symptoms of anxiety.  Status: Continued - Date(s): 3/22/2019     Intervention(s)  Therapist will teach and support the client in learning and using relaxation strategies.    Objective #D  The client will use cognitive strategies identified in therapy to challenge anxious thoughts.    Status: Continued - Date(s): 3/22/2019     Intervention(s)  Therapist will teach and support the client in using cognitive strategies to challenge anxious thoughts.    Objective #E  The client will be mindful of the body sensations that he is experiencing when he is feeling anxious.  Status: Continued - Date(s): 3/22/2019     Intervention(s)  Therapist will  and support the client in being mindful of the body sensations that he is experiencing when he is feeling anxious.    Objective #F  The client will participate in EMDR treatment to reprocess traumatic memories.  Status: New - Date: 3/22/2019      Intervention(s)  Therapist will utilize EMDR treatment to facilitate the client's reprocessing of traumatic memories.      The client has reviewed and " agreed to the above plan.      Elissa Mckeon M.S.NEO., HOWARD.[email or web address].  March 22, 2019

## 2019-04-19 ENCOUNTER — OFFICE VISIT (OUTPATIENT)
Dept: PSYCHOLOGY | Facility: CLINIC | Age: 29
End: 2019-04-19
Payer: COMMERCIAL

## 2019-04-19 DIAGNOSIS — F32.2 MAJOR DEPRESSIVE DISORDER, SINGLE EPISODE, SEVERE WITH ANXIOUS DISTRESS (H): ICD-10-CM

## 2019-04-19 DIAGNOSIS — F41.1 GENERALIZED ANXIETY DISORDER: Primary | ICD-10-CM

## 2019-04-19 PROCEDURE — 90832 PSYTX W PT 30 MINUTES: CPT | Performed by: SOCIAL WORKER

## 2019-04-20 NOTE — PROGRESS NOTES
"                                             Progress Note    Client Name: Marvin Alves  Date: 4/19/2019         Service Type: Individual      Session Start Time: 12:55 PM  Session End Time: 1:30 PM      Session Length: 35 Minutes     Session #: 18     Attendees: Client    Treatment Plan Last Reviewed: 3/22/2019  PHQ-9 / KWASI-7 : 25/21     DATA      Progress Since Last Session (Related to Symptoms / Goals / Homework):   Symptoms: Improving : The client reported experiencing continued increased rancho and increased motivation over the past two weeks.    Homework: Achieved / completed to satisfaction. The client reported that he continues to jog regularly.      Episode of Care Goals: Satisfactory progress - ACTION (Actively working towards change); Intervened by reinforcing change plan / affirming steps taken.     Current / Ongoing Stressors and Concerns:   The client reported that his stress continues to be low.      Treatment Objective(s) Addressed in This Session:   The client will participate in EMDR treatment to reprocess traumatic memories.      Intervention:   EMDR: Completed phase two of EMDR treatment. Obtained a commitment from the client to use the \"calm place\" imagery daily.        ASSESSMENT: Current Emotional / Mental Status (status of significant symptoms):   Risk status (Self / Other harm or suicidal ideation)   Client denies current fears or concerns for personal safety.   Client denies current or recent suicidal ideation or behaviors.   Client denies current or recent homicidal ideation or behaviors.   Client denies current or recent self injurious behavior or ideation.   Client denies other safety concerns.   A safety and risk management plan has not been developed at this time, however client was given the after-hours number / 911 should there be a change in any of these risk factors.     Appearance:   Appropriate    Eye Contact:   Good    Psychomotor Behavior: Normal    Attitude:   Cooperative " "   Orientation:   All   Speech    Rate / Production: Normal     Volume:  Normal    Mood:    Anxious    Affect:    Appropriate    Thought Content:  Clear    Thought Form:  Coherent  Logical    Insight:    Good      Medication Review:   No current psychiatric medications prescribed.     Medication Compliance:   N/A     Changes in Health Issues:   None reported.     Chemical Use Review:   Substance Use: Chemical use reviewed. No active concerns identified.      Tobacco Use: No change in amount of tobacco use since last session.  Patient declined discussion at this time.     Collateral Reports Completed:   Not Applicable    PLAN: (Client Tasks / Therapist Tasks / Other)  Use the \"calm place\" imagery daily.        SALAAZR Velasco, MILO.                                                         ________________________________________________________________________    Treatment Plan    Client's Name: Marvin Alves  YOB: 1990    Date: 3/22/2019    DSM-V Diagnoses:     296.23 (F32.2) Major Depressive Disorder, Single Episode, Severe With Anxious Distress  300.02 (F41.1) Generalized Anxiety Disorder    Psychosocial / Contextual Factors: The client is a [age] year old, , single male who resides with his mother, his father and his older brother in a home in [place], [place]. The client is currently unemployed. The client reported that he began experiencing symptoms of anxiety approximately three years ago. He indicated that he began experiencing symptoms of depression shortly thereafter. The client reported the reason for seeking therapy at this time is his lack of interest or ability to function in life. He indicated that he is not employed.    WHODAS: 52    Referral / Collaboration:  Referral to another professional/service is not indicated at this time.    Anticipated number of session or this episode of care: 12      MeasurableTreatment Goal(s) related to diagnosis / functional " "impairment(s)  Goal 1: The client will learn and apply skills to manage the symptoms of anxiety that he has been experiencing.    I will know I've met my goal when I can relax.      Objective #A     The client will identify specific fears / thoughts that contribute to feeling anxious.  Status: Continued - Date(s): 3/22/2019     Intervention(s)  Therapist will assist the client in identifying specific fears/thoughts that contribute to feeling anxious.    Objective #B  The client will use  worry time  each day for 30 minutes of scheduled worry and then defer obsessive or anxious thinking until the next structured \"worry time\".  Status: Continued - Date(s): 3/22/2019     Intervention(s)  Therapist will teach and support the client in learning and using \"worry time\".    Objective #C  The client will use relaxation strategies two times per day to reduce the physical symptoms of anxiety.  Status: Continued - Date(s): 3/22/2019     Intervention(s)  Therapist will teach and support the client in learning and using relaxation strategies.    Objective #D  The client will use cognitive strategies identified in therapy to challenge anxious thoughts.    Status: Continued - Date(s): 3/22/2019     Intervention(s)  Therapist will teach and support the client in using cognitive strategies to challenge anxious thoughts.    Objective #E  The client will be mindful of the body sensations that he is experiencing when he is feeling anxious.  Status: Continued - Date(s): 3/22/2019     Intervention(s)  Therapist will  and support the client in being mindful of the body sensations that he is experiencing when he is feeling anxious.    Objective #F  The client will participate in EMDR treatment to reprocess traumatic memories.  Status: New - Date: 3/22/2019      Intervention(s)  Therapist will utilize EMDR treatment to facilitate the client's reprocessing of traumatic memories.      The client has reviewed and agreed to the above " plan.      Elissa Mckeon M.S.NEO., L.KASSANDRA.C.S.W.  March 22, 2019

## 2019-05-03 ENCOUNTER — OFFICE VISIT (OUTPATIENT)
Dept: PSYCHOLOGY | Facility: CLINIC | Age: 29
End: 2019-05-03
Payer: COMMERCIAL

## 2019-05-03 DIAGNOSIS — F32.2 MAJOR DEPRESSIVE DISORDER, SINGLE EPISODE, SEVERE WITH ANXIOUS DISTRESS (H): ICD-10-CM

## 2019-05-03 DIAGNOSIS — F41.1 GENERALIZED ANXIETY DISORDER: Primary | ICD-10-CM

## 2019-05-03 PROCEDURE — 90834 PSYTX W PT 45 MINUTES: CPT | Performed by: SOCIAL WORKER

## 2019-05-04 NOTE — PROGRESS NOTES
"                                             Progress Note    Client Name: Marvin Alves  Date: 5/3/2019         Service Type: Individual      Session Start Time: 12:45 PM  Session End Time: 1:30 PM      Session Length: 45 Minutes     Session #: 19     Attendees: Client    Treatment Plan Last Reviewed: 3/22/2019  PHQ-9 / KWASI-7 : 25/21     DATA      Progress Since Last Session (Related to Symptoms / Goals / Homework):   Symptoms: Improving : The client reported experiencing continued increased rancho and increased motivation over the past two weeks.    Homework: Achieved / completed to satisfaction. The client reported that he continues to jog regularly.      Episode of Care Goals: Satisfactory progress - ACTION (Actively working towards change); Intervened by reinforcing change plan / affirming steps taken.     Current / Ongoing Stressors and Concerns:   The client reported that his stress continues to be low.      Treatment Objective(s) Addressed in This Session:   The client will participate in EMDR treatment to reprocess traumatic memories.      Intervention:   EMDR: Completed phase three of EMDR treatment. Obtained a commitment from the client to use the \"calm place\" imagery daily.        ASSESSMENT: Current Emotional / Mental Status (status of significant symptoms):   Risk status (Self / Other harm or suicidal ideation)   Client denies current fears or concerns for personal safety.   Client denies current or recent suicidal ideation or behaviors.   Client denies current or recent homicidal ideation or behaviors.   Client denies current or recent self injurious behavior or ideation.   Client denies other safety concerns.   A safety and risk management plan has not been developed at this time, however client was given the after-hours number / 911 should there be a change in any of these risk factors.     Appearance:   Appropriate    Eye Contact:   Good    Psychomotor Behavior: Normal    Attitude:   Cooperative " "   Orientation:   All   Speech    Rate / Production: Normal     Volume:  Normal    Mood:    Anxious    Affect:    Appropriate    Thought Content:  Clear    Thought Form:  Coherent  Logical    Insight:    Good      Medication Review:   No current psychiatric medications prescribed.     Medication Compliance:   N/A     Changes in Health Issues:   None reported.     Chemical Use Review:   Substance Use: Chemical use reviewed. No active concerns identified.      Tobacco Use: No change in amount of tobacco use since last session.  Patient declined discussion at this time.     Collateral Reports Completed:   Not Applicable    PLAN: (Client Tasks / Therapist Tasks / Other)  Use the \"calm place\" imagery daily.        SALAZAR Velasco, MILO.                                                         ________________________________________________________________________    Treatment Plan    Client's Name: Marvin Alves  YOB: 1990    Date: 3/22/2019    DSM-V Diagnoses:     296.23 (F32.2) Major Depressive Disorder, Single Episode, Severe With Anxious Distress  300.02 (F41.1) Generalized Anxiety Disorder    Psychosocial / Contextual Factors: The client is a [age] year old, , single male who resides with his mother, his father and his older brother in a home in [place], [place]. The client is currently unemployed. The client reported that he began experiencing symptoms of anxiety approximately three years ago. He indicated that he began experiencing symptoms of depression shortly thereafter. The client reported the reason for seeking therapy at this time is his lack of interest or ability to function in life. He indicated that he is not employed.    WHODAS: 52    Referral / Collaboration:  Referral to another professional/service is not indicated at this time.    Anticipated number of session or this episode of care: 12      MeasurableTreatment Goal(s) related to diagnosis / functional " "impairment(s)  Goal 1: The client will learn and apply skills to manage the symptoms of anxiety that he has been experiencing.    I will know I've met my goal when I can relax.      Objective #A     The client will identify specific fears / thoughts that contribute to feeling anxious.  Status: Continued - Date(s): 3/22/2019     Intervention(s)  Therapist will assist the client in identifying specific fears/thoughts that contribute to feeling anxious.    Objective #B  The client will use  worry time  each day for 30 minutes of scheduled worry and then defer obsessive or anxious thinking until the next structured \"worry time\".  Status: Continued - Date(s): 3/22/2019     Intervention(s)  Therapist will teach and support the client in learning and using \"worry time\".    Objective #C  The client will use relaxation strategies two times per day to reduce the physical symptoms of anxiety.  Status: Continued - Date(s): 3/22/2019     Intervention(s)  Therapist will teach and support the client in learning and using relaxation strategies.    Objective #D  The client will use cognitive strategies identified in therapy to challenge anxious thoughts.    Status: Continued - Date(s): 3/22/2019     Intervention(s)  Therapist will teach and support the client in using cognitive strategies to challenge anxious thoughts.    Objective #E  The client will be mindful of the body sensations that he is experiencing when he is feeling anxious.  Status: Continued - Date(s): 3/22/2019     Intervention(s)  Therapist will  and support the client in being mindful of the body sensations that he is experiencing when he is feeling anxious.    Objective #F  The client will participate in EMDR treatment to reprocess traumatic memories.  Status: New - Date: 3/22/2019      Intervention(s)  Therapist will utilize EMDR treatment to facilitate the client's reprocessing of traumatic memories.      The client has reviewed and agreed to the above " plan.      Elissa Mckeon M.S.NEO., L.KASSANDRA.C.S.W.  March 22, 2019

## 2019-05-20 ENCOUNTER — OFFICE VISIT (OUTPATIENT)
Dept: PSYCHOLOGY | Facility: CLINIC | Age: 29
End: 2019-05-20
Payer: COMMERCIAL

## 2019-05-20 DIAGNOSIS — F41.1 GENERALIZED ANXIETY DISORDER: Primary | ICD-10-CM

## 2019-05-20 DIAGNOSIS — F32.2 MAJOR DEPRESSIVE DISORDER, SINGLE EPISODE, SEVERE WITH ANXIOUS DISTRESS (H): ICD-10-CM

## 2019-05-20 PROCEDURE — 90832 PSYTX W PT 30 MINUTES: CPT | Performed by: SOCIAL WORKER

## 2019-05-20 NOTE — PROGRESS NOTES
"                                             Progress Note    Client Name: Marvin Alves  Date: 5/20/2019         Service Type: Individual      Session Start Time: 3:30 PM  Session End Time: 4:00 PM      Session Length: 30 Minutes     Session #: 20     Attendees: Client    Treatment Plan Last Reviewed: 3/22/2019  PHQ-9 / KWASI-7 : 25/21     DATA      Progress Since Last Session (Related to Symptoms / Goals / Homework):   Symptoms: Improving : The client reported experiencing continued increased rancho and increased motivation over the past two weeks.    Homework: Achieved / completed to satisfaction. The client reported that he continues to jog regularly.      Episode of Care Goals: Satisfactory progress - ACTION (Actively working towards change); Intervened by reinforcing change plan / affirming steps taken.     Current / Ongoing Stressors and Concerns:   The client reported that his stress continues to be low.      Treatment Objective(s) Addressed in This Session:   The client will participate in EMDR treatment to reprocess traumatic memories.      Intervention:   EMDR: Began phase four of EMDR treatment. Created a plan to continue phase four of EMDR treatment. Completed phase seven of EMDR treatment. Obtained a commitment from the client to use the \"calm place\" imagery daily.        ASSESSMENT: Current Emotional / Mental Status (status of significant symptoms):   Risk status (Self / Other harm or suicidal ideation)   Client denies current fears or concerns for personal safety.   Client denies current or recent suicidal ideation or behaviors.   Client denies current or recent homicidal ideation or behaviors.   Client denies current or recent self injurious behavior or ideation.   Client denies other safety concerns.   A safety and risk management plan has not been developed at this time, however client was given the after-hours number / 911 should there be a change in any of these risk " "factors.     Appearance:   Appropriate    Eye Contact:   Good    Psychomotor Behavior: Normal    Attitude:   Cooperative    Orientation:   All   Speech    Rate / Production: Normal     Volume:  Normal    Mood:    Anxious    Affect:    Appropriate    Thought Content:  Clear    Thought Form:  Coherent  Logical    Insight:    Good      Medication Review:   No current psychiatric medications prescribed.     Medication Compliance:   N/A     Changes in Health Issues:   None reported.     Chemical Use Review:   Substance Use: Chemical use reviewed. No active concerns identified.      Tobacco Use: No change in amount of tobacco use since last session.  Patient declined discussion at this time.     Collateral Reports Completed:   Not Applicable    PLAN: (Client Tasks / Therapist Tasks / Other)  Use the \"calm place\" imagery daily.        MERCEDES Velasco.SPETR., MILO.                                                         ________________________________________________________________________    Treatment Plan    Client's Name: Marvin Alves  YOB: 1990    Date: 3/22/2019    DSM-V Diagnoses:     296.23 (F32.2) Major Depressive Disorder, Single Episode, Severe With Anxious Distress  300.02 (F41.1) Generalized Anxiety Disorder    Psychosocial / Contextual Factors: The client is a [age] year old, , single male who resides with his mother, his father and his older brother in a home in [place], [place]. The client is currently unemployed. The client reported that he began experiencing symptoms of anxiety approximately three years ago. He indicated that he began experiencing symptoms of depression shortly thereafter. The client reported the reason for seeking therapy at this time is his lack of interest or ability to function in life. He indicated that he is not employed.    WHODAS: 52    Referral / Collaboration:  Referral to another professional/service is not indicated at this " "time.    Anticipated number of session or this episode of care: 12      MeasurableTreatment Goal(s) related to diagnosis / functional impairment(s)  Goal 1: The client will learn and apply skills to manage the symptoms of anxiety that he has been experiencing.    I will know I've met my goal when I can relax.      Objective #A     The client will identify specific fears / thoughts that contribute to feeling anxious.  Status: Continued - Date(s): 3/22/2019     Intervention(s)  Therapist will assist the client in identifying specific fears/thoughts that contribute to feeling anxious.    Objective #B  The client will use  worry time  each day for 30 minutes of scheduled worry and then defer obsessive or anxious thinking until the next structured \"worry time\".  Status: Continued - Date(s): 3/22/2019     Intervention(s)  Therapist will teach and support the client in learning and using \"worry time\".    Objective #C  The client will use relaxation strategies two times per day to reduce the physical symptoms of anxiety.  Status: Continued - Date(s): 3/22/2019     Intervention(s)  Therapist will teach and support the client in learning and using relaxation strategies.    Objective #D  The client will use cognitive strategies identified in therapy to challenge anxious thoughts.    Status: Continued - Date(s): 3/22/2019     Intervention(s)  Therapist will teach and support the client in using cognitive strategies to challenge anxious thoughts.    Objective #E  The client will be mindful of the body sensations that he is experiencing when he is feeling anxious.  Status: Continued - Date(s): 3/22/2019     Intervention(s)  Therapist will  and support the client in being mindful of the body sensations that he is experiencing when he is feeling anxious.    Objective #F  The client will participate in EMDR treatment to reprocess traumatic memories.  Status: New - Date: 3/22/2019      Intervention(s)  Therapist will utilize " EMDR treatment to facilitate the client's reprocessing of traumatic memories.      The client has reviewed and agreed to the above plan.      MERCEDES Velasco.S.NEO., L.I.C.S.W.  March 22, 2019

## 2019-06-04 ENCOUNTER — OFFICE VISIT (OUTPATIENT)
Dept: PSYCHOLOGY | Facility: CLINIC | Age: 29
End: 2019-06-04
Payer: COMMERCIAL

## 2019-06-04 DIAGNOSIS — F32.2 MAJOR DEPRESSIVE DISORDER, SINGLE EPISODE, SEVERE WITH ANXIOUS DISTRESS (H): ICD-10-CM

## 2019-06-04 DIAGNOSIS — F41.1 GENERALIZED ANXIETY DISORDER: Primary | ICD-10-CM

## 2019-06-04 PROCEDURE — 90834 PSYTX W PT 45 MINUTES: CPT | Performed by: SOCIAL WORKER

## 2019-06-05 NOTE — PROGRESS NOTES
"                                             Progress Note    Client Name: Marvin Alves  Date: 6/4/2019         Service Type: Individual      Session Start Time: 2:45 PM  Session End Time: 3:30 PM      Session Length: 45 Minutes     Session #: 21     Attendees: Client    Treatment Plan Last Reviewed: 3/22/2019  PHQ-9 / KWASI-7 : 25/21     DATA      Progress Since Last Session (Related to Symptoms / Goals / Homework):   Symptoms: Improving : The client reported experiencing continued increased rancho and increased motivation over the past two weeks.    Homework: Achieved / completed to satisfaction. The client reported that he has been using the \"calm place\" imagery daily.      Episode of Care Goals: Satisfactory progress - ACTION (Actively working towards change); Intervened by reinforcing change plan / affirming steps taken.     Current / Ongoing Stressors and Concerns:   The client reported that his stress continues to be low.      Treatment Objective(s) Addressed in This Session:   The client will participate in EMDR treatment to reprocess traumatic memories.      Intervention:   EMDR: Continued phase four of EMDR treatment. Created a plan to continue phase four of EMDR treatment. Completed phase seven of EMDR treatment. Obtained a commitment from the client to use the \"calm place\" imagery daily.        ASSESSMENT: Current Emotional / Mental Status (status of significant symptoms):   Risk status (Self / Other harm or suicidal ideation)   Client denies current fears or concerns for personal safety.   Client denies current or recent suicidal ideation or behaviors.   Client denies current or recent homicidal ideation or behaviors.   Client denies current or recent self injurious behavior or ideation.   Client denies other safety concerns.   A safety and risk management plan has not been developed at this time, however client was given the after-hours number / 911 should there be a change in any of these risk " "factors.     Appearance:   Appropriate    Eye Contact:   Good    Psychomotor Behavior: Normal    Attitude:   Cooperative    Orientation:   All   Speech    Rate / Production: Normal     Volume:  Normal    Mood:    Anxious    Affect:    Appropriate    Thought Content:  Clear    Thought Form:  Coherent  Logical    Insight:    Good      Medication Review:   No current psychiatric medications prescribed.     Medication Compliance:   N/A     Changes in Health Issues:   None reported.     Chemical Use Review:   Substance Use: Chemical use reviewed. No active concerns identified.      Tobacco Use: No change in amount of tobacco use since last session.  Patient declined discussion at this time.     Collateral Reports Completed:   Not Applicable    PLAN: (Client Tasks / Therapist Tasks / Other)  Use the \"calm place\" imagery daily.        MERCEDES Velasco.SPETR., MILO.                                                         ________________________________________________________________________    Treatment Plan    Client's Name: Marvin Alves  YOB: 1990    Date: 3/22/2019    DSM-V Diagnoses:     296.23 (F32.2) Major Depressive Disorder, Single Episode, Severe With Anxious Distress  300.02 (F41.1) Generalized Anxiety Disorder    Psychosocial / Contextual Factors: The client is a [age] year old, , single male who resides with his mother, his father and his older brother in a home in [place], [place]. The client is currently unemployed. The client reported that he began experiencing symptoms of anxiety approximately three years ago. He indicated that he began experiencing symptoms of depression shortly thereafter. The client reported the reason for seeking therapy at this time is his lack of interest or ability to function in life. He indicated that he is not employed.    WHODAS: 52    Referral / Collaboration:  Referral to another professional/service is not indicated at this " "time.    Anticipated number of session or this episode of care: 12      MeasurableTreatment Goal(s) related to diagnosis / functional impairment(s)  Goal 1: The client will learn and apply skills to manage the symptoms of anxiety that he has been experiencing.    I will know I've met my goal when I can relax.      Objective #A     The client will identify specific fears / thoughts that contribute to feeling anxious.  Status: Continued - Date(s): 3/22/2019     Intervention(s)  Therapist will assist the client in identifying specific fears/thoughts that contribute to feeling anxious.    Objective #B  The client will use  worry time  each day for 30 minutes of scheduled worry and then defer obsessive or anxious thinking until the next structured \"worry time\".  Status: Continued - Date(s): 3/22/2019     Intervention(s)  Therapist will teach and support the client in learning and using \"worry time\".    Objective #C  The client will use relaxation strategies two times per day to reduce the physical symptoms of anxiety.  Status: Continued - Date(s): 3/22/2019     Intervention(s)  Therapist will teach and support the client in learning and using relaxation strategies.    Objective #D  The client will use cognitive strategies identified in therapy to challenge anxious thoughts.    Status: Continued - Date(s): 3/22/2019     Intervention(s)  Therapist will teach and support the client in using cognitive strategies to challenge anxious thoughts.    Objective #E  The client will be mindful of the body sensations that he is experiencing when he is feeling anxious.  Status: Continued - Date(s): 3/22/2019     Intervention(s)  Therapist will  and support the client in being mindful of the body sensations that he is experiencing when he is feeling anxious.    Objective #F  The client will participate in EMDR treatment to reprocess traumatic memories.  Status: New - Date: 3/22/2019      Intervention(s)  Therapist will utilize " EMDR treatment to facilitate the client's reprocessing of traumatic memories.      The client has reviewed and agreed to the above plan.      MERCEDES Velasco.S.NEO., L.I.C.S.W.  March 22, 2019

## 2019-07-02 ENCOUNTER — OFFICE VISIT (OUTPATIENT)
Dept: PSYCHOLOGY | Facility: CLINIC | Age: 29
End: 2019-07-02
Payer: COMMERCIAL

## 2019-07-02 DIAGNOSIS — F32.2 MAJOR DEPRESSIVE DISORDER, SINGLE EPISODE, SEVERE WITH ANXIOUS DISTRESS (H): ICD-10-CM

## 2019-07-02 DIAGNOSIS — F41.1 GENERALIZED ANXIETY DISORDER: Primary | ICD-10-CM

## 2019-07-02 PROCEDURE — 90834 PSYTX W PT 45 MINUTES: CPT | Performed by: SOCIAL WORKER

## 2019-08-07 ENCOUNTER — OFFICE VISIT (OUTPATIENT)
Dept: PSYCHOLOGY | Facility: CLINIC | Age: 29
End: 2019-08-07
Payer: COMMERCIAL

## 2019-08-07 DIAGNOSIS — F41.1 GENERALIZED ANXIETY DISORDER: Primary | ICD-10-CM

## 2019-08-07 DIAGNOSIS — F32.2 MAJOR DEPRESSIVE DISORDER, SINGLE EPISODE, SEVERE WITH ANXIOUS DISTRESS (H): ICD-10-CM

## 2019-08-07 PROCEDURE — 90832 PSYTX W PT 30 MINUTES: CPT | Performed by: SOCIAL WORKER

## 2019-08-09 NOTE — PROGRESS NOTES
"                                             Progress Note    Client Name: Marvin Alves  Date: 8/7/2019         Service Type: Individual      Session Start Time: 3:55 PM  Session End Time: 4:25 PM      Session Length: 30 Minutes     Session #: 23     Attendees: Client    Treatment Plan Last Reviewed: 7/2/2019  PHQ-9 / KWASI-7 : 25/21     DATA      Progress Since Last Session (Related to Symptoms / Goals / Homework):   Symptoms: Improving : The client reported feeling significantly less anxious. He also stated that he has been experiencing increased energy and motivation.    Homework: Partially completed. The client reported that he has been using the \"calm place\" imagery as needed.      Episode of Care Goals: Satisfactory progress - ACTION (Actively working towards change); Intervened by reinforcing change plan / affirming steps taken.     Current / Ongoing Stressors and Concerns:   The client reported that his stress continues to be low.      Treatment Objective(s) Addressed in This Session:   The client will participate in EMDR treatment to reprocess traumatic memories.      Intervention:   EMDR: Discussed the client's progress since participating in EMDR treatment. Created a plan to reduce the client's individual therapy appointments to monthly. Obtained a commitment from the client to continue to use the \"calm place\" imagery as needed.        ASSESSMENT: Current Emotional / Mental Status (status of significant symptoms):   Risk status (Self / Other harm or suicidal ideation)   Client denies current fears or concerns for personal safety.   Client denies current or recent suicidal ideation or behaviors.   Client denies current or recent homicidal ideation or behaviors.   Client denies current or recent self injurious behavior or ideation.   Client denies other safety concerns.   A safety and risk management plan has not been developed at this time, however client was given the after-hours number / 911 should there " "be a change in any of these risk factors.     Appearance:   Appropriate    Eye Contact:   Good    Psychomotor Behavior: Normal    Attitude:   Cooperative    Orientation:   All   Speech    Rate / Production: Normal     Volume:  Normal    Mood:    Normal   Affect:    Appropriate    Thought Content:  Clear    Thought Form:  Coherent  Logical    Insight:    Good      Medication Review:   No current psychiatric medications prescribed.     Medication Compliance:   N/A     Changes in Health Issues:   None reported.     Chemical Use Review:   Substance Use: Chemical use reviewed. No active concerns identified.      Tobacco Use: No change in amount of tobacco use since last session.  Patient declined discussion at this time.     Collateral Reports Completed:   Not Applicable    PLAN: (Client Tasks / Therapist Tasks / Other)  Use the \"calm place\" imagery as needed.        MERCEDES Velasco.SPETR., MILO.                                                         ________________________________________________________________________    Treatment Plan    Client's Name: Marvin Alevs  YOB: 1990    Date: 7/2/2019    DSM-V Diagnoses:     296.23 (F32.2) Major Depressive Disorder, Single Episode, Severe With Anxious Distress  300.02 (F41.1) Generalized Anxiety Disorder    Psychosocial / Contextual Factors: The client is a [age] year old, , single male who resides with his mother, his father and his older brother in a home in [place], [place]. The client is currently unemployed. The client reported that he began experiencing symptoms of anxiety approximately three years ago. He indicated that he began experiencing symptoms of depression shortly thereafter. The client reported the reason for seeking therapy at this time is his lack of interest or ability to function in life.     WHODAS: 52    Referral / Collaboration:  Referral to another professional/service is not indicated at this " "time.    Anticipated number of session or this episode of care: 12      MeasurableTreatment Goal(s) related to diagnosis / functional impairment(s)  Goal 1: The client will learn and apply skills to manage the symptoms of anxiety that he has been experiencing.    I will know I've met my goal when I can relax.      Objective #A     The client will identify specific fears / thoughts that contribute to feeling anxious.  Status: Continued - Date(s): 7/2/2019     Intervention(s)  Therapist will assist the client in identifying specific fears/thoughts that contribute to feeling anxious.    Objective #B  The client will use  worry time  each day for 30 minutes of scheduled worry and then defer obsessive or anxious thinking until the next structured \"worry time\".  Status: Continued - Date(s): 7/2/2019     Intervention(s)  Therapist will teach and support the client in learning and using \"worry time\".    Objective #C  The client will use relaxation strategies two times per day to reduce the physical symptoms of anxiety.  Status: Continued - Date(s): 7/2/2019     Intervention(s)  Therapist will teach and support the client in learning and using relaxation strategies.    Objective #D  The client will use cognitive strategies identified in therapy to challenge anxious thoughts.    Status: Continued - Date(s): 7/2/2019     Intervention(s)  Therapist will teach and support the client in using cognitive strategies to challenge anxious thoughts.    Objective #E  The client will be mindful of the body sensations that he is experiencing when he is feeling anxious.  Status: Continued - Date(s): 7/2/2019     Intervention(s)  Therapist will  and support the client in being mindful of the body sensations that he is experiencing when he is feeling anxious.    Objective #F  The client will participate in EMDR treatment to reprocess traumatic memories.  Status: Continued - Date(s): 7/2/2019      Intervention(s)  Therapist will utilize " EMDR treatment to facilitate the client's reprocessing of traumatic memories.      The client has reviewed and agreed to the above plan.      MERCEDES Velasco.S.NEO., L.I.C.S.W.  July 3, 2019

## 2019-10-17 ENCOUNTER — OFFICE VISIT (OUTPATIENT)
Dept: PSYCHOLOGY | Facility: CLINIC | Age: 29
End: 2019-10-17
Payer: COMMERCIAL

## 2019-10-17 DIAGNOSIS — F32.2 MAJOR DEPRESSIVE DISORDER, SINGLE EPISODE, SEVERE WITH ANXIOUS DISTRESS (H): ICD-10-CM

## 2019-10-17 DIAGNOSIS — F41.1 GENERALIZED ANXIETY DISORDER: Primary | ICD-10-CM

## 2019-10-17 PROCEDURE — 90834 PSYTX W PT 45 MINUTES: CPT | Performed by: SOCIAL WORKER

## 2019-10-18 NOTE — PROGRESS NOTES
"                                             Progress Note    Client Name: Marvin Alves  Date: 10/17/2019         Service Type: Individual      Session Start Time: 2:50 PM  Session End Time: 3:30 PM      Session Length: 40 Minutes     Session #: 24     Attendees: Client    Treatment Plan Last Reviewed: 10/17/2019  PHQ-9 / KWASI-7 : 25/21     DATA      Progress Since Last Session (Related to Symptoms / Goals / Homework):   Symptoms: Worsening : The client reported feeling more depressed since he had an injury that has been preventing him from running and exercising.    Homework: Did not complete. The client reported that he has not been using the \"calm place\" imagery as needed.      Episode of Care Goals: No improvement - RELAPSE (Returned to unhealthy behavior); Intervened by reassessing readiness to change and identifying appropriate stage.  Identified reasons for relapse, successes, and change talk.     Current / Ongoing Stressors and Concerns:   The client reported that his stress continues to be low.      Treatment Objective(s) Addressed in This Session:   The client will use relaxation strategies two times per day to reduce the physical symptoms of anxiety. The client will participate in the review and revision of the individual treatment plan.      Intervention:   Discussed the client's recent injury and resulting affects on his mood. Obtained a commitment from the client to schedule an appointment with a doctor to address his injury. Practiced using a relaxation skill in vivo. Obtained a commitment from the client to use the relaxtion skill as needed. Reviewed and revised the individual treatment plan.        ASSESSMENT: Current Emotional / Mental Status (status of significant symptoms):   Risk status (Self / Other harm or suicidal ideation)   Client denies current fears or concerns for personal safety.   Client denies current or recent suicidal ideation or behaviors.   Client denies current or recent " homicidal ideation or behaviors.   Client denies current or recent self injurious behavior or ideation.   Client denies other safety concerns.   A safety and risk management plan has not been developed at this time, however client was given the after-hours number / 911 should there be a change in any of these risk factors.     Appearance:   Appropriate    Eye Contact:   Good    Psychomotor Behavior: Normal    Attitude:   Cooperative    Orientation:   All   Speech    Rate / Production: Normal     Volume:  Normal    Mood:    Depressed    Affect:    Appropriate    Thought Content:  Clear    Thought Form:  Coherent  Logical    Insight:    Fair      Medication Review:   No current psychiatric medications prescribed.     Medication Compliance:   N/A     Changes in Health Issues:   None reported.     Chemical Use Review:   Substance Use: Chemical use reviewed. No active concerns identified.      Tobacco Use: No change in amount of tobacco use since last session.  Patient declined discussion at this time.     Collateral Reports Completed:   Not Applicable    PLAN: (Client Tasks / Therapist Tasks / Other)  Schedule an appointment with a doctor to address his injury. Use the relaxtion skill as needed.        MERCEDES Velasco.S.NEO., LTERASPETR.                                                         ________________________________________________________________________    Treatment Plan    Client's Name: Marvin Alves  YOB: 1990    Date: 10/17/2019    DSM-V Diagnoses:     296.23 (F32.2) Major Depressive Disorder, Single Episode, Severe With Anxious Distress  300.02 (F41.1) Generalized Anxiety Disorder    Psychosocial / Contextual Factors: The client is a [age] year old, , single male who resides with his mother, his father and his older brother in a home in [place], [place]. The client is currently unemployed. The client reported that he began experiencing symptoms of anxiety approximately  "three years ago. He indicated that he began experiencing symptoms of depression shortly thereafter. The client reported the reason for seeking therapy at this time is his lack of interest or ability to function in life.     WHODAS: 52    Referral / Collaboration:  Referral to another professional/service is not indicated at this time.    Anticipated number of session or this episode of care: 12      MeasurableTreatment Goal(s) related to diagnosis / functional impairment(s)  Goal 1: The client will learn and apply skills to manage the symptoms of anxiety that he has been experiencing.    I will know I've met my goal when I can relax.      Objective #A     The client will identify specific fears / thoughts that contribute to feeling anxious.  Status: Continued - Date(s): 10/17/2019     Intervention(s)  Therapist will assist the client in identifying specific fears/thoughts that contribute to feeling anxious.    Objective #B  The client will use  worry time  each day for 30 minutes of scheduled worry and then defer obsessive or anxious thinking until the next structured \"worry time\".  Status: Continued - Date(s): 10/17/2019     Intervention(s)  Therapist will teach and support the client in learning and using \"worry time\".    Objective #C  The client will use relaxation strategies two times per day to reduce the physical symptoms of anxiety.  Status: Continued - Date(s): 10/17/2019     Intervention(s)  Therapist will teach and support the client in learning and using relaxation strategies.    Objective #D  The client will use cognitive strategies identified in therapy to challenge anxious thoughts.    Status: Continued - Date(s): 10/17/2019     Intervention(s)  Therapist will teach and support the client in using cognitive strategies to challenge anxious thoughts.    Objective #E  The client will be mindful of the body sensations that he is experiencing when he is feeling anxious.  Status: Continued - Date(s): " 10/17/2019     Intervention(s)  Therapist will  and support the client in being mindful of the body sensations that he is experiencing when he is feeling anxious.    Objective #F  The client will participate in EMDR treatment to reprocess traumatic memories.  Status: Continued - Date(s): 10/17/2019      Intervention(s)  Therapist will utilize EMDR treatment to facilitate the client's reprocessing of traumatic memories.      The client has reviewed and agreed to the above plan.      Elissa Mckeon, M.S.W., L.I.C.S.W.  October 18, 2019

## 2019-10-21 ENCOUNTER — OFFICE VISIT (OUTPATIENT)
Dept: FAMILY MEDICINE | Facility: CLINIC | Age: 29
End: 2019-10-21
Payer: COMMERCIAL

## 2019-10-21 VITALS
HEIGHT: 69 IN | TEMPERATURE: 98.4 F | SYSTOLIC BLOOD PRESSURE: 134 MMHG | BODY MASS INDEX: 31.25 KG/M2 | RESPIRATION RATE: 16 BRPM | WEIGHT: 211 LBS | OXYGEN SATURATION: 97 % | HEART RATE: 84 BPM | DIASTOLIC BLOOD PRESSURE: 74 MMHG

## 2019-10-21 DIAGNOSIS — I83.813 VARICOSE VEINS OF BOTH LOWER EXTREMITIES WITH PAIN: ICD-10-CM

## 2019-10-21 DIAGNOSIS — Z72.0 TOBACCO ABUSE: ICD-10-CM

## 2019-10-21 DIAGNOSIS — M79.661 BILATERAL CALF PAIN: Primary | ICD-10-CM

## 2019-10-21 DIAGNOSIS — M79.662 BILATERAL CALF PAIN: Primary | ICD-10-CM

## 2019-10-21 PROCEDURE — 99214 OFFICE O/P EST MOD 30 MIN: CPT | Performed by: NURSE PRACTITIONER

## 2019-10-21 ASSESSMENT — MIFFLIN-ST. JEOR: SCORE: 1917.47

## 2019-10-21 NOTE — PATIENT INSTRUCTIONS
Please call [facility] Imaging Department to schedule your imaging [phone].        Patient Education     Peripheral Artery Disease (PAD)   Peripheral artery disease (PAD) occurs when the arteries that carry blood to the limbs are narrowed or blocked. This is usually due to a buildup of a fatty substance called plaque in the walls of the arteries.  PAD most often affects the arteries in the legs. When these arteries are narrowed or blocked, blood flow to the legs is reduced. This can cause leg and foot pain and other symptoms. If severe enough, reduced blood flow to the legs can lead to tissue death (gangrene) and the loss of a toe, foot, or leg. Having PAD also makes it more likely that arteries in other body areas are blocked. For instance, arteries that carry blood to the heart or brain may be affected. This raises the chances of heart attack and stroke.  Risk factors  Certain factors can make PAD more likely. They include:    Smoking    Diabetes    High blood pressure    Unhealthy cholesterol levels    Obesity    Inactive lifestyle    Older age    Family history of PAD  Symptoms  Many people with PAD have no symptoms. If symptoms do occur, they can include:    Pain in the muscles of the calves, thighs, or hips that gets worse with activity and better with rest (intermittent claudication)    Achy, tired, or heavy feeling in the legs    Weakness, numbness, tingling, or loss of feeling in the legs    Changes in skin color of the legs    Sores on the legs and feet    Cold leg, feet, or toes    Pain the feet or toes even when lying down (rest pain)  Home care  PAD is a chronic (lifelong) condition. Treatment is focused on managing your condition and lowering your health risks. This may include doing the following:    If you smoke, quit. This helps prevent further damage to your arteries and lowers your health risks. Ask your provider about medicines or products that can help you quit smoking. Also consider  joining a stop-smoking program or support group.    Be more active. This helps you lose weight and manage problems such as high blood pressure and unhealthy cholesterol levels. Start a walking program if advised to by your provider. Your provider may also help you form a safe exercise program that is right for your needs.    Make healthy eating changes. This includes eating less fat, salt, and sugar.    Take medicines for high blood pressure, unhealthy cholesterol levels, and diabetes as directed.    Have your blood pressure and cholesterol levels checked as often as directed.    If you have diabetes, try to keep your blood sugar well controlled. Test your blood sugar as directed.    If you are overweight, talk to your provider about a weight-loss plan.    Watch for cuts, scrapes, or open sores on your feet. Poor blood flow to the feet may slow healing and increase the risk of infection from these problems.   Follow-up care  Follow up with your healthcare provider, or as advised. If imaging tests such as ultrasound were done, they will be reviewed by a doctor. You will be told the results and any new findings that may affect your care.  When to seek medical advice   Call your healthcare provider right away if any of these occur:    Sudden severe pain in the legs or feet    Sudden cold, pale or blue color in the legs or feet    Weakness or numbness in the legs or feet that worsens    Any sore or wound in the legs or feet that won t heal    Weak pulse in your legs or feet  Know the signs of heart attack and stroke  People with PAD are at high risk for heart attack and stroke. Knowing the signs of these problems can help you protect your health and get help when you need it. Call 911 right away if you have any of the following:    Chest discomfort, such as pain, aching, tightness, or pressure that lasts more than a few minutes, or that comes and goes    Pain or discomfort in the arms, back, shoulders, neck, or  jaw    Shortness of breath    Sweating (often a cold, clammy sweat)    Nausea    Lightheadedness    Sudden numbness or weakness of the face, arms, or legs, especially on one side    Sudden confusion or trouble speaking or understanding    Sudden trouble seeing in one or both eyes    Sudden trouble walking, dizziness, or loss of balance    Sudden, severe headache with no known cause  Date Last Reviewed: 3/1/2018    4203-2603 [employer]. [address] [place] [id number]. All rights reserved. This information is not intended as a substitute for professional medical care. Always follow your healthcare professional's instructions.

## 2019-10-21 NOTE — PROGRESS NOTES
"Subjective     Marvin Alves is a 29 year old male who presents to clinic today for the following health issues:    HPI   Joint Pain    Onset: 6 weeks     Description:   Location: bilateral leg pain, mainly in Right but sx starting to show in the left as well. Pain is behind knees that radiates into calf muscle.   Character: \"tension\" with vein swelling and redness at ankles.    Intensity: moderate    Progression of Symptoms: better since he stopped running as much as he was     Accompanying Signs & Symptoms:  Other symptoms: numbness and tingling in feet, says he has redness in his ankles when running     History:   Previous similar pain: no   1ppd tobacco smoker  Denies hx of blood clots in legs.      Precipitating factors:   Trauma or overuse: YES- working out more to help with depression    Alleviating factors:  Improved by: heat and aspirin and aleve    Therapies Tried and outcome: listed above       Patient Active Problem List   Diagnosis     Alcohol abuse counseling and surveillance     Anxiety attack     Obesity due to excess calories     Tobacco abuse     History reviewed. No pertinent surgical history.    Social History     Tobacco Use     Smoking status: Current Every Day Smoker     Packs/day: 1.00     Years: 5.00     Pack years: 5.00     Types: Cigarettes     Smokeless tobacco: Never Used   Substance Use Topics     Alcohol use: Not Currently     Alcohol/week: 0.0 standard drinks     Comment: occasional but once he starts drinking he continues to drink.     Family History   Problem Relation Age of Onset     Diabetes Mother      Hypertension Mother      Diabetes Father      Depression Father      Diabetes Maternal Grandmother      Bipolar Disorder Brother      Schizophrenia Paternal Grandfather      Bipolar Disorder Paternal Uncle              Reviewed and updated as needed this visit by Provider         Review of Systems   ROS COMP: Constitutional, HEENT, cardiovascular, pulmonary, gi and gu systems are " "negative, except as otherwise noted.      Objective    /74 (BP Location: Right arm, Patient Position: Sitting, Cuff Size: Adult Large)   Pulse 84   Temp 98.4  F (36.9  C) (Tympanic)   Resp 16   Ht 1.753 m (5' 9\")   Wt 95.7 kg (211 lb)   SpO2 97%   BMI 31.16 kg/m    Body mass index is 31.16 kg/m .  Physical Exam   GENERAL: healthy, alert and no distress  CV: regular rates and rhythm, normal S1 S2, no S3 or S4, peripheral pulses strong and no peripheral edema  MS: varicose veins - bilateral inner/upper calves, no firmness, erythema, warmth or tenderness, RLE exam shows normal strength and muscle mass, no deformities, no erythema, induration, or nodules, no evidence of joint effusion, ROM of all joints is normal and no calf tenderness, negative Monica's and 16 inch calf circumference and LLE exam shows normal strength and muscle mass, no deformities, no erythema, induration, or nodules, no evidence of joint effusion, ROM of all joints is normal and no calf tenderness, negative Monica's and 16 inch calf circumference.  NEURO: Normal strength and tone, mentation intact and speech normal  PSYCH: mentation appears normal, anxious and judgement and insight intact    Diagnostic Test Results:  Labs reviewed in Epic        Assessment & Plan       ICD-10-CM    1. Bilateral calf pain M79.661 US VASQUEZ Doppler with Exercise Bilateral    M79.662    2. Varicose veins of both lower extremities with pain I83.813 order for DME   3. Tobacco abuse Z72.0         Tobacco Cessation:   reports that he has been smoking cigarettes. He has a 5.00 pack-year smoking history. He has never used smokeless tobacco.  Tobacco Cessation Action Plan: Information offered: Patient not interested at this time      BMI:   Estimated body mass index is 31.16 kg/m  as calculated from the following:    Height as of this encounter: 1.753 m (5' 9\").    Weight as of this encounter: 95.7 kg (211 lb).   Weight management plan: Discussed healthy diet and " exercise guidelines        FURTHER TESTING:       - VASQUEZ ultrasound to rule out PAD    CONSULTATION/REFERRAL- will refer to physical therapy if ultrasound negative.    FUTURE APPOINTMENTS:       - RETURN TO CLINIC for new or worsening symptoms. Rx for compression stockings to use while running for varicose veins.     Patient Instructions   Please call [facility] Imaging Department to schedule your imaging [phone].        Patient Education     Peripheral Artery Disease (PAD)   Peripheral artery disease (PAD) occurs when the arteries that carry blood to the limbs are narrowed or blocked. This is usually due to a buildup of a fatty substance called plaque in the walls of the arteries.  PAD most often affects the arteries in the legs. When these arteries are narrowed or blocked, blood flow to the legs is reduced. This can cause leg and foot pain and other symptoms. If severe enough, reduced blood flow to the legs can lead to tissue death (gangrene) and the loss of a toe, foot, or leg. Having PAD also makes it more likely that arteries in other body areas are blocked. For instance, arteries that carry blood to the heart or brain may be affected. This raises the chances of heart attack and stroke.  Risk factors  Certain factors can make PAD more likely. They include:    Smoking    Diabetes    High blood pressure    Unhealthy cholesterol levels    Obesity    Inactive lifestyle    Older age    Family history of PAD  Symptoms  Many people with PAD have no symptoms. If symptoms do occur, they can include:    Pain in the muscles of the calves, thighs, or hips that gets worse with activity and better with rest (intermittent claudication)    Achy, tired, or heavy feeling in the legs    Weakness, numbness, tingling, or loss of feeling in the legs    Changes in skin color of the legs    Sores on the legs and feet    Cold leg, feet, or toes    Pain the feet or toes even when lying down (rest pain)  Home care  PAD is a  chronic (lifelong) condition. Treatment is focused on managing your condition and lowering your health risks. This may include doing the following:    If you smoke, quit. This helps prevent further damage to your arteries and lowers your health risks. Ask your provider about medicines or products that can help you quit smoking. Also consider joining a stop-smoking program or support group.    Be more active. This helps you lose weight and manage problems such as high blood pressure and unhealthy cholesterol levels. Start a walking program if advised to by your provider. Your provider may also help you form a safe exercise program that is right for your needs.    Make healthy eating changes. This includes eating less fat, salt, and sugar.    Take medicines for high blood pressure, unhealthy cholesterol levels, and diabetes as directed.    Have your blood pressure and cholesterol levels checked as often as directed.    If you have diabetes, try to keep your blood sugar well controlled. Test your blood sugar as directed.    If you are overweight, talk to your provider about a weight-loss plan.    Watch for cuts, scrapes, or open sores on your feet. Poor blood flow to the feet may slow healing and increase the risk of infection from these problems.   Follow-up care  Follow up with your healthcare provider, or as advised. If imaging tests such as ultrasound were done, they will be reviewed by a doctor. You will be told the results and any new findings that may affect your care.  When to seek medical advice   Call your healthcare provider right away if any of these occur:    Sudden severe pain in the legs or feet    Sudden cold, pale or blue color in the legs or feet    Weakness or numbness in the legs or feet that worsens    Any sore or wound in the legs or feet that won t heal    Weak pulse in your legs or feet  Know the signs of heart attack and stroke  People with PAD are at high risk for heart attack and stroke.  Knowing the signs of these problems can help you protect your health and get help when you need it. Call 911 right away if you have any of the following:    Chest discomfort, such as pain, aching, tightness, or pressure that lasts more than a few minutes, or that comes and goes    Pain or discomfort in the arms, back, shoulders, neck, or jaw    Shortness of breath    Sweating (often a cold, clammy sweat)    Nausea    Lightheadedness    Sudden numbness or weakness of the face, arms, or legs, especially on one side    Sudden confusion or trouble speaking or understanding    Sudden trouble seeing in one or both eyes    Sudden trouble walking, dizziness, or loss of balance    Sudden, severe headache with no known cause  Date Last Reviewed: 3/1/2018    [id number] [employer]. [address] [place] [address], [place] [address]. All rights reserved. This information is not intended as a substitute for professional medical care. Always follow your healthcare professional's instructions.               No follow-ups on file.    EDSON Mobley [facility]

## 2019-10-25 ENCOUNTER — HOSPITAL ENCOUNTER (OUTPATIENT)
Dept: ULTRASOUND IMAGING | Facility: CLINIC | Age: 29
Discharge: HOME | End: 2019-10-25
Attending: NURSE PRACTITIONER | Admitting: NURSE PRACTITIONER
Payer: COMMERCIAL

## 2019-10-25 DIAGNOSIS — M79.662 BILATERAL CALF PAIN: ICD-10-CM

## 2019-10-25 DIAGNOSIS — M79.661 BILATERAL CALF PAIN: ICD-10-CM

## 2019-10-25 PROCEDURE — 93924 LWR XTR VASC STDY BILAT: CPT

## 2019-10-28 ENCOUNTER — TELEPHONE (OUTPATIENT)
Dept: FAMILY MEDICINE | Facility: CLINIC | Age: 29
End: 2019-10-28

## 2019-10-28 DIAGNOSIS — M79.662 BILATERAL CALF PAIN: Primary | ICD-10-CM

## 2019-10-28 DIAGNOSIS — M79.661 BILATERAL CALF PAIN: Primary | ICD-10-CM

## 2019-10-28 NOTE — TELEPHONE ENCOUNTER
Can you please let Juancarlos know that his ultrasound of the legs is normal. I would recommend physical therapy for the lower leg pain. If he would like a referral I can sent one in. Thank you. EDSON Gregorio CNP

## 2019-11-07 ENCOUNTER — HOSPITAL ENCOUNTER (OUTPATIENT)
Dept: PHYSICAL THERAPY | Facility: CLINIC | Age: 29
Setting detail: THERAPIES SERIES
End: 2019-11-07
Attending: NURSE PRACTITIONER
Payer: COMMERCIAL

## 2019-11-07 DIAGNOSIS — M79.661 BILATERAL CALF PAIN: ICD-10-CM

## 2019-11-07 DIAGNOSIS — M79.662 BILATERAL CALF PAIN: ICD-10-CM

## 2019-11-07 PROCEDURE — 97110 THERAPEUTIC EXERCISES: CPT | Mod: GP | Performed by: PHYSICAL THERAPIST

## 2019-11-07 PROCEDURE — 97161 PT EVAL LOW COMPLEX 20 MIN: CPT | Mod: GP | Performed by: PHYSICAL THERAPIST

## 2019-11-07 NOTE — PROGRESS NOTES
Marvin Alves  1990 Physical Therapy Initial Evaluation  11/07/19 1400   General Information   Type of Visit Initial OP Ortho PT Evaluation   Start of Care Date 11/07/19   Referring Physician EDSON Gregorio, CNP   Patient/Family Goals Statement Run without pain   Orders Evaluate and Treat   Date of Order 10/28/19   Certification Required? Yes   Medical Diagnosis Bilateral calf pain   Surgical/Medical history reviewed Yes   Precautions/Limitations no known precautions/limitations   Body Part(s)   Body Part(s) Knee;Lumbar Spine/SI   Presentation and Etiology   Pertinent history of current problem (include personal factors and/or comorbidities that impact the POC) Mainly having pain in right calf and sometimes in left calf. Has been running for everyday for over a year. Then started doing sprints up a steep incline and had soreness after that. Couldn't run for about 3-4 days after that. Has slowly been getting into running recently. Wears compression stockings which have helped with running. Running 1 mile per day. / Comorbidities - Obesity, Tobacco abuse, Anxiety attack, Alcohol abuse counseling surveilence.   Impairments A. Pain;C. Swelling;E. Decreased flexibility;G. Impaired balance;H. Impaired gait   Functional Limitations perform activities of daily living;perform desired leisure / sports activities   Symptom Location Bilateral calves (R>L)   How/Where did it occur With repetition/overuse   Onset date of current episode/exacerbation 09/01/19   Chronicity Chronic   Pain rating (0-10 point scale) Best (/10);Worst (/10)   Best (/10) 0   Worst (/10) 7   Pain quality C. Aching;D. Burning;G. Cramping   Frequency of pain/symptoms C. With activity   Pain/symptoms exacerbated by B. Walking   Pain/symptoms eased by C. Rest;G. Heat;K. Other   Pain eased by comment Diet   Progression of symptoms since onset: Improved   Current / Previous Interventions   Diagnostic Tests: Other   Other diagnostic tests US VASQUEZ -  Unremarkable   Current Level of Function   Patient role/employment history E. Unemployed   Fall Risk Screen   Fall screen completed by PT   Have you fallen 2 or more times in the past year? No   Have you fallen and had an injury in the past year? No   Is patient a fall risk? No   Abuse Screen (yes response referral indicated)   Feels Unsafe at Home or Work/School   (Deferred due to family member present)   Knee Objective Findings   Side (if bilateral, select both right and left) Right   Gait/Locomotion No deficits noted   Knee/Hip Strength Comments Hip extension - 4-/5 B   Anterior Drawer Test Negative   Posterior Drawer Test Negative   Varus Stress Test Negative   Valgus Stress Test Negative   Apley's Test Negative   Palpation Hypertonicity of gastrocnemius R>L   Right Knee Extension AROM 0 B   Right Knee Flexion PROM 125 / Left - 130   Right Knee Flexion Strength 4/5 B   Right Knee Extension Strength 4/5 B   Right Hip Abduction Strength 4-/5 B   Right Gastrocnemius Flexibility Moderately restricted B   Right Hamstring Flexibility Mildly restricted B   Lumbar Spine/SI Objective Findings   SLR Negative B   Slump Test Negative B   Planned Therapy Interventions   Planned Therapy Interventions gait training;balance training;joint mobilization;manual therapy;neuromuscular re-education;ROM;strengthening;stretching   Planned Modality Interventions   Planned Modality Interventions Hot packs;Cryotherapy   Clinical Impression   Criteria for Skilled Therapeutic Interventions Met yes, treatment indicated   PT Diagnosis Bilateral calf pain with strength and flexibility deficits of knee and hip musculature   Influenced by the following impairments Pain, Strength and flexibility deficits   Functional limitations due to impairments Difficulty running   Clinical Presentation Stable/Uncomplicated   Clinical Presentation Rationale Several comorbidities impacting PT / 1 body system / Stable   Clinical Decision Making (Complexity) Low  complexity   Therapy Frequency 1 time/week   Predicted Duration of Therapy Intervention (days/wks) 1 month   Risk & Benefits of therapy have been explained Yes   Patient, Family & other staff in agreement with plan of care Yes   Education Assessment   Preferred Learning Style Listening;Reading;Demonstration;Pictures/video   Barriers to Learning No barriers   ORTHO GOALS   PT Ortho Eval Goals 1;2;3;4   Ortho Goal 1   Goal Identifier HEP   Goal Description Pt will be independent in HEP in order to achieve and maintain long term treatment goals.   Target Date 12/07/19   Ortho Goal 2   Goal Identifier Running   Goal Description Pt will be able to return to running activities for 2 miles per day with a minimal increase in discomfort noted.   Target Date 12/07/19   Total Evaluation Time   PT Ghassan, Low Complexity Minutes (34974) 21   Therapy Certification   Certification date from 11/07/19   Certification date to 12/07/19   Medical Diagnosis Bilateral calf pain     Juventino Rivas, PT, DPT

## 2019-11-07 NOTE — PROGRESS NOTES
[facility]          OUTPATIENT PHYSICAL THERAPY ORTHOPEDIC EVALUATION  PLAN OF TREATMENT FOR OUTPATIENT REHABILITATION  (COMPLETE FOR INITIAL CLAIMS ONLY)  Patient's Last Name, First Name, M.I.  YOB: 1990  Juancarlos Alvesua  HOWARD    Provider s Name:  [facility]   Medical Record No.  [id number]   Start of Care Date:  11/07/19   Onset Date:  09/01/19   Type:     _X__PT   ___OT   ___SLP Medical Diagnosis:  Bilateral calf pain     PT Diagnosis:  Bilateral calf pain with strength and flexibility deficits of knee and hip musculature   Visits from SOC:  1      _________________________________________________________________________________  Plan of Treatment/Functional Goals:  gait training, balance training, joint mobilization, manual therapy, neuromuscular re-education, ROM, strengthening, stretching     Hot packs, Cryotherapy     Goals  Goal Identifier: HEP  Goal Description: Pt will be independent in HEP in order to achieve and maintain long term treatment goals.  Target Date: 12/07/19    Goal Identifier: Running  Goal Description: Pt will be able to return to running activities for 2 miles per day with a minimal increase in discomfort noted.  Target Date: 12/07/19       Therapy Frequency:  1 time/week  Predicted Duration of Therapy Intervention:  1 month    Mihai Rivas, PT                 I CERTIFY THE NEED FOR THESE SERVICES FURNISHED UNDER        THIS PLAN OF TREATMENT AND WHILE UNDER MY CARE     (Physician co-signature of this document indicates review and certification of the therapy plan).                       Certification Date From:  11/07/19   Certification Date To:  12/07/19    Referring Provider:  EDSON Gregorio CNP    Initial Assessment        See Epic Evaluation Start of Care Date: 11/07/19

## 2019-11-14 ENCOUNTER — HOSPITAL ENCOUNTER (OUTPATIENT)
Dept: PHYSICAL THERAPY | Facility: CLINIC | Age: 29
Setting detail: THERAPIES SERIES
End: 2019-11-14
Attending: NURSE PRACTITIONER
Payer: COMMERCIAL

## 2019-11-14 PROCEDURE — 97110 THERAPEUTIC EXERCISES: CPT | Mod: GP | Performed by: PHYSICAL THERAPIST

## 2019-11-25 ENCOUNTER — OFFICE VISIT (OUTPATIENT)
Dept: PSYCHOLOGY | Facility: CLINIC | Age: 29
End: 2019-11-25
Payer: COMMERCIAL

## 2019-11-25 DIAGNOSIS — F32.2 MAJOR DEPRESSIVE DISORDER, SINGLE EPISODE, SEVERE WITH ANXIOUS DISTRESS (H): ICD-10-CM

## 2019-11-25 DIAGNOSIS — F41.1 GENERALIZED ANXIETY DISORDER: Primary | ICD-10-CM

## 2019-11-25 PROCEDURE — 90834 PSYTX W PT 45 MINUTES: CPT | Performed by: SOCIAL WORKER

## 2019-11-26 NOTE — PROGRESS NOTES
Progress Note    Client Name: Marvin Alves  Date: 11/25/2019         Service Type: Individual      Session Start Time: 5:15 PM  Session End Time: 6:00 PM      Session Length: 45 Minutes     Session #: 25     Attendees: Client    Treatment Plan Last Reviewed: 10/17/2019  PHQ-9 / KWASI-7 : 25/21     DATA      Progress Since Last Session (Related to Symptoms / Goals / Homework):   Symptoms: Improving : The client reported experiencing increased motivation and increased energy. He indicated that he has been feeling less anxious.    Homework: Achieved / completed to satisfaction. The client reported that he did schedule an appointment with a doctor to address his injury. He also stated that he has been using the relaxation strategy as needed.      Episode of Care Goals: Satisfactory progress - ACTION (Actively working towards change); Intervened by reinforcing change plan / affirming steps taken.     Current / Ongoing Stressors and Concerns:   The client reported that his stress continues to be low.      Treatment Objective(s) Addressed in This Session:   The client will use relaxation strategies two times per day to reduce the physical symptoms of anxiety.      Intervention:   Discussed and reinforced the client's efforts to address his injury. Talked about his experience in physical therapy. Examined the client's improved mood and decreased anxiety. Discussed and reinforced the client's efforts to use the relaxation strategy as needed. Obtained a commitment from the client to continue to use the relaxation strategy as needed.        ASSESSMENT: Current Emotional / Mental Status (status of significant symptoms):   Risk status (Self / Other harm or suicidal ideation)   Client denies current fears or concerns for personal safety.   Client denies current or recent suicidal ideation or behaviors.   Client denies current or recent homicidal ideation or behaviors.   Client denies  current or recent self injurious behavior or ideation.   Client denies other safety concerns.   A safety and risk management plan has not been developed at this time, however client was given the after-hours number / 911 should there be a change in any of these risk factors.     Appearance:   Appropriate    Eye Contact:   Good    Psychomotor Behavior: Normal    Attitude:   Cooperative    Orientation:   All   Speech    Rate / Production: Normal     Volume:  Normal    Mood:    Normal   Affect:    Appropriate    Thought Content:  Clear    Thought Form:  Coherent  Logical    Insight:    Fair      Medication Review:   No current psychiatric medications prescribed.     Medication Compliance:   N/A     Changes in Health Issues:   None reported.     Chemical Use Review:   Substance Use: Chemical use reviewed. No active concerns identified.      Tobacco Use: No change in amount of tobacco use since last session.  Patient declined discussion at this time.     Collateral Reports Completed:   Not Applicable    PLAN: (Client Tasks / Therapist Tasks / Other)  Continue to use the relaxtion skill as needed.        SUKH Velasco., MILO.                                                         ________________________________________________________________________    Treatment Plan    Client's Name: Marvin Alves  YOB: 1990    Date: 10/17/2019    DSM-V Diagnoses:     296.23 (F32.2) Major Depressive Disorder, Single Episode, Severe With Anxious Distress  300.02 (F41.1) Generalized Anxiety Disorder    Psychosocial / Contextual Factors: The client is a [age] year old, , single male who resides with his mother, his father and his older brother in a home in [place], [place]. The client is currently unemployed. The client reported that he began experiencing symptoms of anxiety approximately three years ago. He indicated that he began experiencing symptoms of depression shortly thereafter. The  "client reported the reason for seeking therapy at this time is his lack of interest or ability to function in life.     WHODAS: 52    Referral / Collaboration:  Referral to another professional/service is not indicated at this time.    Anticipated number of session or this episode of care: 12      MeasurableTreatment Goal(s) related to diagnosis / functional impairment(s)  Goal 1: The client will learn and apply skills to manage the symptoms of anxiety that he has been experiencing.    I will know I've met my goal when I can relax.      Objective #A     The client will identify specific fears / thoughts that contribute to feeling anxious.  Status: Continued - Date(s): 10/17/2019     Intervention(s)  Therapist will assist the client in identifying specific fears/thoughts that contribute to feeling anxious.    Objective #B  The client will use  worry time  each day for 30 minutes of scheduled worry and then defer obsessive or anxious thinking until the next structured \"worry time\".  Status: Continued - Date(s): 10/17/2019     Intervention(s)  Therapist will teach and support the client in learning and using \"worry time\".    Objective #C  The client will use relaxation strategies two times per day to reduce the physical symptoms of anxiety.  Status: Continued - Date(s): 10/17/2019     Intervention(s)  Therapist will teach and support the client in learning and using relaxation strategies.    Objective #D  The client will use cognitive strategies identified in therapy to challenge anxious thoughts.    Status: Continued - Date(s): 10/17/2019     Intervention(s)  Therapist will teach and support the client in using cognitive strategies to challenge anxious thoughts.    Objective #E  The client will be mindful of the body sensations that he is experiencing when he is feeling anxious.  Status: Continued - Date(s): 10/17/2019     Intervention(s)  Therapist will  and support the client in being mindful of the body " sensations that he is experiencing when he is feeling anxious.    Objective #F  The client will participate in EMDR treatment to reprocess traumatic memories.  Status: Continued - Date(s): 10/17/2019      Intervention(s)  Therapist will utilize EMDR treatment to facilitate the client's reprocessing of traumatic memories.      The client has reviewed and agreed to the above plan.      MERCEDES Velasco.S.NEO., L.I.C.S.W.  October 18, 2019

## 2019-12-20 ENCOUNTER — TELEPHONE (OUTPATIENT)
Dept: FAMILY MEDICINE | Facility: CLINIC | Age: 29
End: 2019-12-20

## 2019-12-20 ASSESSMENT — PATIENT HEALTH QUESTIONNAIRE - PHQ9: SUM OF ALL RESPONSES TO PHQ QUESTIONS 1-9: 0

## 2019-12-20 NOTE — TELEPHONE ENCOUNTER
Panel Management Review      Patient has the following on his problem list:     Depression / Dysthymia review    Measure:  Needs PHQ-9 score of 4 or less during index window.  Administer PHQ-9 and if score is 5 or more, send encounter to provider for next steps.    5 - 7 month window range: Due    PHQ-9 SCORE 11/10/2017 1/16/2018 7/6/2018   PHQ-9 Total Score 16 25 3       If PHQ-9 recheck is 5 or more, route to provider for next steps.    Patient is due for:  PHQ9      Composite cancer screening  Chart review shows that this patient is due/due soon for the following None  Summary:    Patient is due/failing the following:   Flu vaccine and PHQ9    Action needed:   Patient needs to do PHQ9. and Patient needs referral/order: flu vaccine    Type of outreach:    Phone, left message for patient to call back.     Questions for provider review:    None                                                                                                                                    Lucy Mistry MA       Chart routed to Care Team .

## 2020-01-07 ENCOUNTER — OFFICE VISIT (OUTPATIENT)
Dept: PSYCHOLOGY | Facility: CLINIC | Age: 30
End: 2020-01-07
Payer: COMMERCIAL

## 2020-01-07 DIAGNOSIS — F32.2 MAJOR DEPRESSIVE DISORDER, SINGLE EPISODE, SEVERE WITH ANXIOUS DISTRESS (H): ICD-10-CM

## 2020-01-07 DIAGNOSIS — F41.1 GENERALIZED ANXIETY DISORDER: Primary | ICD-10-CM

## 2020-01-07 PROCEDURE — 90832 PSYTX W PT 30 MINUTES: CPT | Performed by: SOCIAL WORKER

## 2020-01-08 NOTE — PROGRESS NOTES
"                                             Progress Note    Client Name: Marvin Alves  Date: 1/7/2020         Service Type: Individual      Session Start Time: 1:50 PM  Session End Time: 2:20 PM      Session Length: 30 Minutes     Session #: 26     Attendees: Client    Treatment Plan Last Reviewed: 10/17/2019  PHQ-9 / KWASI-7 : 25/21     DATA      Progress Since Last Session (Related to Symptoms / Goals / Homework):   Symptoms: Improving : The client reported experiencing continued increased motivation and increased energy.    Homework: Achieved / completed to satisfaction. The client reported that he has been using the relaxation strategy as needed.      Episode of Care Goals: Satisfactory progress - ACTION (Actively working towards change); Intervened by reinforcing change plan / affirming steps taken.     Current / Ongoing Stressors and Concerns:   The client reported that his stress continues to be low. He stated that he has been considering looking for a job.     Treatment Objective(s) Addressed in This Session:   The client will use relaxation strategies two times per day to reduce the physical symptoms of anxiety.      Intervention:   Examined the client's continued improved mood and decreased anxiety. Discussed and reinforced the client's efforts to use the relaxation strategy as needed. Obtained a commitment from the client to continue to use the relaxation strategy as needed. Talked at length about the client's desire to \"have more to do\" and the possibility of him looking for a job.        ASSESSMENT: Current Emotional / Mental Status (status of significant symptoms):   Risk status (Self / Other harm or suicidal ideation)   Client denies current fears or concerns for personal safety.   Client denies current or recent suicidal ideation or behaviors.   Client denies current or recent homicidal ideation or behaviors.   Client denies current or recent self injurious behavior or ideation.   Client denies " other safety concerns.   A safety and risk management plan has not been developed at this time, however client was given the after-hours number / 911 should there be a change in any of these risk factors.     Appearance:   Appropriate    Eye Contact:   Good    Psychomotor Behavior: Normal    Attitude:   Cooperative    Orientation:   All   Speech    Rate / Production: Normal     Volume:  Normal    Mood:    Normal   Affect:    Appropriate    Thought Content:  Clear    Thought Form:  Coherent  Logical    Insight:    Fair      Medication Review:   No current psychiatric medications prescribed.     Medication Compliance:   N/A     Changes in Health Issues:   None reported.     Chemical Use Review:   Substance Use: Chemical use reviewed. No active concerns identified.      Tobacco Use: No change in amount of tobacco use since last session.  Patient declined discussion at this time.     Collateral Reports Completed:   Not Applicable    PLAN: (Client Tasks / Therapist Tasks / Other)  Continue to use the relaxtion skill as needed.        NASIR VelascoSPETR., MILO.                                                         ________________________________________________________________________    Treatment Plan    Client's Name: Marvin Alves  YOB: 1990    Date: 10/17/2019    DSM-V Diagnoses:     296.23 (F32.2) Major Depressive Disorder, Single Episode, Severe With Anxious Distress  300.02 (F41.1) Generalized Anxiety Disorder    Psychosocial / Contextual Factors: The client is a [age] year old, , single male who resides with his mother, his father and his older brother in a home in [place], [place]. The client is currently unemployed. The client reported that he began experiencing symptoms of anxiety approximately three years ago. He indicated that he began experiencing symptoms of depression shortly thereafter. The client reported the reason for seeking therapy at this time is his lack  "of interest or ability to function in life.     WHODAS: 52    Referral / Collaboration:  Referral to another professional/service is not indicated at this time.    Anticipated number of session or this episode of care: 12      MeasurableTreatment Goal(s) related to diagnosis / functional impairment(s)  Goal 1: The client will learn and apply skills to manage the symptoms of anxiety that he has been experiencing.    I will know I've met my goal when I can relax.      Objective #A     The client will identify specific fears / thoughts that contribute to feeling anxious.  Status: Continued - Date(s): 10/17/2019     Intervention(s)  Therapist will assist the client in identifying specific fears/thoughts that contribute to feeling anxious.    Objective #B  The client will use  worry time  each day for 30 minutes of scheduled worry and then defer obsessive or anxious thinking until the next structured \"worry time\".  Status: Continued - Date(s): 10/17/2019     Intervention(s)  Therapist will teach and support the client in learning and using \"worry time\".    Objective #C  The client will use relaxation strategies two times per day to reduce the physical symptoms of anxiety.  Status: Continued - Date(s): 10/17/2019     Intervention(s)  Therapist will teach and support the client in learning and using relaxation strategies.    Objective #D  The client will use cognitive strategies identified in therapy to challenge anxious thoughts.    Status: Continued - Date(s): 10/17/2019     Intervention(s)  Therapist will teach and support the client in using cognitive strategies to challenge anxious thoughts.    Objective #E  The client will be mindful of the body sensations that he is experiencing when he is feeling anxious.  Status: Continued - Date(s): 10/17/2019     Intervention(s)  Therapist will  and support the client in being mindful of the body sensations that he is experiencing when he is feeling anxious.    Objective " #F  The client will participate in EMDR treatment to reprocess traumatic memories.  Status: Continued - Date(s): 10/17/2019      Intervention(s)  Therapist will utilize EMDR treatment to facilitate the client's reprocessing of traumatic memories.      The client has reviewed and agreed to the above plan.      MERCEDES Velasco.S.NEO., L.KASSANDRA.C.S.W.  October 18, 2019

## 2020-01-21 ENCOUNTER — DOCUMENTATION ONLY (OUTPATIENT)
Dept: PHYSICAL THERAPY | Facility: CLINIC | Age: 30
End: 2020-01-21

## 2020-01-21 NOTE — PROGRESS NOTES
"Outpatient Physical Therapy Discharge Note     Patient: Marvin Alves  : 1990    Beginning/End Dates of Reporting Period:  2019 to 2019 (Total of 2 visits)    Referring Provider: Danii Almeida    Diagnosis: Bilateral calf pain     Client Self Report:  (From date of last visit)  Hasn't been doing the  exercises \"so much\". The  first day after doing  the stretches running  felt really good. Not  having as much pain.    Objective Measurements: (From date of last visit)  Observation - Cueing required for several exercises    Gait - Non antalgic    Treatment Has Consisted Of:  Stretching and strengthening exercise education    Goals:  Goals had not been met at time of last visit.    Plan:  Discharge from therapy.    Discharge:    Reason for Discharge: Patient has not made expected progress due to interrupted treatment attendance.    Equipment Issued: None    Discharge Plan: Patient to continue home program.    Juventino Rivas, PT, DPT  "

## 2020-01-23 ENCOUNTER — OFFICE VISIT (OUTPATIENT)
Dept: PSYCHOLOGY | Facility: CLINIC | Age: 30
End: 2020-01-23
Payer: COMMERCIAL

## 2020-01-23 DIAGNOSIS — F41.1 GENERALIZED ANXIETY DISORDER: Primary | ICD-10-CM

## 2020-01-23 DIAGNOSIS — F32.2 MAJOR DEPRESSIVE DISORDER, SINGLE EPISODE, SEVERE WITH ANXIOUS DISTRESS (H): ICD-10-CM

## 2020-01-23 PROCEDURE — 90834 PSYTX W PT 45 MINUTES: CPT | Performed by: SOCIAL WORKER

## 2020-01-24 NOTE — PROGRESS NOTES
"                                             Progress Note    Client Name: Marvin Alves  Date: 1/23/2020         Service Type: Individual      Session Start Time: 3:55 PM  Session End Time: 4:35 PM      Session Length: 40 Minutes     Session #: 27     Attendees: Client    Treatment Plan Last Reviewed: 1/23/2020  PHQ-9 / KWASI-7 : 25/21     DATA      Progress Since Last Session (Related to Symptoms / Goals / Homework):   Symptoms: Worsening : The client reported feeling less motivated and less energetic. He stated that he is \"bored\".    Homework: Achieved / completed to satisfaction. The client reported that he has been using the relaxation strategy as needed.      Episode of Care Goals: Satisfactory progress - ACTION (Actively working towards change); Intervened by reinforcing change plan / affirming steps taken.     Current / Ongoing Stressors and Concerns:   The client reported that his stress continues to be low. He stated that he has been considering starting to \"jam\" with his friends again.     Treatment Objective(s) Addressed in This Session:   The client will use relaxation strategies two times per day to reduce the physical symptoms of anxiety. The client will participate in the review and revision of the individual treatment plan.      Intervention:   Discussed and reinforced the client's efforts to use the relaxation strategy as needed. Obtained a commitment from the client to continue to use the relaxation strategy as needed. Talked about his desire to \"jam\" with his friends. Obtained a commitment from the client to \"jam\" with his friends one time before the next scheduled individual therapy session. Reviewed and revised the individual treatment plan.        ASSESSMENT: Current Emotional / Mental Status (status of significant symptoms):   Risk status (Self / Other harm or suicidal ideation)   Client denies current fears or concerns for personal safety.   Client denies current or recent suicidal ideation " "or behaviors.   Client denies current or recent homicidal ideation or behaviors.   Client denies current or recent self injurious behavior or ideation.   Client denies other safety concerns.   A safety and risk management plan has not been developed at this time, however client was given the after-hours number / 911 should there be a change in any of these risk factors.     Appearance:   Appropriate    Eye Contact:   Good    Psychomotor Behavior: Normal    Attitude:   Cooperative    Orientation:   All   Speech    Rate / Production: Normal     Volume:  Normal    Mood:    Mildly Depressed    Affect:    Appropriate    Thought Content:  Clear    Thought Form:  Coherent  Logical    Insight:    Fair      Medication Review:   No current psychiatric medications prescribed.     Medication Compliance:   N/A     Changes in Health Issues:   None reported.     Chemical Use Review:   Substance Use: Chemical use reviewed. No active concerns identified.      Tobacco Use: No change in amount of tobacco use since last session.  Patient declined discussion at this time.     Collateral Reports Completed:   Not Applicable    PLAN: (Client Tasks / Therapist Tasks / Other)  Continue to use the relaxtion skill as needed. \"Jam\" with his friends one time before the next scheduled individual therapy session.        Elissa Mckeon, M.S.W., L.KASSANDRA.C.S.W.                                                         ________________________________________________________________________    Treatment Plan    Client's Name: Marvin Alves  YOB: 1990    Date: 1/23/2020    DSM-V Diagnoses:     296.23 (F32.2) Major Depressive Disorder, Single Episode, Severe With Anxious Distress  300.02 (F41.1) Generalized Anxiety Disorder    Psychosocial / Contextual Factors: The client is a [age] year old, , single male who resides with his mother, his father and his older brother in a home in [place], [place]. The client is currently " "unemployed. The client reported that he began experiencing symptoms of anxiety approximately four years ago. He indicated that he began experiencing symptoms of depression shortly thereafter. The client reported the reason for seeking therapy at this time is his lack of interest or ability to function in life.     WHODAS: 52    Referral / Collaboration:  Referral to another professional/service is not indicated at this time.    Anticipated number of session or this episode of care: 12      MeasurableTreatment Goal(s) related to diagnosis / functional impairment(s)  Goal 1: The client will learn and apply skills to manage the symptoms of anxiety that he has been experiencing.    I will know I've met my goal when I can relax.      Objective #A     The client will identify specific fears / thoughts that contribute to feeling anxious.  Status: Continued - Date(s): 1/23/2020     Intervention(s)  Therapist will assist the client in identifying specific fears/thoughts that contribute to feeling anxious.    Objective #B  The client will use  worry time  each day for 30 minutes of scheduled worry and then defer obsessive or anxious thinking until the next structured \"worry time\".  Status: Continued - Date(s): 1/23/2020     Intervention(s)  Therapist will teach and support the client in learning and using \"worry time\".    Objective #C  The client will use relaxation strategies two times per day to reduce the physical symptoms of anxiety.  Status: Continued - Date(s): 1/23/2020    Intervention(s)  Therapist will teach and support the client in learning and using relaxation strategies.    Objective #D  The client will use cognitive strategies identified in therapy to challenge anxious thoughts.    Status: Continued - Date(s): 1/23/2020     Intervention(s)  Therapist will teach and support the client in using cognitive strategies to challenge anxious thoughts.    Objective #E  The client will be mindful of the body sensations " that he is experiencing when he is feeling anxious.  Status: Continued - Date(s): 1/23/2020     Intervention(s)  Therapist will  and support the client in being mindful of the body sensations that he is experiencing when he is feeling anxious.    Objective #F  The client will participate in EMDR treatment to reprocess traumatic memories.  Status: Continued - Date(s): 1/23/2020      Intervention(s)  Therapist will utilize EMDR treatment to facilitate the client's reprocessing of traumatic memories.      The client has reviewed and agreed to the above plan.      Elissa Mckeon M.S.W., L.I.C.S.W.  October 24, 2020

## 2020-03-05 ENCOUNTER — OFFICE VISIT (OUTPATIENT)
Dept: PSYCHOLOGY | Facility: CLINIC | Age: 30
End: 2020-03-05
Payer: COMMERCIAL

## 2020-03-05 DIAGNOSIS — F32.2 MAJOR DEPRESSIVE DISORDER, SINGLE EPISODE, SEVERE WITH ANXIOUS DISTRESS (H): ICD-10-CM

## 2020-03-05 DIAGNOSIS — F41.1 GENERALIZED ANXIETY DISORDER: Primary | ICD-10-CM

## 2020-03-05 PROCEDURE — 90832 PSYTX W PT 30 MINUTES: CPT | Performed by: SOCIAL WORKER

## 2020-03-06 NOTE — PROGRESS NOTES
"                                             Progress Note    Client Name: Marvin Alves  Date: 3/5/2020         Service Type: Individual      Session Start Time: 2:00 PM  Session End Time: 2:30 PM      Session Length: 30 Minutes     Session #: 28     Attendees: Client    Treatment Plan Last Reviewed: 1/23/2020  PHQ-9 / KWASI-7 : 25/21     DATA      Progress Since Last Session (Related to Symptoms / Goals / Homework):   Symptoms: Improving : The client reported feeling more energetic and more motivated since he has been spending more time with his friends.    Homework: Achieved / completed to satisfaction. The client reported that he has continued to use the relaxtion skill as needed. He indicated that he did \"Jam\" with his friends once since the previous individual therapy session.      Episode of Care Goals: Satisfactory progress - ACTION (Actively working towards change); Intervened by reinforcing change plan / affirming steps taken.     Current / Ongoing Stressors and Concerns:   The client reported that his stress continues to be low.      Treatment Objective(s) Addressed in This Session:   The client will use relaxation strategies two times per day to reduce the physical symptoms of anxiety.       Intervention:   Discussed and reinforced the client's efforts to use the relaxation strategy as needed. Talked at length about the client's efforts to spend more time with his friends and the resulting improvement in his mood. Obtained a commitment from the client to continue \"Jamming\" with his friends once per week.        ASSESSMENT: Current Emotional / Mental Status (status of significant symptoms):   Risk status (Self / Other harm or suicidal ideation)   Client denies current fears or concerns for personal safety.   Client denies current or recent suicidal ideation or behaviors.   Client denies current or recent homicidal ideation or behaviors.   Client denies current or recent self injurious behavior or " "ideation.   Client denies other safety concerns.   A safety and risk management plan has not been developed at this time, however client was given the after-hours number / 911 should there be a change in any of these risk factors.     Appearance:   Appropriate    Eye Contact:   Good    Psychomotor Behavior: Normal    Attitude:   Cooperative    Orientation:   All   Speech    Rate / Production: Normal     Volume:  Normal    Mood:    Normal   Affect:    Appropriate    Thought Content:  Clear    Thought Form:  Coherent  Logical    Insight:    Fair      Medication Review:   No current psychiatric medications prescribed.     Medication Compliance:   N/A     Changes in Health Issues:   None reported.     Chemical Use Review:   Substance Use: Chemical use reviewed. No active concerns identified.      Tobacco Use: No change in amount of tobacco use since last session.  Patient declined discussion at this time.     Collateral Reports Completed:   Not Applicable    PLAN: (Client Tasks / Therapist Tasks / Other)  Continue \"Jamming\" with his friends once per week.        SUKH Velasco., MILO.                                                         ________________________________________________________________________    Treatment Plan    Client's Name: Marvin Alves  YOB: 1990    Date: 1/23/2020    DSM-V Diagnoses:     296.23 (F32.2) Major Depressive Disorder, Single Episode, Severe With Anxious Distress  300.02 (F41.1) Generalized Anxiety Disorder    Psychosocial / Contextual Factors: The client is a [age] year old, , single male who resides with his mother, his father and his older brother in a home in [place], [place]. The client is currently unemployed. The client reported that he began experiencing symptoms of anxiety approximately four years ago. He indicated that he began experiencing symptoms of depression shortly thereafter. The client reported the reason for seeking " "therapy at this time is his lack of interest or ability to function in life.     WHODAS: 52    Referral / Collaboration:  Referral to another professional/service is not indicated at this time.    Anticipated number of session or this episode of care: 12      MeasurableTreatment Goal(s) related to diagnosis / functional impairment(s)  Goal 1: The client will learn and apply skills to manage the symptoms of anxiety that he has been experiencing.    I will know I've met my goal when I can relax.      Objective #A     The client will identify specific fears / thoughts that contribute to feeling anxious.  Status: Continued - Date(s): 1/23/2020     Intervention(s)  Therapist will assist the client in identifying specific fears/thoughts that contribute to feeling anxious.    Objective #B  The client will use  worry time  each day for 30 minutes of scheduled worry and then defer obsessive or anxious thinking until the next structured \"worry time\".  Status: Continued - Date(s): 1/23/2020     Intervention(s)  Therapist will teach and support the client in learning and using \"worry time\".    Objective #C  The client will use relaxation strategies two times per day to reduce the physical symptoms of anxiety.  Status: Continued - Date(s): 1/23/2020    Intervention(s)  Therapist will teach and support the client in learning and using relaxation strategies.    Objective #D  The client will use cognitive strategies identified in therapy to challenge anxious thoughts.    Status: Continued - Date(s): 1/23/2020     Intervention(s)  Therapist will teach and support the client in using cognitive strategies to challenge anxious thoughts.    Objective #E  The client will be mindful of the body sensations that he is experiencing when he is feeling anxious.  Status: Continued - Date(s): 1/23/2020     Intervention(s)  Therapist will  and support the client in being mindful of the body sensations that he is experiencing when he is " feeling anxious.    Objective #F  The client will participate in EMDR treatment to reprocess traumatic memories.  Status: Continued - Date(s): 1/23/2020      Intervention(s)  Therapist will utilize EMDR treatment to facilitate the client's reprocessing of traumatic memories.      The client has reviewed and agreed to the above plan.      MERCEDES Velasco.S.NEO., L.I.C.S.W.  January 24, 2020

## 2020-03-19 ENCOUNTER — VIRTUAL VISIT (OUTPATIENT)
Dept: PSYCHOLOGY | Facility: CLINIC | Age: 30
End: 2020-03-19
Payer: COMMERCIAL

## 2020-03-19 DIAGNOSIS — F32.2 MAJOR DEPRESSIVE DISORDER, SINGLE EPISODE, SEVERE WITH ANXIOUS DISTRESS (H): ICD-10-CM

## 2020-03-19 DIAGNOSIS — F41.1 GENERALIZED ANXIETY DISORDER: Primary | ICD-10-CM

## 2020-03-19 PROCEDURE — 98968 PH1 ASSMT&MGMT NQHP 21-30: CPT | Performed by: SOCIAL WORKER

## 2020-03-20 NOTE — PROGRESS NOTES
"                                             Progress Note    Client Name: Marvin Alves  Date: 3/19/2020         Service Type: Phone Visit      Session Start Time: 2:30 PM  Session End Time: 3:00 PM      Session Length: 30 Minutes     Session #: 29     Attendees: Client    The client has been notified of the following:    \"We have found that certain health care needs can be provided without the need for a face to face visit. This service lets us provide the care you need with a phone conversation. I will have full access to your [facility] medical record during this entire phone call. I will be taking notes for your medical record. Since this is like an office visit, we will bill your insurance company for this service. There are potential benefits and risks of telephone visits (e.g. limits to patient confidentiality) that differ from in-person visits.?Confidentiality still applies for telephone services, and nobody will record the visit. It is important to be in a quiet, private space that is free from distractions (including cell phone or other devices) during the visit.?If during the course of the call, I believe a telephone visit is not appropriate, you will not be charged for this service\"    Consent has been obtained for this service by the provider: Yes    Treatment Plan Last Reviewed: 1/23/2020  PHQ-9 / KWASI-7 : 25/21     DATA      Progress Since Last Session (Related to Symptoms / Goals / Homework):   Symptoms: Improving : The client reported continuing to feel more energetic and more motivated. He stated that he has been feeling slightly more anxious since he quit smoking three days ago.    Homework: Achieved / completed to satisfaction. The client reported that he did \"Jam\" with one of his friends since the previous individual therapy session.      Episode of Care Goals: Satisfactory progress - ACTION (Actively working towards change); Intervened by reinforcing change plan / affirming steps " taken.     Current / Ongoing Stressors and Concerns:   The client reported that his stress continues to be low. He indicated that he quit smoking three days ago.     Treatment Objective(s) Addressed in This Session:   The client will use relaxation strategies two times per day to reduce the physical symptoms of anxiety.       Intervention:   Discussed and reinforced the client's efforts to spend more time with his friends and the resulting improvement in his mood. Talked about his decision to quit smoking and the resulting increased anxiety. Identified hypnotic/relaxation skills the client could use to manage his anxiety and cravings. Obtained a commitment from the client to use the hypnotic/relaxation skills.        ASSESSMENT: Current Emotional / Mental Status (status of significant symptoms):   Risk status (Self / Other harm or suicidal ideation)   Client denies current fears or concerns for personal safety.   Client denies current or recent suicidal ideation or behaviors.   Client denies current or recent homicidal ideation or behaviors.   Client denies current or recent self injurious behavior or ideation.   Client denies other safety concerns.   A safety and risk management plan has not been developed at this time, however the client was directed to call 911 should there be a change in any of these risk factors.     Attitude:   Cooperative    Orientation:   All   Speech    Rate / Production: Normal     Volume:  Normal    Mood:    Slightly Anxious    Thought Content:  Clear    Thought Form:  Coherent  Logical    Insight:    Fair      Medication Review:   No current psychiatric medications prescribed.     Medication Compliance:   N/A     Changes in Health Issues:   None reported.     Chemical Use Review:   Substance Use: Chemical use reviewed. No active concerns identified.      Tobacco Use: No current tobacco use.       Collateral Reports Completed:   Not Applicable    PLAN: (Client Tasks / Therapist Tasks /  Other)  Use the hypnotic/relaxation skills to manage his anxiety and cravings for a cigarette.        I have reviewed the note as documented above. This accurately captures the substance of my conversation with the client.  Elissa Mckeon M.S.W., L.I.C.S.W.

## 2020-04-17 ENCOUNTER — TELEPHONE (OUTPATIENT)
Dept: PSYCHOLOGY | Facility: CLINIC | Age: 30
End: 2020-04-17

## 2020-04-22 ENCOUNTER — VIRTUAL VISIT (OUTPATIENT)
Dept: PSYCHOLOGY | Facility: CLINIC | Age: 30
End: 2020-04-22
Payer: COMMERCIAL

## 2020-04-22 DIAGNOSIS — F41.1 GENERALIZED ANXIETY DISORDER: Primary | ICD-10-CM

## 2020-04-22 DIAGNOSIS — F32.2 MAJOR DEPRESSIVE DISORDER, SINGLE EPISODE, SEVERE WITH ANXIOUS DISTRESS (H): ICD-10-CM

## 2020-04-22 PROCEDURE — 90834 PSYTX W PT 45 MINUTES: CPT | Mod: 95 | Performed by: SOCIAL WORKER

## 2020-04-25 NOTE — PROGRESS NOTES
"                                             Progress Note    Client Name: Marvin Alves  Date: 4/22/2020         Service Type: Phone Visit (The client declined a video visit. He would prefer a phone visit.)      Session Start Time: 2:30 PM  Session End Time: 3:20 PM      Session Length: 50 Minutes     Session #: 30     Attendees: Client    The client has been notified of the following:     \"We have found that certain health care needs can be provided without the need for a face to face visit. This service lets us provide the care you need with a phone conversation. I will have full access to your [facility] medical record during this entire phone call. I will be taking notes for your medical record. Since this is like an office visit, we will bill your insurance company for this service. There are potential benefits and risks of telephone visits (e.g. limits to patient confidentiality) that differ from in-person visits.?Confidentiality still applies for telephone services, and nobody will record the visit. It is important to be in a quiet, private space that is free from distractions (including cell phone or other devices) during the visit.?If during the course of the call, I believe a telephone visit is not appropriate, you will not be charged for this service\".     Consent has been obtained for this service by the provider: Yes    Treatment Plan Last Reviewed: 1/23/2020  PHQ-9 / KWASI-7 : 25/21     DATA      Progress Since Last Session (Related to Symptoms / Goals / Homework):   Symptoms: Improving : The client reported continuing to feel more energetic and more motivated. He stated that he is also feeling less anxious.    Homework: Did not complete. The client reported that he did not use the hypnotic/relaxation skills to manage his anxiety and cravings for a cigarette.       Episode of Care Goals: Satisfactory progress - ACTION (Actively working towards change); Intervened by reinforcing change plan / affirming " steps taken.     Current / Ongoing Stressors and Concerns:   The client reported that his stress continues to be low.      Treatment Objective(s) Addressed in This Session:   The client will use relaxation strategies two times per day to reduce the physical symptoms of anxiety.       Intervention:   Discussed and reinforced the client's efforts to avoid acting on urges to smoke. Examined his reduced anxiety. Talked about his increased desire to socialize. Identified ways he could socialize online with his friends. Obtained a commitment from the client to socialize online with his friends daily.        ASSESSMENT: Current Emotional / Mental Status (status of significant symptoms):   Risk status (Self / Other harm or suicidal ideation)   Client denies current fears or concerns for personal safety.   Client denies current or recent suicidal ideation or behaviors.   Client denies current or recent homicidal ideation or behaviors.   Client denies current or recent self injurious behavior or ideation.   Client denies other safety concerns.   A safety and risk management plan has not been developed at this time, however the client was directed to call 911 should there be a change in any of these risk factors.     Attitude:   Cooperative    Orientation:   All   Speech    Rate / Production: Normal     Volume:  Normal    Mood:    Normal   Thought Content:  Clear    Thought Form:  Coherent  Logical    Insight:    Good      Medication Review:   No current psychiatric medications prescribed.     Medication Compliance:   N/A     Changes in Health Issues:   None reported.     Chemical Use Review:   Substance Use: Chemical use reviewed. No active concerns identified.      Tobacco Use: No current tobacco use.       Collateral Reports Completed:   Not Applicable    PLAN: (Client Tasks / Therapist Tasks / Other)  Socialize online with his friends daily.        I have reviewed the note as documented above. This accurately captures the  "substance of my conversation with the client.  SUKH Velasco., MILO.                                                         ________________________________________________________________________    Treatment Plan    Client's Name: Marvin Alves  YOB: 1990    Date: 1/23/2020    DSM-V Diagnoses:     296.23 (F32.2) Major Depressive Disorder, Single Episode, Severe With Anxious Distress  300.02 (F41.1) Generalized Anxiety Disorder    Psychosocial / Contextual Factors: The client is a [age] year old, , single male who resides with his mother, his father and his older brother in a home in [place], [place]. The client is currently unemployed. The client reported that he began experiencing symptoms of anxiety approximately four years ago. He indicated that he began experiencing symptoms of depression shortly thereafter. The client reported the reason for seeking therapy at this time is his lack of interest or ability to function in life.     WHODAS: 52    Referral / Collaboration:  Referral to another professional/service is not indicated at this time.    Anticipated number of session or this episode of care: 12      MeasurableTreatment Goal(s) related to diagnosis / functional impairment(s)  Goal 1: The client will learn and apply skills to manage the symptoms of anxiety that he has been experiencing.    I will know I've met my goal when I can relax.      Objective #A     The client will identify specific fears / thoughts that contribute to feeling anxious.  Status: Continued - Date(s): 1/23/2020     Intervention(s)  Therapist will assist the client in identifying specific fears/thoughts that contribute to feeling anxious.    Objective #B  The client will use  worry time  each day for 30 minutes of scheduled worry and then defer obsessive or anxious thinking until the next structured \"worry time\".  Status: Continued - Date(s): 1/23/2020     Intervention(s)  Therapist will " "teach and support the client in learning and using \"worry time\".    Objective #C  The client will use relaxation strategies two times per day to reduce the physical symptoms of anxiety.  Status: Continued - Date(s): 1/23/2020    Intervention(s)  Therapist will teach and support the client in learning and using relaxation strategies.    Objective #D  The client will use cognitive strategies identified in therapy to challenge anxious thoughts.    Status: Continued - Date(s): 1/23/2020     Intervention(s)  Therapist will teach and support the client in using cognitive strategies to challenge anxious thoughts.    Objective #E  The client will be mindful of the body sensations that he is experiencing when he is feeling anxious.  Status: Continued - Date(s): 1/23/2020     Intervention(s)  Therapist will  and support the client in being mindful of the body sensations that he is experiencing when he is feeling anxious.    Objective #F  The client will participate in EMDR treatment to reprocess traumatic memories.  Status: Continued - Date(s): 1/23/2020      Intervention(s)  Therapist will utilize EMDR treatment to facilitate the client's reprocessing of traumatic memories.      The client has reviewed and agreed to the above plan.      MERCEDES Velasco.S.W., L.I.C.S.W.  January 24, 2020  "

## 2020-06-15 ENCOUNTER — VIRTUAL VISIT (OUTPATIENT)
Dept: PSYCHOLOGY | Facility: CLINIC | Age: 30
End: 2020-06-15
Payer: COMMERCIAL

## 2020-06-15 DIAGNOSIS — F32.2 MAJOR DEPRESSIVE DISORDER, SINGLE EPISODE, SEVERE WITH ANXIOUS DISTRESS (H): ICD-10-CM

## 2020-06-15 DIAGNOSIS — F41.1 GENERALIZED ANXIETY DISORDER: Primary | ICD-10-CM

## 2020-06-15 PROCEDURE — 90834 PSYTX W PT 45 MINUTES: CPT | Mod: 95 | Performed by: SOCIAL WORKER

## 2020-06-16 NOTE — PROGRESS NOTES
"                                             Progress Note    Client Name: Marvin Alves  Date: 6/15/2020         Service Type: Phone Visit (The client declined a video visit. He would prefer a phone visit.)      Session Start Time: 5:00 PM  Session End Time: 5:50 PM      Session Length: 50 Minutes     Session #: 31     Attendees: Client    The client has been notified of the following:     \"We have found that certain health care needs can be provided without the need for a face to face visit. This service lets us provide the care you need with a phone conversation. I will have full access to your [facility] medical record during this entire phone call. I will be taking notes for your medical record. Since this is like an office visit, we will bill your insurance company for this service. There are potential benefits and risks of telephone visits (e.g. limits to patient confidentiality) that differ from in-person visits.?Confidentiality still applies for telephone services, and nobody will record the visit. It is important to be in a quiet, private space that is free from distractions (including cell phone or other devices) during the visit.?If during the course of the call, I believe a telephone visit is not appropriate, you will not be charged for this service\".     Consent has been obtained for this service by the provider: Yes    Treatment Plan Last Reviewed: 6/15/2020  PHQ-9 / KWASI-7 : 25/21     DATA      Progress Since Last Session (Related to Symptoms / Goals / Homework):   Symptoms: No change : The client reported no change in his symptoms since the previous phone visit.    Homework: Achieved / completed to satisfaction. The client reported that he has socialized online with his friends daily.       Episode of Care Goals: No improvement - ACTION (Actively working towards change); Intervened by reinforcing change plan / affirming steps taken.     Current / Ongoing Stressors and Concerns:   The client reported " that his stress continues to be low.      Treatment Objective(s) Addressed in This Session:   The client will participate in the review and revision of the individual treatment plan.      Intervention:   Discussed and reinforced the client's efforts to socialize with his friend's online daily. Obtained a commitment from the client to continue to socialize with his friend's online daily. Talked about the client's recent stressors. Reviewed and revised the individual treatment plan.        ASSESSMENT: Current Emotional / Mental Status (status of significant symptoms):   Risk status (Self / Other harm or suicidal ideation)   Client denies current fears or concerns for personal safety.   Client denies current or recent suicidal ideation or behaviors.   Client denies current or recent homicidal ideation or behaviors.   Client denies current or recent self injurious behavior or ideation.   Client denies other safety concerns.   A safety and risk management plan has not been developed at this time, however the client was directed to call 911 should there be a change in any of these risk factors.     Attitude:   Cooperative    Orientation:   All   Speech    Rate / Production: Normal     Volume:  Normal    Mood:    Normal   Thought Content:  Clear    Thought Form:  Coherent  Logical    Insight:    Good      Medication Review:   No current psychiatric medications prescribed.     Medication Compliance:   N/A     Changes in Health Issues:   None reported.     Chemical Use Review:   Substance Use: Chemical use reviewed. No active concerns identified.      Tobacco Use: No current tobacco use.        Diagnosis:   1. Generalized anxiety disorder   2. Major depressive disorder, single episode, severe with anxious distress     Collateral Reports Completed:   Not Applicable    PLAN: (Client Tasks / Therapist Tasks / Other)  Continue to socialize online with his friends daily.        I have reviewed the note as documented above. This  "accurately captures the substance of my conversation with the client.  SUKH Velasco., MILO.                                                         ________________________________________________________________________    Treatment Plan    Client's Name: Marvin Alves  YOB: 1990    Date: 6/15/2020    DSM-V Diagnoses:     296.23 (F32.2) Major Depressive Disorder, Single Episode, Severe With Anxious Distress  300.02 (F41.1) Generalized Anxiety Disorder    Psychosocial / Contextual Factors: The client is a [age] year old, , single male who resides with his mother and his father in a home in [place], [place]. The client is currently unemployed. The client reported that he began experiencing symptoms of anxiety approximately four years ago. He indicated that he began experiencing symptoms of depression shortly thereafter. The client reported the reason for seeking therapy at this time is his lack of interest or ability to function in life.     WHODAS: 52    Referral / Collaboration:  Referral to another professional/service is not indicated at this time.    Anticipated number of session or this episode of care: 12    MeasurableTreatment Goal(s) related to diagnosis / functional impairment(s)  Goal 1: The client will learn and apply skills to manage the symptoms of anxiety that he has been experiencing.    I will know I've met my goal when I can relax.      Objective #A     The client will identify specific fears / thoughts that contribute to feeling anxious.  Status: Continued - Date(s): 6/15/2020     Intervention(s)  Therapist will assist the client in identifying specific fears/thoughts that contribute to feeling anxious.    Objective #B  The client will use  worry time  each day for 30 minutes of scheduled worry and then defer obsessive or anxious thinking until the next structured \"worry time\".  Status: Continued - Date(s): 6/15/2020     Intervention(s)  Therapist will " "teach and support the client in learning and using \"worry time\".    Objective #C  The client will use relaxation strategies two times per day to reduce the physical symptoms of anxiety.  Status: Continued - Date(s): 6/15/2020    Intervention(s)  Therapist will teach and support the client in learning and using relaxation strategies.    Objective #D  The client will use cognitive strategies identified in therapy to challenge anxious thoughts.    Status: Continued - Date(s): 6/15/2020     Intervention(s)  Therapist will teach and support the client in using cognitive strategies to challenge anxious thoughts.    Objective #E  The client will be mindful of the body sensations that he is experiencing when he is feeling anxious.  Status: Continued - Date(s): 6/15/2020     Intervention(s)  Therapist will  and support the client in being mindful of the body sensations that he is experiencing when he is feeling anxious.    Objective #F  The client will participate in EMDR treatment to reprocess traumatic memories.  Status: Continued - Date(s): 6/15/2020      Intervention(s)  Therapist will utilize EMDR treatment to facilitate the client's reprocessing of traumatic memories.      The client has reviewed and verbally agreed to the above plan.      Elissa Mckeon M.S.W., L.I.C.S.W.  June 16, 2020  "

## 2020-07-15 ENCOUNTER — VIRTUAL VISIT (OUTPATIENT)
Dept: PSYCHOLOGY | Facility: CLINIC | Age: 30
End: 2020-07-15
Payer: COMMERCIAL

## 2020-07-15 DIAGNOSIS — F41.1 GENERALIZED ANXIETY DISORDER: Primary | ICD-10-CM

## 2020-07-15 DIAGNOSIS — F32.2 MAJOR DEPRESSIVE DISORDER, SINGLE EPISODE, SEVERE WITH ANXIOUS DISTRESS (H): ICD-10-CM

## 2020-07-15 PROCEDURE — 90834 PSYTX W PT 45 MINUTES: CPT | Mod: 95 | Performed by: SOCIAL WORKER

## 2020-07-17 NOTE — PROGRESS NOTES
"                                             Progress Note    Client Name: Marvin Alves  Date: 7/15/2020      Service Type: Phone Visit (The client declined a video visit. He would prefer a phone visit.)      Session Start Time: 3:30 PM  Session End Time: 4:15 PM      Session Length: 45 Minutes     Session #: 32     Attendees: Client    The client has been notified of the following:     \"We have found that certain health care needs can be provided without the need for a face to face visit. This service lets us provide the care you need with a phone conversation. I will have full access to your [facility] medical record during this entire phone call. I will be taking notes for your medical record. Since this is like an office visit, we will bill your insurance company for this service. There are potential benefits and risks of telephone visits (e.g. limits to patient confidentiality) that differ from in-person visits.?Confidentiality still applies for telephone services, and nobody will record the visit. It is important to be in a quiet, private space that is free from distractions (including cell phone or other devices) during the visit.?If during the course of the call, I believe a telephone visit is not appropriate, you will not be charged for this service\".     Consent has been obtained for this service by the provider: Yes    Treatment Plan Last Reviewed: 6/15/2020  PHQ-9 / KWASI-7 : 25/21     DATA      Progress Since Last Session (Related to Symptoms / Goals / Homework):   Symptoms: No change : The client reported no change in his symptoms since the previous phone visit.    Homework: Achieved / completed to satisfaction. The client reported that he has socialized online with his friends daily.       Episode of Care Goals: No improvement - ACTION (Actively working towards change); Intervened by reinforcing change plan / affirming steps taken.     Current / Ongoing Stressors and Concerns:   The client reported that " "his stress continues to be low. 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     Treatment Objective(s) Addressed in This Session:   The client will use  worry time  each day for 30 minutes of scheduled worry and then defer obsessive or anxious thinking until the next structured \"worry time\".      Intervention:   Discussed and reinforced the client's efforts to socialize with his friend's online daily. Obtained a commitment from the client to continue to socialize with his friend's online daily. Examined the client's worry thoughts about one of his friends and about his brother. Obtained a commitment from the client to re-implement use of \"worry time\" as needed.        ASSESSMENT: Current Emotional / Mental Status (status of significant symptoms):   Risk status (Self / Other harm or suicidal ideation)   Client denies current fears or concerns for personal safety.   Client denies current or recent suicidal ideation or behaviors.   Client denies current or recent homicidal ideation or behaviors.   Client denies current or recent self injurious behavior or ideation.   Client denies other safety concerns.   A safety and risk management plan has not been developed at this time, however the client was directed to call 911 should there be a change in any of these risk factors.     Attitude:   Cooperative    Orientation:   All   Speech    Rate / Production: Normal     Volume:  Normal    Mood:    Normal   Thought Content:  Clear    Thought Form:  Coherent  Logical    Insight:    Good      Medication Review:   No current psychiatric medications prescribed.     Medication Compliance:   N/A     Changes in Health Issues:   None reported.     Chemical Use Review:   Substance Use: Chemical use reviewed. No active concerns identified.      Tobacco Use: No current tobacco use.        Diagnosis:   1. Generalized anxiety disorder   2. Major depressive disorder, single episode, severe with " "anxious distress     Collateral Reports Completed:   Not Applicable    PLAN: (Client Tasks / Therapist Tasks / Other)  Continue to socialize online with his friends daily. Re-implement \"worry time\" as needed.        I have reviewed the note as documented above. This accurately captures the substance of my conversation with the client.  SUKH Velasco., MILO.                                                         ________________________________________________________________________    Treatment Plan    Client's Name: Marvin Alves  YOB: 1990    Date: 6/15/2020    DSM-V Diagnoses:     296.23 (F32.2) Major Depressive Disorder, Single Episode, Severe With Anxious Distress  300.02 (F41.1) Generalized Anxiety Disorder    Psychosocial / Contextual Factors: The client is a [age] year old, , single male who resides with his mother and his father in a home in [place], [place]. The client is currently unemployed. The client reported that he began experiencing symptoms of anxiety approximately four years ago. He indicated that he began experiencing symptoms of depression shortly thereafter. The client reported the reason for seeking therapy at this time is his lack of interest or ability to function in life.     WHODAS: 52    Referral / Collaboration:  Referral to another professional/service is not indicated at this time.    Anticipated number of session or this episode of care: 12    MeasurableTreatment Goal(s) related to diagnosis / functional impairment(s)  Goal 1: The client will learn and apply skills to manage the symptoms of anxiety that he has been experiencing.    I will know I've met my goal when I can relax.      Objective #A     The client will identify specific fears / thoughts that contribute to feeling anxious.  Status: Continued - Date(s): 6/15/2020     Intervention(s)  Therapist will assist the client in identifying specific fears/thoughts that contribute to " "feeling anxious.    Objective #B  The client will use  worry time  each day for 30 minutes of scheduled worry and then defer obsessive or anxious thinking until the next structured \"worry time\".  Status: Continued - Date(s): 6/15/2020     Intervention(s)  Therapist will teach and support the client in learning and using \"worry time\".    Objective #C  The client will use relaxation strategies two times per day to reduce the physical symptoms of anxiety.  Status: Continued - Date(s): 6/15/2020    Intervention(s)  Therapist will teach and support the client in learning and using relaxation strategies.    Objective #D  The client will use cognitive strategies identified in therapy to challenge anxious thoughts.    Status: Continued - Date(s): 6/15/2020     Intervention(s)  Therapist will teach and support the client in using cognitive strategies to challenge anxious thoughts.    Objective #E  The client will be mindful of the body sensations that he is experiencing when he is feeling anxious.  Status: Continued - Date(s): 6/15/2020     Intervention(s)  Therapist will  and support the client in being mindful of the body sensations that he is experiencing when he is feeling anxious.    Objective #F  The client will participate in EMDR treatment to reprocess traumatic memories.  Status: Continued - Date(s): 6/15/2020      Intervention(s)  Therapist will utilize EMDR treatment to facilitate the client's reprocessing of traumatic memories.      The client has reviewed and verbally agreed to the above plan.      MERCEDES Velasco.S.NEO., L.I.C.S.W.  June 16, 2020  "

## 2020-08-12 ENCOUNTER — VIRTUAL VISIT (OUTPATIENT)
Dept: PSYCHOLOGY | Facility: CLINIC | Age: 30
End: 2020-08-12
Payer: COMMERCIAL

## 2020-08-12 DIAGNOSIS — F41.1 GENERALIZED ANXIETY DISORDER: Primary | ICD-10-CM

## 2020-08-12 DIAGNOSIS — F32.2 MAJOR DEPRESSIVE DISORDER, SINGLE EPISODE, SEVERE WITH ANXIOUS DISTRESS (H): ICD-10-CM

## 2020-08-12 PROCEDURE — 90834 PSYTX W PT 45 MINUTES: CPT | Mod: 95 | Performed by: SOCIAL WORKER

## 2020-08-13 NOTE — PROGRESS NOTES
"                                             Progress Note    Client Name: Marvin Alves  Date: 8/12/2020      Service Type: Phone Visit (The client declined a video visit. He would prefer a phone visit.)      Session Start Time: 3:30 PM  Session End Time: 4:15 PM      Session Length: 45 Minutes     Session #: 34     Attendees: Client    The client has been notified of the following:     \"We have found that certain health care needs can be provided without the need for a face to face visit. This service lets us provide the care you need with a phone conversation. I will have full access to your [facility] medical record during this entire phone call. I will be taking notes for your medical record. Since this is like an office visit, we will bill your insurance company for this service. There are potential benefits and risks of telephone visits (e.g. limits to patient confidentiality) that differ from in-person visits.?Confidentiality still applies for telephone services, and nobody will record the visit. It is important to be in a quiet, private space that is free from distractions (including cell phone or other devices) during the visit.?If during the course of the call, I believe a telephone visit is not appropriate, you will not be charged for this service\".     Consent has been obtained for this service by the provider: Yes    Treatment Plan Last Reviewed: 6/15/2020  PHQ-9 / KWASI-7 : 25/21     DATA      Progress Since Last Session (Related to Symptoms / Goals / Homework):   Symptoms: No change : The client reported no change in his symptoms since the previous phone visit.    Homework: Partially completed. The client reported that he has socialized online with his friends daily. He indicated that he did not re-implement \"worry time\".      Episode of Care Goals: No improvement - ACTION (Actively working towards change); Intervened by reinforcing change plan / affirming steps taken.     Current / Ongoing Stressors " "and Concerns:   The client reported that his stress continues to be low. 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     Treatment Objective(s) Addressed in This Session:   The client will use  worry time  each day for 30 minutes of scheduled worry and then defer obsessive or anxious thinking until the next structured \"worry time\".      Intervention:   Discussed and reinforced the client's efforts to socialize with his friend's online daily. Obtained a commitment from the client to continue to socialize with his friend's online daily. Discussed the client's ongoing worry thoughts about one of his friends. Examined and addressed the barriers to re-implementing the use of \"worry time\". Obtained a commitment from the client to re-implement use of \"worry time\" as needed.        ASSESSMENT: Current Emotional / Mental Status (status of significant symptoms):   Risk status (Self / Other harm or suicidal ideation)   Client denies current fears or concerns for personal safety.   Client denies current or recent suicidal ideation or behaviors.   Client denies current or recent homicidal ideation or behaviors.   Client denies current or recent self injurious behavior or ideation.   Client denies other safety concerns.   A safety and risk management plan has not been developed at this time, however the client was directed to call 911 should there be a change in any of these risk factors.     Attitude:   Cooperative    Orientation:   All   Speech    Rate / Production: Normal     Volume:  Normal    Mood:    Normal   Thought Content:  Clear    Thought Form:  Coherent  Logical    Insight:    Good      Medication Review:   No current psychiatric medications prescribed.     Medication Compliance:   N/A     Changes in Health Issues:   None reported.     Chemical Use Review:   Substance Use: Chemical use reviewed. No active concerns identified.      Tobacco Use: No current tobacco use.   " "     Diagnosis:   1. Generalized anxiety disorder   2. Major depressive disorder, single episode, severe with anxious distress     Collateral Reports Completed:   Not Applicable    PLAN: (Client Tasks / Therapist Tasks / Other)  Continue to socialize online with his friends daily. Re-implement \"worry time\" as needed.        I have reviewed the note as documented above. This accurately captures the substance of my conversation with the client.  SUKH Velasco., MILO.                                                         ________________________________________________________________________    Treatment Plan    Client's Name: Marvin Alves  YOB: 1990    Date: 6/15/2020    DSM-V Diagnoses:     296.23 (F32.2) Major Depressive Disorder, Single Episode, Severe With Anxious Distress  300.02 (F41.1) Generalized Anxiety Disorder    Psychosocial / Contextual Factors: The client is a [age] year old, , single male who resides with his mother and his father in a home in [place], [place]. The client is currently unemployed. The client reported that he began experiencing symptoms of anxiety approximately four years ago. He indicated that he began experiencing symptoms of depression shortly thereafter. The client reported the reason for seeking therapy at this time is his lack of interest or ability to function in life.     WHODAS: 52    Referral / Collaboration:  Referral to another professional/service is not indicated at this time.    Anticipated number of session or this episode of care: 12    MeasurableTreatment Goal(s) related to diagnosis / functional impairment(s)  Goal 1: The client will learn and apply skills to manage the symptoms of anxiety that he has been experiencing.    I will know I've met my goal when I can relax.      Objective #A     The client will identify specific fears / thoughts that contribute to feeling anxious.  Status: Continued - Date(s): 6/15/2020 " "    Intervention(s)  Therapist will assist the client in identifying specific fears/thoughts that contribute to feeling anxious.    Objective #B  The client will use  worry time  each day for 30 minutes of scheduled worry and then defer obsessive or anxious thinking until the next structured \"worry time\".  Status: Continued - Date(s): 6/15/2020     Intervention(s)  Therapist will teach and support the client in learning and using \"worry time\".    Objective #C  The client will use relaxation strategies two times per day to reduce the physical symptoms of anxiety.  Status: Continued - Date(s): 6/15/2020    Intervention(s)  Therapist will teach and support the client in learning and using relaxation strategies.    Objective #D  The client will use cognitive strategies identified in therapy to challenge anxious thoughts.    Status: Continued - Date(s): 6/15/2020     Intervention(s)  Therapist will teach and support the client in using cognitive strategies to challenge anxious thoughts.    Objective #E  The client will be mindful of the body sensations that he is experiencing when he is feeling anxious.  Status: Continued - Date(s): 6/15/2020     Intervention(s)  Therapist will  and support the client in being mindful of the body sensations that he is experiencing when he is feeling anxious.    Objective #F  The client will participate in EMDR treatment to reprocess traumatic memories.  Status: Continued - Date(s): 6/15/2020      Intervention(s)  Therapist will utilize EMDR treatment to facilitate the client's reprocessing of traumatic memories.      The client has reviewed and verbally agreed to the above plan.      Elissa Mckeon M.S.NEO., L.I.C.S.W.  June 16, 2020  "

## 2020-09-16 ENCOUNTER — VIRTUAL VISIT (OUTPATIENT)
Dept: PSYCHOLOGY | Facility: CLINIC | Age: 30
End: 2020-09-16
Payer: COMMERCIAL

## 2020-09-16 DIAGNOSIS — F32.2 MAJOR DEPRESSIVE DISORDER, SINGLE EPISODE, SEVERE WITH ANXIOUS DISTRESS (H): ICD-10-CM

## 2020-09-16 DIAGNOSIS — F41.1 GENERALIZED ANXIETY DISORDER: Primary | ICD-10-CM

## 2020-09-16 PROCEDURE — 90834 PSYTX W PT 45 MINUTES: CPT | Mod: 95 | Performed by: SOCIAL WORKER

## 2020-09-17 NOTE — PROGRESS NOTES
"                                             Progress Note    Client Name: Marvin Alves  Date: 9/16/2020      Service Type: Phone Visit (The client declined a video visit. He would prefer a phone visit.)      Session Start Time: 2:30 PM  Session End Time: 3:20 PM      Session Length: 50 Minutes     Session #: 35     Attendees: Client    The client has been notified of the following:     \"We have found that certain health care needs can be provided without the need for a face to face visit. This service lets us provide the care you need with a phone conversation. I will have full access to your [facility] medical record during this entire phone call. I will be taking notes for your medical record. Since this is like an office visit, we will bill your insurance company for this service. There are potential benefits and risks of telephone visits (e.g. limits to patient confidentiality) that differ from in-person visits.?Confidentiality still applies for telephone services, and nobody will record the visit. It is important to be in a quiet, private space that is free from distractions (including cell phone or other devices) during the visit.?If during the course of the call, I believe a telephone visit is not appropriate, you will not be charged for this service\".     Consent has been obtained for this service by the provider: Yes    Treatment Plan Last Reviewed: 9/16/2020  PHQ-9 / KWASI-7 : 25/21     DATA      Progress Since Last Session (Related to Symptoms / Goals / Homework):   Symptoms: No change : The client reported no change in his symptoms since the previous phone visit.    Homework: Partially completed. The client reported that he has socialized online with his friends daily. He indicated that he did not re-implement \"worry time\".      Episode of Care Goals: No improvement - ACTION (Actively working towards change); Intervened by reinforcing change plan / affirming steps taken.     Current / Ongoing Stressors " "and Concerns:   The client reported that his stress continues to be low.      Treatment Objective(s) Addressed in This Session:   The client will use  worry time  each day for 30 minutes of scheduled worry and then defer obsessive or anxious thinking until the next structured \"worry time\". The client will participate in the review and revision of the individual treatment plan.     Intervention:   Discussed and reinforced the client's efforts to socialize with his friend's online daily. Obtained a commitment from the client to continue to socialize with his friend's online daily. Discussed the client's ongoing worry thoughts about one of his friends. Re-examined and addressed the barriers to re-implementing the use of \"worry time\". Obtained a commitment from the client to re-implement use of \"worry time\" as needed. Reviewed and revised the individual treatment plan.        ASSESSMENT: Current Emotional / Mental Status (status of significant symptoms):   Risk status (Self / Other harm or suicidal ideation)   Client denies current fears or concerns for personal safety.   Client denies current or recent suicidal ideation or behaviors.   Client denies current or recent homicidal ideation or behaviors.   Client denies current or recent self injurious behavior or ideation.   Client denies other safety concerns.   A safety and risk management plan has not been developed at this time, however the client was directed to call 911 should there be a change in any of these risk factors.     Attitude:   Cooperative    Orientation:   All   Speech    Rate / Production: Normal     Volume:  Normal    Mood:    Normal   Thought Content:  Clear    Thought Form:  Coherent  Logical    Insight:    Good      Medication Review:   No current psychiatric medications prescribed.     Medication Compliance:   N/A     Changes in Health Issues:   None reported.     Chemical Use Review:   Substance Use: Chemical use reviewed. No active concerns " "identified.      Tobacco Use: No current tobacco use.        Diagnosis:   1. Generalized anxiety disorder   2. Major depressive disorder, single episode, severe with anxious distress     Collateral Reports Completed:   Not Applicable    PLAN: (Client Tasks / Therapist Tasks / Other)  Continue to socialize online with his friends daily. Re-implement \"worry time\" as needed.        I have reviewed the note as documented above. This accurately captures the substance of my conversation with the client.  MERCEDES Velasco.S.NEO., MILO.                                                         ________________________________________________________________________    Treatment Plan    Client's Name: Marvin Alves  YOB: 1990    Date: 9/16/2020    DSM-V Diagnoses:     296.23 (F32.2) Major Depressive Disorder, Single Episode, Severe With Anxious Distress  300.02 (F41.1) Generalized Anxiety Disorder    Psychosocial / Contextual Factors: The client is a [age] year old, , single male who resides with his mother and his father in a home in [place], [place]. The client is currently unemployed. The client reported that he began experiencing symptoms of anxiety approximately four years ago. He indicated that he began experiencing symptoms of depression shortly thereafter. The client reported the reason for seeking therapy at this time is his lack of interest or ability to function in life.     WHODAS: 52    Referral / Collaboration:  Referral to another professional/service is not indicated at this time.    Anticipated number of session or this episode of care: 12    MeasurableTreatment Goal(s) related to diagnosis / functional impairment(s)  Goal 1: The client will learn and apply skills to manage the symptoms of anxiety that he has been experiencing.    I will know I've met my goal when I can relax.      Objective #A     The client will identify specific fears / thoughts that contribute to feeling " "anxious.  Status: Continued - Date(s): 9/16/2020     Intervention(s)  Therapist will assist the client in identifying specific fears/thoughts that contribute to feeling anxious.    Objective #B  The client will use  worry time  each day for 30 minutes of scheduled worry and then defer obsessive or anxious thinking until the next structured \"worry time\".  Status: Continued - Date(s): 9/16/2020     Intervention(s)  Therapist will teach and support the client in learning and using \"worry time\".    Objective #C  The client will use relaxation strategies two times per day to reduce the physical symptoms of anxiety.  Status: Continued - Date(s): 9/16/2020    Intervention(s)  Therapist will teach and support the client in learning and using relaxation strategies.    Objective #D  The client will use cognitive strategies identified in therapy to challenge anxious thoughts.    Status: Continued - Date(s): 9/16/2020     Intervention(s)  Therapist will teach and support the client in using cognitive strategies to challenge anxious thoughts.    Objective #E  The client will be mindful of the body sensations that he is experiencing when he is feeling anxious.  Status: Continued - Date(s): 9/16/2020     Intervention(s)  Therapist will  and support the client in being mindful of the body sensations that he is experiencing when he is feeling anxious.    Objective #F  The client will participate in EMDR treatment to reprocess traumatic memories.  Status: Continued - Date(s): 9/16/2020      Intervention(s)  Therapist will utilize EMDR treatment to facilitate the client's reprocessing of traumatic memories.      The client has reviewed and verbally agreed to the above plan.      Elissa Mckeon, M.S.W., L.I.C.S.W.  September 17, 2020  "

## 2020-09-29 NOTE — NURSING NOTE
"Chief Complaint   Patient presents with     Anxiety       Initial /75  Pulse 69  Temp 97.6  F (36.4  C)  Resp 18  Wt 189 lb (85.7 kg)  BMI 27.91 kg/m2 Estimated body mass index is 27.91 kg/(m^2) as calculated from the following:    Height as of 3/23/17: 5' 9\" (1.753 m).    Weight as of this encounter: 189 lb (85.7 kg).  Medication Reconciliation: complete   Rhonda Dickinson CMA      " No

## 2020-10-14 ENCOUNTER — VIRTUAL VISIT (OUTPATIENT)
Dept: PSYCHOLOGY | Facility: CLINIC | Age: 30
End: 2020-10-14
Payer: COMMERCIAL

## 2020-10-14 DIAGNOSIS — F41.1 GENERALIZED ANXIETY DISORDER: Primary | ICD-10-CM

## 2020-10-14 DIAGNOSIS — F32.2 MAJOR DEPRESSIVE DISORDER, SINGLE EPISODE, SEVERE WITH ANXIOUS DISTRESS (H): ICD-10-CM

## 2020-10-14 PROCEDURE — 90834 PSYTX W PT 45 MINUTES: CPT | Mod: 95 | Performed by: SOCIAL WORKER

## 2020-10-14 NOTE — PROGRESS NOTES
"                                             Progress Note    Client Name: Marvin Alves  Date: 10/14/2020      Service Type: Phone Visit (The client declined a video visit. He would prefer a phone visit.)      Session Start Time: 2:30 PM  Session End Time: 3:20 PM      Session Length: 50 Minutes     Session #: 36     Attendees: Client    The client has been notified of the following:     \"We have found that certain health care needs can be provided without the need for a face to face visit. This service lets us provide the care you need with a phone conversation. I will have full access to your [facility] medical record during this entire phone call. I will be taking notes for your medical record. Since this is like an office visit, we will bill your insurance company for this service. There are potential benefits and risks of telephone visits (e.g. limits to patient confidentiality) that differ from in-person visits.?Confidentiality still applies for telephone services, and nobody will record the visit. It is important to be in a quiet, private space that is free from distractions (including cell phone or other devices) during the visit.?If during the course of the call, I believe a telephone visit is not appropriate, you will not be charged for this service\".     Consent has been obtained for this service by the provider: Yes    Treatment Plan Last Reviewed: 9/16/2020  PHQ-9 / KWASI-7 : 25/21     DATA      Progress Since Last Session (Related to Symptoms / Goals / Homework):   Symptoms: No change : The client reported no change in his symptoms since the previous phone visit.    Homework: Partially completed. The client reported that he has socialized online with his friends periodically. He indicated that he did not re-implement \"worry time\".      Episode of Care Goals: No improvement - ACTION (Actively working towards change); Intervened by reinforcing change plan / affirming steps taken.     Current / Ongoing " Stressors and Concerns:   The client reported that his stress continues to be low.      Treatment Objective(s) Addressed in This Session:   The client will learn and apply skills to manage the symptoms of anxiety that he has been experiencing.      Intervention:   Discussed and reinforced the client's efforts to socialize with his friend's online periodically. Talked at length about the client's physical pain. Presented didactic information about the PLEASE skill. Obtained a commitment from the client to restart doing his physical therapy exercises.        ASSESSMENT: Current Emotional / Mental Status (status of significant symptoms):   Risk status (Self / Other harm or suicidal ideation)   Client denies current fears or concerns for personal safety.   Client denies current or recent suicidal ideation or behaviors.   Client denies current or recent homicidal ideation or behaviors.   Client denies current or recent self injurious behavior or ideation.   Client denies other safety concerns.   A safety and risk management plan has not been developed at this time, however the client was directed to call 911 should there be a change in any of these risk factors.     Attitude:   Cooperative    Orientation:   All   Speech    Rate / Production: Normal     Volume:  Normal    Mood:    Normal   Thought Content:  Clear    Thought Form:  Coherent  Logical    Insight:    Good      Medication Review:   No current psychiatric medications prescribed.     Medication Compliance:   N/A     Changes in Health Issues:   None reported.     Chemical Use Review:   Substance Use: Chemical use reviewed. No active concerns identified.      Tobacco Use: No current tobacco use.        Diagnosis:   1. Generalized anxiety disorder   2. Major depressive disorder, single episode, severe with anxious distress     Collateral Reports Completed:   Not Applicable    PLAN: (Client Tasks / Therapist Tasks / Other)  Restart doing his physical therapy  "exercises.        I have reviewed the note as documented above. This accurately captures the substance of my conversation with the client.  SUKH Velasco., MILO.                                                         ________________________________________________________________________    Treatment Plan    Client's Name: Marvin Alves  YOB: 1990    Date: 9/16/2020    DSM-V Diagnoses:     296.23 (F32.2) Major Depressive Disorder, Single Episode, Severe With Anxious Distress  300.02 (F41.1) Generalized Anxiety Disorder    Psychosocial / Contextual Factors: The client is a [age] year old, , single male who resides with his mother and his father in a home in [place], [place]. The client is currently unemployed. The client reported that he began experiencing symptoms of anxiety approximately four years ago. He indicated that he began experiencing symptoms of depression shortly thereafter. The client reported the reason for seeking therapy at this time is his lack of interest or ability to function in life.     WHODAS: 52    Referral / Collaboration:  Referral to another professional/service is not indicated at this time.    Anticipated number of session or this episode of care: 12    MeasurableTreatment Goal(s) related to diagnosis / functional impairment(s)  Goal 1: The client will learn and apply skills to manage the symptoms of anxiety that he has been experiencing.    I will know I've met my goal when I can relax.      Objective #A     The client will identify specific fears / thoughts that contribute to feeling anxious.  Status: Continued - Date(s): 9/16/2020     Intervention(s)  Therapist will assist the client in identifying specific fears/thoughts that contribute to feeling anxious.    Objective #B  The client will use  worry time  each day for 30 minutes of scheduled worry and then defer obsessive or anxious thinking until the next structured \"worry time\".  Status: " "Continued - Date(s): 9/16/2020     Intervention(s)  Therapist will teach and support the client in learning and using \"worry time\".    Objective #C  The client will use relaxation strategies two times per day to reduce the physical symptoms of anxiety.  Status: Continued - Date(s): 9/16/2020    Intervention(s)  Therapist will teach and support the client in learning and using relaxation strategies.    Objective #D  The client will use cognitive strategies identified in therapy to challenge anxious thoughts.    Status: Continued - Date(s): 9/16/2020     Intervention(s)  Therapist will teach and support the client in using cognitive strategies to challenge anxious thoughts.    Objective #E  The client will be mindful of the body sensations that he is experiencing when he is feeling anxious.  Status: Continued - Date(s): 9/16/2020     Intervention(s)  Therapist will  and support the client in being mindful of the body sensations that he is experiencing when he is feeling anxious.    Objective #F  The client will participate in EMDR treatment to reprocess traumatic memories.  Status: Continued - Date(s): 9/16/2020      Intervention(s)  Therapist will utilize EMDR treatment to facilitate the client's reprocessing of traumatic memories.      The client has reviewed and verbally agreed to the above plan.      Elissa Mckeon M.S.W., L.I.C.S.W.  September 17, 2020  "

## 2021-06-01 ENCOUNTER — VIRTUAL VISIT (OUTPATIENT)
Dept: PSYCHOLOGY | Facility: CLINIC | Age: 31
End: 2021-06-01
Payer: COMMERCIAL

## 2021-06-01 DIAGNOSIS — F32.2 MAJOR DEPRESSIVE DISORDER, SINGLE EPISODE, SEVERE WITH ANXIOUS DISTRESS (H): ICD-10-CM

## 2021-06-01 DIAGNOSIS — F41.1 GENERALIZED ANXIETY DISORDER: Primary | ICD-10-CM

## 2021-06-01 PROCEDURE — 90834 PSYTX W PT 45 MINUTES: CPT | Mod: 95 | Performed by: SOCIAL WORKER

## 2021-06-01 NOTE — PROGRESS NOTES
"                                             Progress Note    Client Name: Marvin Alves  Date: 6/1/2021      Service Type: Phone Visit (The client declined a video visit. He would prefer a phone visit.)      Session Start Time: 2:30 PM  Session End Time: 3:20 PM      Session Length: 50 Minutes     Session #: 37     Attendees: Client    The client has been notified of the following:     \"We have found that certain health care needs can be provided without the need for a face to face visit. This service lets us provide the care you need with a phone conversation. I will have full access to your [facility] medical record during this entire phone call. I will be taking notes for your medical record. Since this is like an office visit, we will bill your insurance company for this service. There are potential benefits and risks of telephone visits (e.g. limits to patient confidentiality) that differ from in-person visits.?Confidentiality still applies for telephone services, and nobody will record the visit. It is important to be in a quiet, private space that is free from distractions (including cell phone or other devices) during the visit.?If during the course of the call, I believe a telephone visit is not appropriate, you will not be charged for this service\".     Consent has been obtained for this service by the provider: Yes    Treatment Plan Last Reviewed: 6/1/2021  PHQ-9 / KWASI-7 : 25/21     DATA      Progress Since Last Session (Related to Symptoms / Goals / Homework):   Symptoms: Worsening : The client reported experiencing increased symptoms of anxiety over the past month.    Homework: Achieved / completed to satisfaction. The client reported that he did restart doing his physical therapy exercises.      Episode of Care Goals: No improvement - RELAPSE (Returned to unhealthy behavior); Intervened by reassessing readiness to change and identifying appropriate stage.  Identified reasons for relapse, successes, and " change talk.     Current / Ongoing Stressors and Concerns:   The client reported that his stress continues to be low. 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     Treatment Objective(s) Addressed in This Session:   The client will identify specific fears / thoughts that contribute to feeling anxious.      Intervention:   Examined the client's distressing thoughts. Presented didactic information about thoughts. Talked about positive events that have happened in the client's life. Obtained a commitment from the client to finish the paid music job for the [employer] .      ASSESSMENT: Current Emotional / Mental Status (status of significant symptoms):   Risk status (Self / Other harm or suicidal ideation)   Client denies current fears or concerns for personal safety.   Client denies current or recent suicidal ideation or behaviors.   Client denies current or recent homicidal ideation or behaviors.   Client denies current or recent self injurious behavior or ideation.   Client denies other safety concerns.   A safety and risk management plan has not been developed at this time, however the client was directed to call 911 should there be a change in any of these risk factors.     Attitude:   Cooperative    Orientation:   All   Speech    Rate / Production: Normal     Volume:  Normal    Mood:    Normal   Thought Content:  Clear    Thought Form:  Coherent  Logical    Insight:    Good      Medication Review:   No current psychiatric medications prescribed.     Medication Compliance:   N/A     Changes in Health Issues:   None reported.     Chemical Use Review:   Substance Use: Chemical use reviewed. No active concerns identified.      Tobacco Use: No current tobacco use.        Diagnosis:   1. Generalized anxiety disorder   2. Major depressive disorder, single episode, severe with anxious distress     Collateral Reports Completed:   Not Applicable    PLAN: (Client  Tasks / Therapist Tasks / Other)  Finish the paid music job for the [employer] .      I have reviewed the note as documented above. This accurately captures the substance of my conversation with the client.  SUKH Velasco., MILO.                                                         ________________________________________________________________________    Treatment Plan    Client's Name: Marvin Alves  YOB: 1990    Date: 6/1/2021    DSM-V Diagnoses:     296.23 (F32.2) Major Depressive Disorder, Single Episode, Severe With Anxious Distress  300.02 (F41.1) Generalized Anxiety Disorder    Psychosocial / Contextual Factors: The client is a [age] old, , single male who resides with his mother and his father in a home in [place], [place]. The client is currently unemployed. The client reported that he began experiencing symptoms of anxiety. He indicated that he began experiencing symptoms of depression shortly thereafter. The client reported the reason for seeking therapy at this time is his lack of interest or ability to function in life.     WHODAS: 52    Referral / Collaboration:  Referral to another professional/service is not indicated at this time.    Anticipated number of session or this episode of care: 12    MeasurableTreatment Goal(s) related to diagnosis / functional impairment(s)  Goal 1: The client will learn and apply skills to manage the symptoms of anxiety that he has been experiencing.    I will know I've met my goal when I can relax.      Objective #A     The client will identify specific fears / thoughts that contribute to feeling anxious.  Status: Continued - Date(s): 6/1/2021     Intervention(s)  Therapist will assist the client in identifying specific fears/thoughts that contribute to feeling anxious.    Objective #B  The client will use  worry time  each day for 30 minutes of scheduled worry and then defer obsessive or anxious thinking until the next  "structured \"worry time\".  Status: Continued - Date(s): 6/1/2021     Intervention(s)  Therapist will teach and support the client in learning and using \"worry time\".    Objective #C  The client will use relaxation strategies two times per day to reduce the physical symptoms of anxiety.  Status: Continued - Date(s): 6/1/2021    Intervention(s)  Therapist will teach and support the client in learning and using relaxation strategies.    Objective #D  The client will use cognitive strategies identified in therapy to challenge anxious thoughts.    Status: Continued - Date(s): 6/1/2021     Intervention(s)  Therapist will teach and support the client in using cognitive strategies to challenge anxious thoughts.    Objective #E  The client will be mindful of the body sensations that he is experiencing when he is feeling anxious.  Status: Continued - Date(s): 6/1/2021     Intervention(s)  Therapist will  and support the client in being mindful of the body sensations that he is experiencing when he is feeling anxious.    Objective #F  The client will participate in EMDR treatment to reprocess traumatic memories.  Status: Continued - Date(s): 6/1/2021      Intervention(s)  Therapist will utilize EMDR treatment to facilitate the client's reprocessing of traumatic memories.      The client has verbally agreed to the above plan.      Elissa Mckeon M.S.NEO., L.I.C.S.W.  June 1, 2021  "

## 2021-07-14 ENCOUNTER — VIRTUAL VISIT (OUTPATIENT)
Dept: PSYCHOLOGY | Facility: CLINIC | Age: 31
End: 2021-07-14
Payer: COMMERCIAL

## 2021-07-14 DIAGNOSIS — F41.1 GENERALIZED ANXIETY DISORDER: Primary | ICD-10-CM

## 2021-07-14 DIAGNOSIS — F32.2 MAJOR DEPRESSIVE DISORDER, SINGLE EPISODE, SEVERE WITH ANXIOUS DISTRESS (H): ICD-10-CM

## 2021-07-14 PROCEDURE — 90834 PSYTX W PT 45 MINUTES: CPT | Mod: 95 | Performed by: SOCIAL WORKER

## 2021-07-17 NOTE — PROGRESS NOTES
"                                             Progress Note    Client Name: Marvin Alves  Date: 7/14/2021      Service Type: Phone Visit (The client declined a video visit. He would prefer a phone visit.)      Session Start Time: 3:30 PM  Session End Time: 4:20 PM      Session Length: 50 Minutes     Session #: 38     Attendees: Client    The client has been notified of the following:     \"We have found that certain health care needs can be provided without the need for a face to face visit. This service lets us provide the care you need with a phone conversation. I will have full access to your [facility] medical record during this entire phone call. I will be taking notes for your medical record. Since this is like an office visit, we will bill your insurance company for this service. There are potential benefits and risks of telephone visits (e.g. limits to patient confidentiality) that differ from in-person visits.?Confidentiality still applies for telephone services, and nobody will record the visit. It is important to be in a quiet, private space that is free from distractions (including cell phone or other devices) during the visit.?If during the course of the call, I believe a telephone visit is not appropriate, you will not be charged for this service\".     Consent has been obtained for this service by the provider: Yes    Treatment Plan Last Reviewed: 6/1/2021  PHQ-9 / KWASI-7 : 25/21     DATA      Progress Since Last Session (Related to Symptoms / Goals / Homework):   Symptoms: Improving : The client reported experiencing decreased symptoms of anxiety since the previous phone visit.    Homework: Achieved / completed to satisfaction. 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      Episode of Care Goals: Minimal progress - ACTION (Actively working towards change); Intervened by reinforcing change plan / affirming steps taken.     Current / Ongoing Stressors and " Concerns:   The client reported that his stress continues to be low.      Treatment Objective(s) Addressed in This Session:   The client will identify specific fears / thoughts that contribute to feeling anxious.      Intervention:   Discussed the client's symptoms of anxiety. Examined the client's distressing thoughts and the negative effects of idle time on his thoughts. Talked about ways to decrease the client's idle time and increase structure in his life. Obtained a commitment from the client to make a list of jobs he would be interested in applying for.      ASSESSMENT: Current Emotional / Mental Status (status of significant symptoms):   Risk status (Self / Other harm or suicidal ideation)   Client denies current fears or concerns for personal safety.   Client denies current or recent suicidal ideation or behaviors.   Client denies current or recent homicidal ideation or behaviors.   Client denies current or recent self injurious behavior or ideation.   Client denies other safety concerns.   A safety and risk management plan has not been developed at this time, however the client was directed to call 911 should there be a change in any of these risk factors.     Attitude:   Cooperative    Orientation:   All   Speech    Rate / Production: Normal     Volume:  Normal    Mood:    Normal   Thought Content:  Clear    Thought Form:  Coherent  Logical    Insight:    Good      Medication Review:   No current psychiatric medications prescribed.     Medication Compliance:   N/A     Changes in Health Issues:   None reported.     Chemical Use Review:   Substance Use: Chemical use reviewed. No active concerns identified.      Tobacco Use: No current tobacco use.        Diagnosis:   1. Generalized anxiety disorder   2. Major depressive disorder, single episode, severe with anxious distress     Collateral Reports Completed:   Not Applicable    PLAN: (Client Tasks / Therapist Tasks / Other)  Make a list of jobs he would be  "interested in applying for.      I have reviewed the note as documented above. This accurately captures the substance of my conversation with the client.  MERCEDES Velasco.S.NEO., MILO.                                                         ________________________________________________________________________    Treatment Plan    Client's Name: Marvin Alves  YOB: 1990    Date: 6/1/2021    DSM-V Diagnoses:     296.23 (F32.2) Major Depressive Disorder, Single Episode, Severe With Anxious Distress  300.02 (F41.1) Generalized Anxiety Disorder    Psychosocial / Contextual Factors: The client is a [age] old, , single male who resides with his mother and his father in a home in [place], [place]. The client is currently unemployed. The client reported that he began experiencing symptoms of anxiety. He indicated that he began experiencing symptoms of depression shortly thereafter. The client reported the reason for seeking therapy at this time is his lack of interest or ability to function in life.     WHODAS: 52    Referral / Collaboration:  Referral to another professional/service is not indicated at this time.    Anticipated number of session or this episode of care: 12    MeasurableTreatment Goal(s) related to diagnosis / functional impairment(s)  Goal 1: The client will learn and apply skills to manage the symptoms of anxiety that he has been experiencing.    I will know I've met my goal when I can relax.      Objective #A     The client will identify specific fears / thoughts that contribute to feeling anxious.  Status: Continued - Date(s): 6/1/2021     Intervention(s)  Therapist will assist the client in identifying specific fears/thoughts that contribute to feeling anxious.    Objective #B  The client will use  worry time  each day for 30 minutes of scheduled worry and then defer obsessive or anxious thinking until the next structured \"worry time\".  Status: Continued - Date(s): " "6/1/2021     Intervention(s)  Therapist will teach and support the client in learning and using \"worry time\".    Objective #C  The client will use relaxation strategies two times per day to reduce the physical symptoms of anxiety.  Status: Continued - Date(s): 6/1/2021    Intervention(s)  Therapist will teach and support the client in learning and using relaxation strategies.    Objective #D  The client will use cognitive strategies identified in therapy to challenge anxious thoughts.    Status: Continued - Date(s): 6/1/2021     Intervention(s)  Therapist will teach and support the client in using cognitive strategies to challenge anxious thoughts.    Objective #E  The client will be mindful of the body sensations that he is experiencing when he is feeling anxious.  Status: Continued - Date(s): 6/1/2021     Intervention(s)  Therapist will  and support the client in being mindful of the body sensations that he is experiencing when he is feeling anxious.    Objective #F  The client will participate in EMDR treatment to reprocess traumatic memories.  Status: Continued - Date(s): 6/1/2021      Intervention(s)  Therapist will utilize EMDR treatment to facilitate the client's reprocessing of traumatic memories.      The client has verbally agreed to the above plan.      Elissa Mckeon M.S.W., L.I.C.S.W.  June 1, 2021  "

## 2021-07-24 ENCOUNTER — NURSE TRIAGE (OUTPATIENT)
Dept: NURSING | Facility: CLINIC | Age: 31
End: 2021-07-24

## 2021-07-25 NOTE — TELEPHONE ENCOUNTER
"Pt states he has hx diverticulitis. C/o brief, mild to moderate abdominal pain LUQ starting last night. Severity varies, worst is 6 out of 10. Worse after eating. Improved after BM. No vomiting. No fever. Advised see provider w/i 24 hrs. Not seen at  since 2019 other than .   Reason for Disposition    [1] MODERATE pain (e.g., interferes with normal activities) AND [2] comes and goes (cramps) AND [3] present > 24 hours  (Exception: pain with Vomiting or Diarrhea - see that Guideline)    Additional Information    Negative: Severe difficulty breathing (e.g., struggling for each breath, speaks in single words)    Negative: Shock suspected (e.g., cold/pale/clammy skin, too weak to stand, low BP, rapid pulse)    Negative: Difficult to awaken or acting confused (e.g., disoriented, slurred speech)    Negative: Passed out (i.e., lost consciousness, collapsed and was not responding)    Negative: Visible sweat on face or sweat dripping down face    Negative: Sounds like a life-threatening emergency to the triager    Negative: Followed an abdomen (stomach) injury    Negative: Chest pain    Negative: [1] SEVERE pain (e.g., excruciating) AND [2] present > 1 hour    Negative: [1] Pain lasts > 10 minutes AND [2] age > 50    Negative: [1] Pain lasts > 10 minutes AND [2] age > 40 AND [3] associated chest, arm, neck, upper back or jaw pain    Negative: [1] Pain lasts > 10 minutes AND [2] age > 35 AND [3] at least one cardiac risk factor (i.e., hypertension, diabetes, obesity, smoker or strong family history of heart disease)    Negative: [1] Pain lasts > 10 minutes AND [2] history of heart disease (i.e., heart attack, bypass surgery, angina, angioplasty, CHF; not just a heart murmur)    Negative: [1] Pain lasts > 10 minutes AND [2] difficulty breathing    Negative: [1] Vomiting AND [2] contains red blood  (Exception: few streaks and only occurred once)    Negative: [1] Vomiting AND [2] contains black (\"coffee ground\") material    " Negative: Blood in bowel movements  (Exception: Blood on surface of BM with constipation)    Negative: Black or tarry bowel movements  (Exception: chronic-unchanged  black-grey bowel movements AND is taking iron pills or Pepto-bismol)    Negative: [1] Pregnant > 24 weeks AND [2] hand or face swelling    Negative: Patient sounds very sick or weak to the triager    Negative: [1] MILD-MODERATE pain AND [2] constant AND [3] present > 2 hours    Negative: [1] MILD-MODERATE pain AND [2] not relieved by antacids    Negative: [1] Vomiting AND [2] contains bile (green color)    Negative: [1] Vomiting AND [2] abdomen looks much more swollen than usual    Negative: White of the eyes have turned yellow (i.e., jaundice)    Negative: Fever > 103 F (39.4 C)    Negative: [1] Fever > 101 F (38.3 C) AND [2] age > 60    Negative: [1] Fever > 100.0 F (37.8 C) AND [2] bedridden (e.g., nursing home patient, CVA, chronic illness, recovering from surgery)    Negative: [1] Fever > 100.0 F (37.8 C) AND [2] diabetes mellitus or weak immune system (e.g., HIV positive, cancer chemo, splenectomy, organ transplant, chronic steroids)    Protocols used: ABDOMINAL PAIN - UPPER-A-AH

## 2021-07-26 ENCOUNTER — OFFICE VISIT (OUTPATIENT)
Dept: FAMILY MEDICINE | Facility: CLINIC | Age: 31
End: 2021-07-26
Payer: COMMERCIAL

## 2021-07-26 VITALS
HEIGHT: 69 IN | SYSTOLIC BLOOD PRESSURE: 138 MMHG | WEIGHT: 237.4 LBS | HEART RATE: 81 BPM | TEMPERATURE: 98.8 F | DIASTOLIC BLOOD PRESSURE: 78 MMHG | RESPIRATION RATE: 20 BRPM | BODY MASS INDEX: 35.16 KG/M2 | OXYGEN SATURATION: 94 %

## 2021-07-26 DIAGNOSIS — B35.6 TINEA CRURIS: ICD-10-CM

## 2021-07-26 DIAGNOSIS — R10.13 EPIGASTRIC PAIN: Primary | ICD-10-CM

## 2021-07-26 LAB
ALBUMIN SERPL-MCNC: 4.2 G/DL (ref 3.4–5)
ALP SERPL-CCNC: 72 U/L (ref 40–150)
ALT SERPL W P-5'-P-CCNC: 20 U/L (ref 0–70)
ANION GAP SERPL CALCULATED.3IONS-SCNC: 6 MMOL/L (ref 3–14)
AST SERPL W P-5'-P-CCNC: 12 U/L (ref 0–45)
BASOPHILS # BLD AUTO: 0 10E3/UL (ref 0–0.2)
BASOPHILS NFR BLD AUTO: 0 %
BILIRUB DIRECT SERPL-MCNC: 0.1 MG/DL (ref 0–0.2)
BILIRUB SERPL-MCNC: 0.7 MG/DL (ref 0.2–1.3)
BUN SERPL-MCNC: 14 MG/DL (ref 7–30)
CALCIUM SERPL-MCNC: 9.1 MG/DL (ref 8.5–10.1)
CHLORIDE BLD-SCNC: 104 MMOL/L (ref 94–109)
CO2 SERPL-SCNC: 29 MMOL/L (ref 20–32)
CREAT SERPL-MCNC: 0.93 MG/DL (ref 0.66–1.25)
EOSINOPHIL # BLD AUTO: 0.2 10E3/UL (ref 0–0.7)
EOSINOPHIL NFR BLD AUTO: 4 %
ERYTHROCYTE [DISTWIDTH] IN BLOOD BY AUTOMATED COUNT: 12.7 % (ref 10–15)
GFR SERPL CREATININE-BSD FRML MDRD: >90 ML/MIN/1.73M2
GLUCOSE BLD-MCNC: 98 MG/DL (ref 70–99)
HCT VFR BLD AUTO: 45.6 % (ref 40–53)
HGB BLD-MCNC: 15.1 G/DL (ref 13.3–17.7)
LIPASE SERPL-CCNC: 59 U/L (ref 73–393)
LYMPHOCYTES # BLD AUTO: 1.7 10E3/UL (ref 0.8–5.3)
LYMPHOCYTES NFR BLD AUTO: 30 %
MCH RBC QN AUTO: 29.4 PG (ref 26.5–33)
MCHC RBC AUTO-ENTMCNC: 33.1 G/DL (ref 31.5–36.5)
MCV RBC AUTO: 89 FL (ref 78–100)
MONOCYTES # BLD AUTO: 0.5 10E3/UL (ref 0–1.3)
MONOCYTES NFR BLD AUTO: 10 %
NEUTROPHILS # BLD AUTO: 3.2 10E3/UL (ref 1.6–8.3)
NEUTROPHILS NFR BLD AUTO: 56 %
PLATELET # BLD AUTO: 212 10E3/UL (ref 150–450)
POTASSIUM BLD-SCNC: 3.6 MMOL/L (ref 3.4–5.3)
PROT SERPL-MCNC: 8.5 G/DL (ref 6.8–8.8)
RBC # BLD AUTO: 5.14 10E6/UL (ref 4.4–5.9)
SODIUM SERPL-SCNC: 139 MMOL/L (ref 133–144)
WBC # BLD AUTO: 5.6 10E3/UL (ref 4–11)

## 2021-07-26 PROCEDURE — 85025 COMPLETE CBC W/AUTO DIFF WBC: CPT | Performed by: NURSE PRACTITIONER

## 2021-07-26 PROCEDURE — 80053 COMPREHEN METABOLIC PANEL: CPT | Performed by: NURSE PRACTITIONER

## 2021-07-26 PROCEDURE — 99214 OFFICE O/P EST MOD 30 MIN: CPT | Performed by: NURSE PRACTITIONER

## 2021-07-26 PROCEDURE — 82248 BILIRUBIN DIRECT: CPT | Performed by: NURSE PRACTITIONER

## 2021-07-26 PROCEDURE — 83690 ASSAY OF LIPASE: CPT | Performed by: NURSE PRACTITIONER

## 2021-07-26 PROCEDURE — 36415 COLL VENOUS BLD VENIPUNCTURE: CPT | Performed by: NURSE PRACTITIONER

## 2021-07-26 RX ORDER — FLUCONAZOLE 150 MG/1
TABLET ORAL
Qty: 4 TABLET | Refills: 0 | Status: SHIPPED | OUTPATIENT
Start: 2021-07-26

## 2021-07-26 ASSESSMENT — PAIN SCALES - GENERAL: PAINLEVEL: NO PAIN (1)

## 2021-07-26 ASSESSMENT — MIFFLIN-ST. JEOR: SCORE: 2019.28

## 2021-07-26 NOTE — PROGRESS NOTES
"    Assessment & Plan     Epigastric pain  Pain was to epigastric area but is currently resolved.  This did increase with eating.  No nausea, no vomiting, no diarrhea.  No lower abdominal pain.  We will check labs today, however unless these are abnormal at this point I do not think there is any utility in ordering imaging unless the pain returns at which point we can get a right upper quadrant ultrasound to evaluate his gallbladder.  Both he and his mother are amenable with this plan.  He has not had preventative health care in quite some time, and PCP has retired.  We will get him an establish care appointment with the PCP in [place] which is his preferred location.  - CBC with platelets and differential; Future  - Basic metabolic panel  (Ca, Cl, CO2, Creat, Gluc, K, Na, BUN); Future  - Hepatic panel (Albumin, ALT, AST, Bili, Alk Phos, TP); Future  - Lipase; Future      Tinea cruris  He does have tinea cruris on exam today.  He thinks this has been present for about a year but he is not sure.  He does have several other fungal appearing lesions in the lower abdominal fold.  We discussed skin care here.  We will treat this with fluconazole weekly for 4 weeks to see if this improves.  - fluconazole (DIFLUCAN) 150 MG tablet; Take one tablet weekly for 4 weeks.         BMI:   Estimated body mass index is 35.57 kg/m  as calculated from the following:    Height as of this encounter: 1.74 m (5' 8.5\").    Weight as of this encounter: 107.7 kg (237 lb 6.4 oz).       Patient Instructions   We will check labs today, I will let you know what these show.  Try the fluconazole for the rash.  Schedule a physical.      Return in about 1 month (around 8/26/2021) for Physical Exam.    EDSON Perales CNP  [facility]    Jose Alberto Wong is a 31 year old who presents for the following health issues     HPI     Abdominal/Flank Pain  Onset/Duration: x 2 days   Description:   Character: Sharp  Location: " "Upper Abdomen - middle to Left Side   No pain today   Radiation: None  Intensity: mild  Progression of Symptoms:  improving  Accompanying Signs & Symptoms:  Fever/Chills: no  Developed Rash on his Abdomen in the same spot as pain  this past Saturday, some redness, flat, No itchiness   Gas/Bloating: YES  Nausea: no  Vomitting: no  Diarrhea: no  Constipation: YES- \" a little\"  Dysuria or Hematuria: no  History:   Trauma: no   Previous similar pain: yes  Has had diverticulitis in the past   Previous tests done: none  Precipitating factors:   Does the pain change with:     Food: YES- gets worse - depends on what he eats .     Bowel Movement: YES- \"pain gets better\"    Urination: no   Other factors:  no  Therapies tried and outcome: [place] diet - some relief     Above HPI reviewed. Additionally, notes that pain was to the epigastric area, and lasted for about 1 day.  This worsened after he ate, and was relieved after having a bowel movement.  It was not associated with fever, nausea, vomiting, diarrhea, constipation, hematochezia, melena.  At one point, it was question whether or not he may have had diverticulitis but he is not sure who brought this up to him as he has not had imaging to suggest this.  He does note that he has never taken any type of medication for diverticulitis in the past.  He denies a history of any previous abdominal surgeries.  He has not had any abdominal pain in the last 2 days.    He notes that he does also have a rash on his lower abdomen that he noticed a few days ago.  In further conversation, he notes that he has had a rash to the left groin for at least a year.  It is intermittently itchy.  He has never had it evaluated and has never treated it with any type of over-the-counter medications.      Review of Systems   Constitutional, HEENT, cardiovascular, pulmonary, gi and gu systems are negative, except as otherwise noted.      Objective    /78 (BP Location: Right arm, Patient Position: " "Chair, Cuff Size: Adult Large)   Pulse 81   Temp 98.8  F (37.1  C) (Tympanic)   Resp 20   Ht 1.74 m (5' 8.5\")   Wt 107.7 kg (237 lb 6.4 oz)   SpO2 94%   BMI 35.57 kg/m    Body mass index is 35.57 kg/m .  Physical Exam  Vitals and nursing note reviewed.   Constitutional:       Appearance: Normal appearance.   HENT:      Head: Normocephalic and atraumatic.      Mouth/Throat:      Mouth: Mucous membranes are moist.   Cardiovascular:      Rate and Rhythm: Normal rate.   Pulmonary:      Effort: Pulmonary effort is normal.   Abdominal:      General: Bowel sounds are normal. There is no distension.      Palpations: Abdomen is soft. There is no mass.      Tenderness: There is no abdominal tenderness. There is no right CVA tenderness, left CVA tenderness, guarding or rebound.   Musculoskeletal:      Cervical back: Neck supple.   Skin:     General: Skin is warm and dry.      Comments: There is an erythematous plaque to the left inguinal fold extending to the left suprapubic area.  There is mild scale overlying this.  There are several annular shaped mildly erythematous lesions and to darker pigmented lesions to the left lower abdomen and the abdominal skin fold.  Both of these have mild scale as well.   Neurological:      General: No focal deficit present.      Mental Status: He is alert.   Psychiatric:         Mood and Affect: Mood normal.         Behavior: Behavior normal.            Results for orders placed or performed in visit on 07/26/21 (from the past 24 hour(s))   CBC with platelets and differential    Narrative    The following orders were created for panel order CBC with platelets and differential.  Procedure                               Abnormality         Status                     ---------                               -----------         ------                     CBC with platelets and d...[[id number]]                      Final result                 Please view results for these tests on the " individual orders.   CBC with platelets and differential   Result Value Ref Range    WBC Count 5.6 4.0 - 11.0 10e3/uL    RBC Count 5.14 4.40 - 5.90 10e6/uL    Hemoglobin 15.1 13.3 - 17.7 g/dL    Hematocrit 45.6 40.0 - 53.0 %    MCV 89 78 - 100 fL    MCH 29.4 26.5 - 33.0 pg    MCHC 33.1 31.5 - 36.5 g/dL    RDW 12.7 10.0 - 15.0 %    Platelet Count 212 150 - 450 10e3/uL    % Neutrophils 56 %    % Lymphocytes 30 %    % Monocytes 10 %    % Eosinophils 4 %    % Basophils 0 %    Absolute Neutrophils 3.2 1.6 - 8.3 10e3/uL    Absolute Lymphocytes 1.7 0.8 - 5.3 10e3/uL    Absolute Monocytes 0.5 0.0 - 1.3 10e3/uL    Absolute Eosinophils 0.2 0.0 - 0.7 10e3/uL    Absolute Basophils 0.0 0.0 - 0.2 10e3/uL

## 2021-07-26 NOTE — PATIENT INSTRUCTIONS
We will check labs today, I will let you know what these show.  Try the fluconazole for the rash.  Schedule a physical.

## 2021-08-03 ENCOUNTER — OFFICE VISIT (OUTPATIENT)
Dept: FAMILY MEDICINE | Facility: CLINIC | Age: 31
End: 2021-08-03
Payer: COMMERCIAL

## 2021-08-03 VITALS
OXYGEN SATURATION: 99 % | RESPIRATION RATE: 24 BRPM | TEMPERATURE: 99.1 F | WEIGHT: 237 LBS | HEART RATE: 122 BPM | HEIGHT: 68 IN | DIASTOLIC BLOOD PRESSURE: 58 MMHG | BODY MASS INDEX: 35.92 KG/M2 | SYSTOLIC BLOOD PRESSURE: 136 MMHG

## 2021-08-03 DIAGNOSIS — F32.A DEPRESSION, UNSPECIFIED DEPRESSION TYPE: ICD-10-CM

## 2021-08-03 DIAGNOSIS — R00.0 TACHYCARDIA: ICD-10-CM

## 2021-08-03 DIAGNOSIS — F41.1 GENERALIZED ANXIETY DISORDER: ICD-10-CM

## 2021-08-03 DIAGNOSIS — R10.13 POSTPRANDIAL EPIGASTRIC PAIN: ICD-10-CM

## 2021-08-03 DIAGNOSIS — R22.2 LUMP OF SKIN OF BACK: ICD-10-CM

## 2021-08-03 DIAGNOSIS — R07.89 OTHER CHEST PAIN: ICD-10-CM

## 2021-08-03 DIAGNOSIS — Z00.00 ROUTINE GENERAL MEDICAL EXAMINATION AT A HEALTH CARE FACILITY: Primary | ICD-10-CM

## 2021-08-03 LAB — TSH SERPL DL<=0.005 MIU/L-ACNC: 2.82 MU/L (ref 0.4–4)

## 2021-08-03 PROCEDURE — 93000 ELECTROCARDIOGRAM COMPLETE: CPT | Performed by: FAMILY MEDICINE

## 2021-08-03 PROCEDURE — 36415 COLL VENOUS BLD VENIPUNCTURE: CPT | Performed by: FAMILY MEDICINE

## 2021-08-03 PROCEDURE — 84443 ASSAY THYROID STIM HORMONE: CPT | Performed by: FAMILY MEDICINE

## 2021-08-03 PROCEDURE — 99395 PREV VISIT EST AGE 18-39: CPT | Performed by: FAMILY MEDICINE

## 2021-08-03 PROCEDURE — 99214 OFFICE O/P EST MOD 30 MIN: CPT | Mod: 25 | Performed by: FAMILY MEDICINE

## 2021-08-03 PROCEDURE — 96127 BRIEF EMOTIONAL/BEHAV ASSMT: CPT | Mod: 59 | Performed by: FAMILY MEDICINE

## 2021-08-03 ASSESSMENT — PATIENT HEALTH QUESTIONNAIRE - PHQ9
5. POOR APPETITE OR OVEREATING: NOT AT ALL
SUM OF ALL RESPONSES TO PHQ QUESTIONS 1-9: 17

## 2021-08-03 ASSESSMENT — ANXIETY QUESTIONNAIRES
3. WORRYING TOO MUCH ABOUT DIFFERENT THINGS: MORE THAN HALF THE DAYS
1. FEELING NERVOUS, ANXIOUS, OR ON EDGE: NEARLY EVERY DAY
6. BECOMING EASILY ANNOYED OR IRRITABLE: SEVERAL DAYS
GAD7 TOTAL SCORE: 10
5. BEING SO RESTLESS THAT IT IS HARD TO SIT STILL: SEVERAL DAYS
7. FEELING AFRAID AS IF SOMETHING AWFUL MIGHT HAPPEN: MORE THAN HALF THE DAYS
2. NOT BEING ABLE TO STOP OR CONTROL WORRYING: SEVERAL DAYS

## 2021-08-03 ASSESSMENT — MIFFLIN-ST. JEOR: SCORE: 2013.49

## 2021-08-03 NOTE — PROGRESS NOTES
SUBJECTIVE:   CC: Marvin Alves is an 31 year old male who presents for preventative health visit.     Patient has been advised of split billing requirements and indicates understanding: Yes  Healthy Habits:    Getting at least 3 servings of Calcium per day:  Yes    Bi-annual eye exam:  NO    Dental care twice a year:  NO    Sleep apnea or symptoms of sleep apnea:  None    Diet:  Regular (no restrictions)    Frequency of exercise:  4-5 days/week    Duration of exercise:  15-30 minutes    Taking medications regularly:  Not Applicable    Barriers to taking medications:  Not applicable    PHQ-2 Total Score:    Additional concerns today:  Yes (chest pain and pain in both legs )    CHEST PAIN     Onset: 1 week ago after hosp visit ([facility] [facility] visit)    Description:   Location:  left side R side and center   Character: sharp and burning  Radiation: none   Duration: 2 minutes     Intensity: mild    Progression of Symptoms:  improving    Accompanying Signs & Symptoms:  Shortness of breath: YES  Sweating: no   Nausea/vomiting: no   Lightheadedness: YES  Palpitations: YES  Fever/Chills: no  Cough: YES- mild  Heartburn: YES    History:   Family history of heart disease unsure   Tobacco use: no    Precipitating factors:   Worse with exertion: YES- sometimes   Worse with deep breaths :  no  Related to food: YES, very much postprandial and admits to eating poorly recently    Alleviating factors:  relax       Therapies Tried and outcome: none     Patient clarifies that he had abdominal pain last week and they didn't find anything wrong. Says it's mostly fine now. Lipase, liver labs, BMP, CBC - all normal. Imaging was proposed as a possibility but not done bc he had no pain anymore    Decided that he didn't want to take diflucan because of the side effects. And he's not that bothered by the rashes he sometimes gets on his chest and groin anyway    Concern - Leg pain   Onset: 1 week ago     Description:   Patient has leg pain  that started about the same time as the chest pain     Intensity: mild    Progression of Symptoms:  improving    Accompanying Signs & Symptoms:  Stomach pain     Previous history of similar problem:   Yes but thinks it is a different pain     Precipitating factors:   Worsened by: with sitting      Alleviating factors:  Improved by: up walking     Therapies Tried and outcome: none     Anxiety -> reports he is following with therapy. Stopped for a while but starting again and didn't like the meds he's tried in the past so he doesn't want to be on anything anymore.  Feels like he gets very warm sometimes. - described as feeling like his R side of his face got super hot after eating a high sugar meal. No unexplained weight changes. Reports normal regular BMs, maybe slightly more constipation    Incidentally, mentions a bump on his back that he has had for years. Denies much irritation, reports it does not really bother him. He thinks that sometimes varies, but is really liking the job increases and shrinks in size.    PHQ 2018 2019 8/3/2021   PHQ-9 Total Score 3 0 17   Q9: Thoughts of better off dead/self-harm past 2 weeks Not at all Not at all Several days     KWASI-7 SCORE 11/10/2017 2018 8/3/2021   Total Score 21 21 10     Abuse: Current or Past(Physical, Sexual or Emotional)- No  Do you feel safe in your environment? Yes    Have you ever done Advance Care Planning? (For example, a Health Directive, POLST, or a discussion with a medical provider or your loved ones about your wishes): Yes, patient states has an Advance Care Planning document and will bring a copy to the clinic.    Social History     Tobacco Use     Smoking status: Former Smoker     Packs/day: 1.00     Years: 5.00     Pack years: 5.00     Types: Cigarettes     Quit date: 2020     Years since quittin.0     Smokeless tobacco: Never Used   Substance Use Topics     Alcohol use: Not Currently     Alcohol/week: 0.0 standard drinks      "Comment: occasional but once he starts drinking he continues to drink.     If you drink alcohol do you typically have >3 drinks per day or >7 drinks per week? No  Denies any other controlled substance use as well    Alcohol Use 8/3/2021   Prescreen: >3 drinks/day or >7 drinks/week? No     Last PSA: No results found for: PSA    Reviewed orders with patient. Reviewed health maintenance and updated orders accordingly - Yes    Reviewed and updated as needed this visit by clinical staff  Tobacco  Allergies  Meds  Problems  Med Hx  Surg Hx  Fam Hx  Soc Hx        Reviewed and updated as needed this visit by Provider  Tobacco  Allergies  Meds  Problems  Med Hx  Surg Hx  Fam Hx           Review of Systems  CONSTITUTIONAL: NEGATIVE for fever, chills, change in weight  INTEGUMENTARY/SKIN: NEGATIVE for worrisome rashes, moles or lesions  EYES: NEGATIVE for vision changes or irritation  ENT: NEGATIVE for ear, mouth and throat problems  RESP: NEGATIVE for significant cough or SOB  CV: NEGATIVE for chest pain, palpitations or peripheral edema  GI: NEGATIVE for nausea, abdominal pain, heartburn, or change in bowel habits   male: negative for dysuria, hematuria, decreased urinary stream, erectile dysfunction, urethral discharge  MUSCULOSKELETAL: NEGATIVE for significant arthralgias or myalgia  NEURO: NEGATIVE for weakness, dizziness or paresthesias  PSYCHIATRIC: NEGATIVE for changes in mood or affect    OBJECTIVE:   /58   Pulse (!) 122   Temp 99.1  F (37.3  C) (Tympanic)   Resp 24   Ht 1.734 m (5' 8.25\")   Wt 107.5 kg (237 lb)   SpO2 99%   BMI 35.77 kg/m      Physical Exam  GENERAL: healthy, alert and no distress  EYES: Eyes grossly normal to inspection, PERRL and conjunctivae and sclerae normal  HENT: ear canals and TM's normal, nose and mouth without ulcers or lesions  NECK: no adenopathy, no asymmetry, masses, or scars and thyroid normal to palpation  RESP: lungs clear to auscultation - no rales, " "rhonchi or wheezes  CV: Rapid regular rhythm, normal S1 S2, no S3 or S4, no murmur, click or rub, no peripheral edema and peripheral pulses strong  ABDOMEN: soft, nontender, no hepatosplenomegaly, no masses and bowel sounds normal  BACK: Normal in appearance, no CVA tenderness. [place] smooth, semifirm, and mobile mass can be palpated overlying the left shoulder blade. Overlying skin appears normal, nontender  MS: no gross musculoskeletal defects noted, no edema  SKIN: no suspicious lesions or rashes  NEURO: Normal strength and tone grossly, mentation intact and speech normal  PSYCH: mentation is normal. Anxious affect. A lot of psychomotor agitation, fidgeting and unable to sit still    EKG performed today and interpreted by myself.  Rate: 110  Rhythm: sinus  Axis: normal  Intervals: normal relative to tachycardia  Other abnormalities: none  Interpretation: Sinus tachycardia    ASSESSMENT/PLAN:   Marvin was seen today for establish care and physical.    Diagnoses and all orders for this visit:    Routine general medical examination at a health care facility    Other chest pain  Has been told he has \"atypical chest pain\" before. The cardiac cause seems less likely, I did feel with the tachycardia that EKG was warranted, even if just for baseline. Reviewed importance of hydration, which patient already does.  -     EKG 12-lead complete w/read - Clinics    Tachycardia  -     TSH with free T4 reflex; Future  -     EKG 12-lead complete w/read - Clinics  -     TSH with free T4 reflex    Generalized Anxiety and depression NOS - currently active  Reviewed recent labs, cbc, cmp normal.   - Is following with psychology, psychiatry  - declines to try any meds right now  -     TSH with free T4 reflex; Future  -     EKG 12-lead complete w/read - Clinics  -     TSH with free T4 reflex    Postprandial epigastric pain  Normal CBC CMP and lipase 7/26/2021. could be GERD, but patient does not think it resembles acid reflux symptoms, " "which he is familiar with. We did review OTC options in case of reflux. We also discussed potential symptoms and work-up for peptic ulcer. since it has resolved as of this appointment and abdominal exam is benign, did not feel significantly more tests were warranted. However, I did give patient the option of getting an ultrasound if the symptoms return and persist.  -     US Abdomen Complete; Future    Lump of skin of back  Likely benign and stable for years, unsure if lipoma, may be a cyst since he thinks the size goes up and down, offered U/S but pt declines for now, will continue to monitor.    Patient has been advised of split billing requirements and indicates understanding: Yes    COUNSELING:   Reviewed preventive health counseling, as reflected in patient instructions    Estimated body mass index is 35.77 kg/m  as calculated from the following:    Height as of this encounter: 1.734 m (5' 8.25\").    Weight as of this encounter: 107.5 kg (237 lb).     Weight management plan: Discussed healthy diet and exercise guidelines and HAES principles, including benefits of healthy diet and exercise regardless of body size    He reports that he quit smoking about a year ago. His smoking use included cigarettes. He has a 5.00 pack-year smoking history. He has never used smokeless tobacco.    Ada Kc MD  [facility]  "

## 2021-08-04 ASSESSMENT — ANXIETY QUESTIONNAIRES: GAD7 TOTAL SCORE: 10

## 2021-08-05 ENCOUNTER — VIRTUAL VISIT (OUTPATIENT)
Dept: PSYCHOLOGY | Facility: CLINIC | Age: 31
End: 2021-08-05
Payer: COMMERCIAL

## 2021-08-05 DIAGNOSIS — F32.2 MAJOR DEPRESSIVE DISORDER, SINGLE EPISODE, SEVERE WITH ANXIOUS DISTRESS (H): ICD-10-CM

## 2021-08-05 DIAGNOSIS — F41.1 GENERALIZED ANXIETY DISORDER: Primary | ICD-10-CM

## 2021-08-05 PROCEDURE — 90834 PSYTX W PT 45 MINUTES: CPT | Mod: 95 | Performed by: SOCIAL WORKER

## 2021-08-07 NOTE — PROGRESS NOTES
"                                             Progress Note    Client Name: Marvin Alves  Date: 8/5/2021      Service Type: Phone Visit (The client declined a video visit. He would prefer a phone visit.)      Session Start Time: 4:30 PM  Session End Time: 5:20 PM      Session Length: 50 Minutes     Session #: 39     Attendees: Client    The client has been notified of the following:     \"We have found that certain health care needs can be provided without the need for a face to face visit. This service lets us provide the care you need with a phone conversation. I will have full access to your [facility] medical record during this entire phone call. I will be taking notes for your medical record. Since this is like an office visit, we will bill your insurance company for this service. There are potential benefits and risks of telephone visits (e.g. limits to patient confidentiality) that differ from in-person visits.?Confidentiality still applies for telephone services, and nobody will record the visit. It is important to be in a quiet, private space that is free from distractions (including cell phone or other devices) during the visit.?If during the course of the call, I believe a telephone visit is not appropriate, you will not be charged for this service\".     Consent has been obtained for this service by the provider: Yes    Treatment Plan Last Reviewed: 6/1/2021  PHQ-9 / KWASI-7 : 25/21     DATA      Progress Since Last Session (Related to Symptoms / Goals / Homework):   Symptoms: Worsening : The client reported experiencing increased symptoms of anxiety since recently experiencing physical health issues.    Homework: Did not complete. The client reported that he did not make a list of jobs he would be interested in applying for.      Episode of Care Goals: No improvement - CONTEMPLATION (Considering change and yet undecided); Intervened by assessing the negative and positive thinking (ambivalence) about behavior " change.     Current / Ongoing Stressors and Concerns:   The client reported that his stress has increased.     Treatment Objective(s) Addressed in This Session:   The client will use relaxation strategies two times per day to reduce the physical symptoms of anxiety.      Intervention:   Discussed the client's recent physical health issues. Examined the client's increased symptoms of anxiety. Reviewed a relaxation strategy. Obtained a commitment from the client to re-implement use of the relaxation strategy.      ASSESSMENT: Current Emotional / Mental Status (status of significant symptoms):   Risk status (Self / Other harm or suicidal ideation)   Client denies current fears or concerns for personal safety.   Client denies current or recent suicidal ideation or behaviors.   Client denies current or recent homicidal ideation or behaviors.   Client denies current or recent self injurious behavior or ideation.   Client denies other safety concerns.   A safety and risk management plan has not been developed at this time, however the client was directed to call 911 should there be a change in any of these risk factors.     Attitude:   Cooperative    Orientation:   All   Speech    Rate / Production: Normal     Volume:  Normal    Mood:    Moderately Anxious   Thought Content:  Clear    Thought Form:  Coherent  Logical    Insight:    Good      Medication Review:   No current psychiatric medications prescribed.     Medication Compliance:   N/A     Changes in Health Issues:   Yes: Please see the updated medical record in EPIC.     Chemical Use Review:   Substance Use: Chemical use reviewed. No active concerns identified.      Tobacco Use: No current tobacco use.        Diagnosis:   1. Generalized anxiety disorder   2. Major depressive disorder, single episode, severe with anxious distress     Collateral Reports Completed:   Not Applicable    PLAN: (Client Tasks / Therapist Tasks / Other)  Re-implement use of the relaxation  "strategy.      I have reviewed the note as documented above. This accurately captures the substance of my conversation with the client.  SUKH Velasco., MILO.                                                         ________________________________________________________________________    Treatment Plan    Client's Name: Marvin Alves  YOB: 1990    Date: 6/1/2021    DSM-V Diagnoses:     296.23 (F32.2) Major Depressive Disorder, Single Episode, Severe With Anxious Distress  300.02 (F41.1) Generalized Anxiety Disorder    Psychosocial / Contextual Factors: The client is a [age] old, , single male who resides with his mother and his father in a home in [place], [place]. The client is currently unemployed. The client reported that he began experiencing symptoms of anxiety. He indicated that he began experiencing symptoms of depression shortly thereafter. The client reported the reason for seeking therapy at this time is his lack of interest or ability to function in life.     WHODAS: 52    Referral / Collaboration:  Referral to another professional/service is not indicated at this time.    Anticipated number of session or this episode of care: 12    MeasurableTreatment Goal(s) related to diagnosis / functional impairment(s)  Goal 1: The client will learn and apply skills to manage the symptoms of anxiety that he has been experiencing.    I will know I've met my goal when I can relax.      Objective #A     The client will identify specific fears / thoughts that contribute to feeling anxious.  Status: Continued - Date(s): 6/1/2021     Intervention(s)  Therapist will assist the client in identifying specific fears/thoughts that contribute to feeling anxious.    Objective #B  The client will use  worry time  each day for 30 minutes of scheduled worry and then defer obsessive or anxious thinking until the next structured \"worry time\".  Status: Continued - Date(s): 6/1/2021 " "    Intervention(s)  Therapist will teach and support the client in learning and using \"worry time\".    Objective #C  The client will use relaxation strategies two times per day to reduce the physical symptoms of anxiety.  Status: Continued - Date(s): 6/1/2021    Intervention(s)  Therapist will teach and support the client in learning and using relaxation strategies.    Objective #D  The client will use cognitive strategies identified in therapy to challenge anxious thoughts.    Status: Continued - Date(s): 6/1/2021     Intervention(s)  Therapist will teach and support the client in using cognitive strategies to challenge anxious thoughts.    Objective #E  The client will be mindful of the body sensations that he is experiencing when he is feeling anxious.  Status: Continued - Date(s): 6/1/2021     Intervention(s)  Therapist will  and support the client in being mindful of the body sensations that he is experiencing when he is feeling anxious.    Objective #F  The client will participate in EMDR treatment to reprocess traumatic memories.  Status: Continued - Date(s): 6/1/2021      Intervention(s)  Therapist will utilize EMDR treatment to facilitate the client's reprocessing of traumatic memories.      The client has verbally agreed to the above plan.      Elissa Mckeon M.S.W., L.I.C.S.W.  June 1, 2021  "

## 2021-09-10 ENCOUNTER — APPOINTMENT (OUTPATIENT)
Dept: GENERAL RADIOLOGY | Facility: CLINIC | Age: 31
End: 2021-09-10
Attending: FAMILY MEDICINE
Payer: COMMERCIAL

## 2021-09-10 ENCOUNTER — HOSPITAL ENCOUNTER (EMERGENCY)
Facility: CLINIC | Age: 31
Discharge: HOME | End: 2021-09-10
Attending: FAMILY MEDICINE | Admitting: FAMILY MEDICINE
Payer: COMMERCIAL

## 2021-09-10 VITALS
RESPIRATION RATE: 16 BRPM | WEIGHT: 237 LBS | TEMPERATURE: 98.2 F | HEART RATE: 78 BPM | BODY MASS INDEX: 35.77 KG/M2 | OXYGEN SATURATION: 96 % | SYSTOLIC BLOOD PRESSURE: 135 MMHG | DIASTOLIC BLOOD PRESSURE: 81 MMHG

## 2021-09-10 DIAGNOSIS — M25.50 MULTIPLE JOINT PAIN: ICD-10-CM

## 2021-09-10 DIAGNOSIS — K59.00 CONSTIPATION, UNSPECIFIED CONSTIPATION TYPE: ICD-10-CM

## 2021-09-10 DIAGNOSIS — R06.02 SHORTNESS OF BREATH: ICD-10-CM

## 2021-09-10 LAB
ALBUMIN SERPL-MCNC: 4.1 G/DL (ref 3.4–5)
ALP SERPL-CCNC: 76 U/L (ref 40–150)
ALT SERPL W P-5'-P-CCNC: 31 U/L (ref 0–70)
ANION GAP SERPL CALCULATED.3IONS-SCNC: 7 MMOL/L (ref 3–14)
AST SERPL W P-5'-P-CCNC: 13 U/L (ref 0–45)
BASOPHILS # BLD AUTO: 0 10E3/UL (ref 0–0.2)
BASOPHILS NFR BLD AUTO: 0 %
BILIRUB SERPL-MCNC: 0.4 MG/DL (ref 0.2–1.3)
BUN SERPL-MCNC: 14 MG/DL (ref 7–30)
CALCIUM SERPL-MCNC: 9.4 MG/DL (ref 8.5–10.1)
CHLORIDE BLD-SCNC: 107 MMOL/L (ref 94–109)
CO2 SERPL-SCNC: 26 MMOL/L (ref 20–32)
CREAT SERPL-MCNC: 0.84 MG/DL (ref 0.66–1.25)
EOSINOPHIL # BLD AUTO: 0 10E3/UL (ref 0–0.7)
EOSINOPHIL NFR BLD AUTO: 0 %
ERYTHROCYTE [DISTWIDTH] IN BLOOD BY AUTOMATED COUNT: 11.9 % (ref 10–15)
GFR SERPL CREATININE-BSD FRML MDRD: >90 ML/MIN/1.73M2
GLUCOSE BLD-MCNC: 100 MG/DL (ref 70–99)
HCT VFR BLD AUTO: 45.2 % (ref 40–53)
HGB BLD-MCNC: 15.6 G/DL (ref 13.3–17.7)
IMM GRANULOCYTES # BLD: 0 10E3/UL
IMM GRANULOCYTES NFR BLD: 0 %
LYMPHOCYTES # BLD AUTO: 1.4 10E3/UL (ref 0.8–5.3)
LYMPHOCYTES NFR BLD AUTO: 15 %
MCH RBC QN AUTO: 29.3 PG (ref 26.5–33)
MCHC RBC AUTO-ENTMCNC: 34.5 G/DL (ref 31.5–36.5)
MCV RBC AUTO: 85 FL (ref 78–100)
MONOCYTES # BLD AUTO: 0.5 10E3/UL (ref 0–1.3)
MONOCYTES NFR BLD AUTO: 5 %
NEUTROPHILS # BLD AUTO: 7.4 10E3/UL (ref 1.6–8.3)
NEUTROPHILS NFR BLD AUTO: 80 %
NRBC # BLD AUTO: 0 10E3/UL
NRBC BLD AUTO-RTO: 0 /100
PLATELET # BLD AUTO: 245 10E3/UL (ref 150–450)
POTASSIUM BLD-SCNC: 3.7 MMOL/L (ref 3.4–5.3)
PROT SERPL-MCNC: 8.7 G/DL (ref 6.8–8.8)
RBC # BLD AUTO: 5.33 10E6/UL (ref 4.4–5.9)
SODIUM SERPL-SCNC: 140 MMOL/L (ref 133–144)
WBC # BLD AUTO: 9.3 10E3/UL (ref 4–11)

## 2021-09-10 PROCEDURE — 82040 ASSAY OF SERUM ALBUMIN: CPT | Performed by: FAMILY MEDICINE

## 2021-09-10 PROCEDURE — 99282 EMERGENCY DEPT VISIT SF MDM: CPT | Performed by: FAMILY MEDICINE

## 2021-09-10 PROCEDURE — 71046 X-RAY EXAM CHEST 2 VIEWS: CPT

## 2021-09-10 PROCEDURE — 99284 EMERGENCY DEPT VISIT MOD MDM: CPT | Mod: 25 | Performed by: FAMILY MEDICINE

## 2021-09-10 PROCEDURE — 85025 COMPLETE CBC W/AUTO DIFF WBC: CPT | Performed by: FAMILY MEDICINE

## 2021-09-10 PROCEDURE — 36415 COLL VENOUS BLD VENIPUNCTURE: CPT | Performed by: FAMILY MEDICINE

## 2021-09-10 NOTE — ED TRIAGE NOTES
SOB, chills, fatigue one month duration but worsened since noon today, did 20 minutes on the elliptical and felt better,checked temp 94.3 oral  Hurts to take a deep breath, numbness to his hands and in his feet which happens often for him  Recently took a medication for a rash one month ago and got chest pain after taking it  No cough, no HA  Vaccinated for covid, states has been feeling worse since

## 2021-09-11 NOTE — DISCHARGE INSTRUCTIONS
Return to the Emergency Room if the following occurs:     Severely worsened breathing, fainting, dehydration, fever >101, or for any concern at anytime.    Or, follow-up with the following provider as we discussed:     Return to your primary doctor for further discussion.    Medications discussed:    MiraLax OTC.  Titrate the medicine to achieve a soft, easy stool every 1-2 days.  Prilosec daily x 30 days.  Avoid caffeine, smoking, chewing tobacco, ibuprofen/advil/aleve, alcohol, eating within 2-3 hours of bed.    If you received pain-relieving or sedating medication during your time in the ER, avoid alcohol, driving automobiles, or working with machinery.  Also, a responsible adult must stay with you.        Call the Nurse Advice Line at [phone] or [phone] for any concern at anytime.

## 2021-09-11 NOTE — ED PROVIDER NOTES
"  HPI   The patient is a 31-year-old male presenting with multiple concerns.  He was seen by his primary about a month ago.  He presented with chest pain and left upper quadrant abdominal pain.  He had an EKG which showed sinus tachycardia otherwise unremarkable.  His thyroid was checked and it was normal.  Prior to that he was seen in July, 2021.  He had a broad blood work-up at that time which was unremarkable.  He does not smoke.  No tobacco.  No drugs of abuse.  No marijuana.  No alcohol.    The patient has had off-and-on chest pain.  This comes on \"about every other day.\"  He cannot point to an obvious exacerbating or relieving factor.  Pain is felt in the midline.  He denies abdominal pain.  He denies shortness of breath with the chest pain specifically.  No nausea or vomiting or diaphoresis with the chest pain.  He has had shortness of breath that also comes and goes.  Again, there is no obvious exacerbating or relieving factor.  No cough or congestion.  No fever.  No leg pain or swelling.  No ongoing abdominal pain though he has had some recently, as above.  No diarrhea.  He has had trouble with constipation.  No hematochezia or melena.  No dysuria, urgency, or frequency of urination.  No trauma or injury.        Allergies:  No Known Allergies  Problem List:    Patient Active Problem List    Diagnosis Date Noted     Lump of skin of back 08/03/2021     Priority: Medium     Generalized anxiety disorder 08/03/2021     Priority: Medium     Depression, unspecified depression type 08/03/2021     Priority: Medium     Alcohol abuse counseling and surveillance 07/14/2016     Priority: Medium     Anxiety attack 07/14/2016     Priority: Medium     Obesity due to excess calories 07/14/2016     Priority: Medium     Tobacco abuse 07/14/2016     Priority: Medium      Past Medical History:    No past medical history on file.  Past Surgical History:    No past surgical history on file.  Family History:    Family History "   Problem Relation Age of Onset     Diabetes Mother      Hypertension Mother      Diabetes Father      Depression Father      Diabetes Maternal Grandmother      Bipolar Disorder Brother      Schizophrenia Paternal Grandfather      Bipolar Disorder Paternal Uncle      Social History:  Marital Status:  Single [1]  Social History     Tobacco Use     Smoking status: Former Smoker     Packs/day: 1.00     Years: 5.00     Pack years: 5.00     Types: Cigarettes     Quit date: 2020     Years since quittin.1     Smokeless tobacco: Never Used   Vaping Use     Vaping Use: Never used   Substance Use Topics     Alcohol use: Not Currently     Alcohol/week: 0.0 standard drinks     Comment: occasional but once he starts drinking he continues to drink.     Drug use: Not Currently     Types: Marijuana     Comment: weed      Medications:    fluconazole (DIFLUCAN) 150 MG tablet  NO ACTIVE MEDICATIONS  order for DME      Review of Systems   All other systems reviewed and are negative.      PE   BP: (!) 151/93  Pulse: (!) 122  Temp: 97.6  F (36.4  C)  Resp: 20  Weight: 107.5 kg (237 lb)  SpO2: 97 %  Physical Exam  Vitals and nursing note reviewed.   Constitutional:       General: He is not in acute distress.  HENT:      Head: Atraumatic.      Right Ear: External ear normal.      Left Ear: External ear normal.      Nose: Nose normal.      Mouth/Throat:      Mouth: Mucous membranes are moist.      Pharynx: Oropharynx is clear.   Eyes:      General: No scleral icterus.     Extraocular Movements: Extraocular movements intact.      Conjunctiva/sclera: Conjunctivae normal.      Pupils: Pupils are equal, round, and reactive to light.   Cardiovascular:      Rate and Rhythm: Tachycardia present.   Pulmonary:      Effort: Pulmonary effort is normal. No respiratory distress.   Abdominal:      Comments: Soft throughout.  No tenderness.  No organomegaly or mass.  No distention.   Musculoskeletal:         General: Normal range of motion.       Cervical back: Normal range of motion.   Skin:     General: Skin is warm and dry.   Neurological:      Mental Status: He is alert and oriented to person, place, and time.   Psychiatric:         Behavior: Behavior normal.         ED COURSE and [facility]   2028.  The patient has ongoing tachycardia.  This was seen previously in the clinic and documented as sinus tachycardia on EKG.  He has had a couple of work-ups recently.  Lab values today are pending.  Low concern for severe underlying pathology.  He does admit to having anxiety and this may certainly be contributing to his present concern and recent symptoms.  He also has history of constipation and what sounds like dyspepsia or reflux.    2117.  Lab values unremarkable.  The patient is in no distress throughout his time here in the ED.  Known history of tachycardia, improved while here.  Low concern for severe underlying pathology.  No evidence for infectious etiology.  Low concern for PE, PERC rule negative.  No report of drugs of abuse.  Follow-up discussed.  Return for worsening.    LABS  Labs Ordered and Resulted from Time of ED Arrival Up to the Time of Departure from the ED   COMPREHENSIVE METABOLIC PANEL - Abnormal; Notable for the following components:       Result Value    Glucose 100 (*)     All other components within normal limits   CBC WITH PLATELETS & DIFFERENTIAL    Narrative:     The following orders were created for panel order CBC with platelets, differential.  Procedure                               Abnormality         Status                     ---------                               -----------         ------                     CBC with platelets and d...[[id number]]                      Final result                 Please view results for these tests on the individual orders.   CBC WITH PLATELETS AND DIFFERENTIAL       IMAGING  Images reviewed by me.  Radiology report also reviewed.  XR Chest 2 Views   Preliminary Result   IMPRESSION: PA and  lateral views of the chest. Lungs are clear. Heart   is normal in size. No effusions are evident. No pneumothorax.          Procedures    Medications - No data to display      IMPRESSION       ICD-10-CM    1. Shortness of breath  R06.02    2. Multiple joint pain  M25.50    3. Constipation, unspecified constipation type  K59.00             Medication List      There are no discharge medications for this visit.                       Rangel Cuadra MD  09/10/21 [id number]

## 2021-09-11 NOTE — ED NOTES
In bed. Having one month of on/off chills. There is no SOB or specific pain. Felt cold today and reported a low temp after working out.

## 2021-09-23 ENCOUNTER — VIRTUAL VISIT (OUTPATIENT)
Dept: PSYCHOLOGY | Facility: CLINIC | Age: 31
End: 2021-09-23
Payer: COMMERCIAL

## 2021-09-23 DIAGNOSIS — F41.1 GENERALIZED ANXIETY DISORDER: Primary | ICD-10-CM

## 2021-09-23 DIAGNOSIS — F32.2 MAJOR DEPRESSIVE DISORDER, SINGLE EPISODE, SEVERE WITH ANXIOUS DISTRESS (H): ICD-10-CM

## 2021-09-23 PROCEDURE — 90834 PSYTX W PT 45 MINUTES: CPT | Mod: 95 | Performed by: SOCIAL WORKER

## 2021-09-24 NOTE — PROGRESS NOTES
"                                             Progress Note    Client Name: Marvin Alves  Date: 9/23/2021      Service Type: Phone Visit (The client declined a video visit. He would prefer a phone visit.)      Session Start Time: 8:30 AM  Session End Time: 9:20 AM      Session Length: 50 Minutes     Session #: 40     Attendees: Client    The client has been notified of the following:     \"We have found that certain health care needs can be provided without the need for a face to face visit. This service lets us provide the care you need with a phone conversation. I will have full access to your [facility] medical record during this entire phone call. I will be taking notes for your medical record. Since this is like an office visit, we will bill your insurance company for this service. There are potential benefits and risks of telephone visits (e.g. limits to patient confidentiality) that differ from in-person visits.?Confidentiality still applies for telephone services, and nobody will record the visit. It is important to be in a quiet, private space that is free from distractions (including cell phone or other devices) during the visit.?If during the course of the call, I believe a telephone visit is not appropriate, you will not be charged for this service\".     Consent has been obtained for this service by the provider: Yes    Treatment Plan Last Reviewed: 9/23/2021  PHQ-9 / KWASI-7 : 25/21     DATA      Progress Since Last Session (Related to Symptoms / Goals / Homework):   Symptoms: No change : The client reported no change in his symptoms since the previous phone visit.    Homework: Did not complete. The client reported that he did not re-implement use of the relaxation strategy.      Episode of Care Goals: No improvement - CONTEMPLATION (Considering change and yet undecided); Intervened by assessing the negative and positive thinking (ambivalence) about behavior change.     Current / Ongoing Stressors and " Concerns:   The client reported that his primary stressors is the physical health issues he has been experiencing.     Treatment Objective(s) Addressed in This Session:   The client will use relaxation strategies two times per day to reduce the physical symptoms of anxiety.      Intervention:   Discussed the client's recent physical health issues. Examined the client's symptoms of anxiety. Talked about the client's recent stressors. Obtained a commitment from the client to re-implement use of the relaxation strategy.      ASSESSMENT: Current Emotional / Mental Status (status of significant symptoms):   Risk status (Self / Other harm or suicidal ideation)   Client denies current fears or concerns for personal safety.   Client denies current or recent suicidal ideation or behaviors.   Client denies current or recent homicidal ideation or behaviors.   Client denies current or recent self injurious behavior or ideation.   Client denies other safety concerns.   A safety and risk management plan has not been developed at this time, however the client was directed to call 911 should there be a change in any of these risk factors.     Attitude:   Cooperative    Orientation:   All   Speech    Rate / Production: Normal     Volume:  Normal    Mood:    Moderately Anxious   Thought Content:  Clear    Thought Form:  Coherent  Logical    Insight:    Good      Medication Review:   No current psychiatric medications prescribed.     Medication Compliance:   N/A     Changes in Health Issues:   Yes: Please see the updated medical record in EPIC.     Chemical Use Review:   Substance Use: Chemical use reviewed. No active concerns identified.      Tobacco Use: No current tobacco use.        Diagnosis:   1. Generalized anxiety disorder   2. Major depressive disorder, single episode, severe with anxious distress     Collateral Reports Completed:   Not Applicable    PLAN: (Client Tasks / Therapist Tasks / Other)  Re-implement use of the  "relaxation strategy.      I have reviewed the note as documented above. This accurately captures the substance of my conversation with the client.  SUKH Velasco., MILO.                                                         ________________________________________________________________________    Treatment Plan    Client's Name: Marvin Alves  YOB: 1990    Date: 9/23/2021    DSM-V Diagnoses:     296.23 (F32.2) Major Depressive Disorder, Single Episode, Severe With Anxious Distress  300.02 (F41.1) Generalized Anxiety Disorder    Psychosocial / Contextual Factors: The client is a [age] old, , single male who resides with his mother and his father in a home in [place], [place]. The client is currently unemployed. The client reported that he began experiencing symptoms of anxiety. He indicated that he began experiencing symptoms of depression shortly thereafter. The client reported the reason for seeking therapy at this time is his lack of interest or ability to function in life.     WHODAS: 52    Referral / Collaboration:  Referral to another professional/service is not indicated at this time.    Anticipated number of session or this episode of care: 12    MeasurableTreatment Goal(s) related to diagnosis / functional impairment(s)  Goal 1: The client will learn and apply skills to manage the symptoms of anxiety that he has been experiencing.    I will know I've met my goal when I can relax.      Objective #A     The client will identify specific fears / thoughts that contribute to feeling anxious.  Status: Continued - Date(s): 9/23/2021     Intervention(s)  Therapist will assist the client in identifying specific fears/thoughts that contribute to feeling anxious.    Objective #B  The client will use  worry time  each day for 30 minutes of scheduled worry and then defer obsessive or anxious thinking until the next structured \"worry time\".  Status: Continued - Date(s): 9/23/2021 " "    Intervention(s)  Therapist will teach and support the client in learning and using \"worry time\".    Objective #C  The client will use relaxation strategies two times per day to reduce the physical symptoms of anxiety.  Status: Continued - Date(s): 9/23/2021    Intervention(s)  Therapist will teach and support the client in learning and using relaxation strategies.    Objective #D  The client will use cognitive strategies identified in therapy to challenge anxious thoughts.    Status: Continued - Date(s): 9/23/2021     Intervention(s)  Therapist will teach and support the client in using cognitive strategies to challenge anxious thoughts.    Objective #E  The client will be mindful of the body sensations that he is experiencing when he is feeling anxious.  Status: Continued - Date(s): 9/23/2021     Intervention(s)  Therapist will  and support the client in being mindful of the body sensations that he is experiencing when he is feeling anxious.    Objective #F  The client will participate in EMDR treatment to reprocess traumatic memories.  Status: Continued - Date(s): 9/23/2021      Intervention(s)  Therapist will utilize EMDR treatment to facilitate the client's reprocessing of traumatic memories.      The client has verbally agreed to the above plan.      Elissa Mckeon M.S.W., L.I.C.S.W.  September 24, 2021  "

## 2021-10-27 ENCOUNTER — VIRTUAL VISIT (OUTPATIENT)
Dept: PSYCHOLOGY | Facility: CLINIC | Age: 31
End: 2021-10-27
Payer: COMMERCIAL

## 2021-10-27 DIAGNOSIS — F41.1 GENERALIZED ANXIETY DISORDER: Primary | ICD-10-CM

## 2021-10-27 DIAGNOSIS — F32.2 MAJOR DEPRESSIVE DISORDER, SINGLE EPISODE, SEVERE WITH ANXIOUS DISTRESS (H): ICD-10-CM

## 2021-10-27 PROCEDURE — 90834 PSYTX W PT 45 MINUTES: CPT | Mod: 95 | Performed by: SOCIAL WORKER

## 2021-10-28 NOTE — PROGRESS NOTES
"                                             Progress Note    Client Name: Marvin Alves  Date: 10/27/2021      Service Type: Phone Visit (The client declined a video visit. He would prefer a phone visit.)      Session Start Time: 2:30 PM  Session End Time: 3:20 PM      Session Length: 50 Minutes     Session #: 41     Attendees: Client    The client has been notified of the following:     \"We have found that certain health care needs can be provided without the need for a face to face visit. This service lets us provide the care you need with a phone conversation. I will have full access to your [facility] medical record during this entire phone call. I will be taking notes for your medical record. Since this is like an office visit, we will bill your insurance company for this service. There are potential benefits and risks of telephone visits (e.g. limits to patient confidentiality) that differ from in-person visits.?Confidentiality still applies for telephone services, and nobody will record the visit. It is important to be in a quiet, private space that is free from distractions (including cell phone or other devices) during the visit.?If during the course of the call, I believe a telephone visit is not appropriate, you will not be charged for this service\".     Consent has been obtained for this service by the provider: Yes    Treatment Plan Last Reviewed: 9/23/2021  PHQ-9 / KWASI-7 : 25/21     DATA      Progress Since Last Session (Related to Symptoms / Goals / Homework):   Symptoms: Improving : The client reported experiencing improved mood and decreased symptoms of anxiety.    Homework: Achieved / completed to satisfaction. The client reported that he did re-implement use of the relaxation strategy.      Episode of Care Goals: Satisfactory progress - ACTION (Actively working towards change); Intervened by reinforcing change plan / affirming steps taken.     Current / Ongoing Stressors and Concerns:   The client " reported that his stress has decreased.     Treatment Objective(s) Addressed in This Session:   The client will use relaxation strategies two times per day to reduce the physical symptoms of anxiety.      Intervention:   Examined the client's improved mood and decreased symptoms of anxiety. Discussed and reinforced the client's efforts to re-implement use of the relaxation strategy. Talked about recent positive events that have happened in the client's life.      ASSESSMENT: Current Emotional / Mental Status (status of significant symptoms):   Risk status (Self / Other harm or suicidal ideation)   Client denies current fears or concerns for personal safety.   Client denies current or recent suicidal ideation or behaviors.   Client denies current or recent homicidal ideation or behaviors.   Client denies current or recent self injurious behavior or ideation.   Client denies other safety concerns.   A safety and risk management plan has not been developed at this time, however the client was directed to call 911 should there be a change in any of these risk factors.     Attitude:   Cooperative    Orientation:   All   Speech    Rate / Production: Normal     Volume:  Normal    Mood:    Normal   Thought Content:  Clear    Thought Form:  Coherent  Logical    Insight:    Good      Medication Review:   No current psychiatric medications prescribed.     Medication Compliance:   N/A     Changes in Health Issues:   None reported.     Chemical Use Review:   Substance Use: Chemical use reviewed. No active concerns identified.      Tobacco Use: No current tobacco use.        Diagnosis:   1. Generalized anxiety disorder   2. Major depressive disorder, single episode, severe with anxious distress     Collateral Reports Completed:   Not Applicable    PLAN: (Client Tasks / Therapist Tasks / Other)  N/A      I have reviewed the note as documented above. This accurately captures the substance of my conversation with the client.  Elissa  "SALAZAR Romano, MILO.                                                         ________________________________________________________________________    Treatment Plan    Client's Name: Marvin Alves  YOB: 1990    Date: 9/23/2021    DSM-V Diagnoses:     296.23 (F32.2) Major Depressive Disorder, Single Episode, Severe With Anxious Distress  300.02 (F41.1) Generalized Anxiety Disorder    Psychosocial / Contextual Factors: The client is a [age] old, , single male who resides with his mother and his father in a home in [place], [place]. The client is currently unemployed. The client reported that he began experiencing symptoms of anxiety. He indicated that he began experiencing symptoms of depression shortly thereafter. The client reported the reason for seeking therapy at this time is his lack of interest or ability to function in life.     WHODAS: 52    Referral / Collaboration:  Referral to another professional/service is not indicated at this time.    Anticipated number of session or this episode of care: 12    MeasurableTreatment Goal(s) related to diagnosis / functional impairment(s)  Goal 1: The client will learn and apply skills to manage the symptoms of anxiety that he has been experiencing.    I will know I've met my goal when I can relax.      Objective #A     The client will identify specific fears / thoughts that contribute to feeling anxious.  Status: Continued - Date(s): 9/23/2021     Intervention(s)  Therapist will assist the client in identifying specific fears/thoughts that contribute to feeling anxious.    Objective #B  The client will use  worry time  each day for 30 minutes of scheduled worry and then defer obsessive or anxious thinking until the next structured \"worry time\".  Status: Continued - Date(s): 9/23/2021     Intervention(s)  Therapist will teach and support the client in learning and using \"worry time\".    Objective #C  The client will use relaxation " strategies two times per day to reduce the physical symptoms of anxiety.  Status: Continued - Date(s): 9/23/2021    Intervention(s)  Therapist will teach and support the client in learning and using relaxation strategies.    Objective #D  The client will use cognitive strategies identified in therapy to challenge anxious thoughts.    Status: Continued - Date(s): 9/23/2021     Intervention(s)  Therapist will teach and support the client in using cognitive strategies to challenge anxious thoughts.    Objective #E  The client will be mindful of the body sensations that he is experiencing when he is feeling anxious.  Status: Continued - Date(s): 9/23/2021     Intervention(s)  Therapist will  and support the client in being mindful of the body sensations that he is experiencing when he is feeling anxious.    Objective #F  The client will participate in EMDR treatment to reprocess traumatic memories.  Status: Continued - Date(s): 9/23/2021      Intervention(s)  Therapist will utilize EMDR treatment to facilitate the client's reprocessing of traumatic memories.      The client has verbally agreed to the above plan.      Elissa Mckeon M.S.W., L.I.C.S.W.  September 24, 2021

## 2021-11-24 ENCOUNTER — VIRTUAL VISIT (OUTPATIENT)
Dept: PSYCHOLOGY | Facility: CLINIC | Age: 31
End: 2021-11-24
Payer: COMMERCIAL

## 2021-11-24 DIAGNOSIS — F41.1 GENERALIZED ANXIETY DISORDER: Primary | ICD-10-CM

## 2021-11-24 DIAGNOSIS — F32.2 MAJOR DEPRESSIVE DISORDER, SINGLE EPISODE, SEVERE WITH ANXIOUS DISTRESS (H): ICD-10-CM

## 2021-11-24 PROCEDURE — 90834 PSYTX W PT 45 MINUTES: CPT | Mod: 95 | Performed by: SOCIAL WORKER

## 2021-11-24 NOTE — PROGRESS NOTES
"                     Discharge Summary  Multiple Sessions    Client Name: Marvin Alves  MRN#: [id number] YOB: 1990    Discharge Date:   November 24, 2021    Service Modality: Phone Visit    The client has been notified of the following:      \"We have found that certain health care needs can be provided without the need for a face to face visit. This service lets us provide the care you need with a phone conversation. I will have full access to your [facility] medical record during this entire phone call. I will be taking notes for your medical record. Since this is like an office visit, we will bill your insurance company for this service. There are potential benefits and risks of telephone visits (e.g. limits to patient confidentiality) that differ from in-person visits.?Confidentiality still applies for telephone services, and nobody will record the visit. It is important to be in a quiet, private space that is free of distractions (including cell phone or other devices) during the visit. If, during the course of the call, I believe a telephone visit is not appropriate, you will not be charged for this service\".     Consent has been obtained for this service by the provider: Yes     Service Type: Individual      Session Start Time: 2:30 PM  Session End Time: 3:20 PM      Session Length: 50 Minutes     Session #: 42     Attendees: Client    Focus of Treatment Objective(s):  The client's presenting concerns included struggling to manage symptoms associated with major depressive disorder and generalized anxiety disorder.  Stage of change at time of discharge: ACTION (Actively working towards change)    Medication Adherence:  N/A    Chemical Use:  No    Assessment: Current Emotional / Mental Status (status of significant symptoms):    Risk status (Self / Other harm or suicidal ideation)  Client denies current fears or concerns for personal safety.  Client denies current or recent suicidal ideation " or behaviors.  Client denies current or recent homicidal ideation or behaviors.  Client denies current or recent self injurious behavior or ideation.  Client denies other safety concerns.    Attitude:   Cooperative   Orientation:   All  Speech   Rate / Production: Normal/ Responsive   Volume:  Normal   Mood:    Normal  Affect:    Appropriate   Thought Content:  Clear   Thought Form:  Coherent  Logical   Insight:   Fair     DSM5 Diagnoses: (Sustained by DSM5 Criteria Listed Above)  Diagnoses: 296.23 (F32.2) Major Depressive Disorder, Single Episode, Severe With Anxious Distress  300.02 (F41.1) Generalized Anxiety Disorder  Psychosocial & Contextual Factors: The client is a [age] old, , single male who resides with his mother and his father in a home in [place], [place]. The client is currently unemployed.    WHODAS 2.0 (12 item) Score: 52  Reason for Discharge:  This writer has resigned from [facility].    Aftercare Plan:  The client may resume counseling services at any time in the future by calling the [facility] intake office at [phone].      Elissa Mckeon, M.S.W., L.I.C.S.W.  November 24, 2021

## 2022-03-17 ENCOUNTER — OFFICE VISIT (OUTPATIENT)
Dept: FAMILY MEDICINE | Facility: CLINIC | Age: 32
End: 2022-03-17
Payer: COMMERCIAL

## 2022-03-17 VITALS
TEMPERATURE: 97.9 F | SYSTOLIC BLOOD PRESSURE: 130 MMHG | WEIGHT: 226.4 LBS | HEIGHT: 68 IN | OXYGEN SATURATION: 98 % | HEART RATE: 93 BPM | RESPIRATION RATE: 14 BRPM | DIASTOLIC BLOOD PRESSURE: 74 MMHG | BODY MASS INDEX: 34.31 KG/M2

## 2022-03-17 DIAGNOSIS — L70.9 ACNE, UNSPECIFIED ACNE TYPE: ICD-10-CM

## 2022-03-17 DIAGNOSIS — F41.1 GENERALIZED ANXIETY DISORDER: ICD-10-CM

## 2022-03-17 DIAGNOSIS — R06.02 SHORTNESS OF BREATH: ICD-10-CM

## 2022-03-17 DIAGNOSIS — L30.4 INTERTRIGO: Primary | ICD-10-CM

## 2022-03-17 PROCEDURE — 99213 OFFICE O/P EST LOW 20 MIN: CPT | Performed by: FAMILY MEDICINE

## 2022-03-17 RX ORDER — KETOCONAZOLE 20 MG/G
CREAM TOPICAL DAILY
Qty: 60 G | Refills: 1 | Status: SHIPPED | OUTPATIENT
Start: 2022-03-17

## 2022-03-17 ASSESSMENT — PAIN SCALES - GENERAL: PAINLEVEL: NO PAIN (0)

## 2022-03-17 ASSESSMENT — PATIENT HEALTH QUESTIONNAIRE - PHQ9
10. IF YOU CHECKED OFF ANY PROBLEMS, HOW DIFFICULT HAVE THESE PROBLEMS MADE IT FOR YOU TO DO YOUR WORK, TAKE CARE OF THINGS AT HOME, OR GET ALONG WITH OTHER PEOPLE: VERY DIFFICULT
SUM OF ALL RESPONSES TO PHQ QUESTIONS 1-9: 11
SUM OF ALL RESPONSES TO PHQ QUESTIONS 1-9: 11

## 2022-03-17 NOTE — PATIENT INSTRUCTIONS
Rash in groin: Apply ketoconazole once daily for 2-3 weeks until rash improves.   Keep area dry, use cotton underwear, allow to air dry when possible    For back-use benzoyl peroxide acne wash-can stain towels    Please call to re-establish with your counselor    Please let us know if ou want referral to general surgery to have cysts removed

## 2022-05-07 ENCOUNTER — HOSPITAL ENCOUNTER (EMERGENCY)
Facility: CLINIC | Age: 32
Discharge: HOME | End: 2022-05-07
Attending: EMERGENCY MEDICINE | Admitting: EMERGENCY MEDICINE
Payer: COMMERCIAL

## 2022-05-07 VITALS
OXYGEN SATURATION: 96 % | WEIGHT: 230 LBS | HEIGHT: 69 IN | DIASTOLIC BLOOD PRESSURE: 73 MMHG | SYSTOLIC BLOOD PRESSURE: 120 MMHG | BODY MASS INDEX: 34.07 KG/M2 | HEART RATE: 95 BPM | RESPIRATION RATE: 14 BRPM

## 2022-05-07 DIAGNOSIS — R20.0 RIGHT FACIAL NUMBNESS: ICD-10-CM

## 2022-05-07 PROCEDURE — 99283 EMERGENCY DEPT VISIT LOW MDM: CPT | Performed by: EMERGENCY MEDICINE

## 2022-05-07 PROCEDURE — 93010 ELECTROCARDIOGRAM REPORT: CPT | Performed by: EMERGENCY MEDICINE

## 2022-05-07 PROCEDURE — 93005 ELECTROCARDIOGRAM TRACING: CPT | Performed by: EMERGENCY MEDICINE

## 2022-05-07 PROCEDURE — 99282 EMERGENCY DEPT VISIT SF MDM: CPT | Mod: 25 | Performed by: EMERGENCY MEDICINE

## 2022-05-07 ASSESSMENT — ENCOUNTER SYMPTOMS
FEVER: 0
NUMBNESS: 1
ABDOMINAL PAIN: 0
SHORTNESS OF BREATH: 0

## 2022-05-07 NOTE — ED PROVIDER NOTES
History     Chief Complaint   Patient presents with     Numbness     HPI  Marvin Alves is a 32 year old male who has past medical history significant for anxiety, presenting emergency department for concerns regarding right-sided facial numbness.  Patient states that his sleeping habits are not typical, and he will sometimes sleep during the afternoon and evening hours.  Awoke at midnight.  Was laying on his right side, playing video games, and subsequently was experiencing right-sided facial numbness, involving the entire right side of the face.  No numbness, or tingling of the rest of the body.  However, later did state that he had some numbness of the base of the bilateral feet.  Patient walked downstairs, and his mother noted that patient was with some hyperventilation.  Patient then reports that he has had multiple other vague type symptoms with occasional chest pains, occasional bifrontal headache, and multiple other nonspecific occasional concerns.    Allergies:  No Known Allergies    Problem List:    Patient Active Problem List    Diagnosis Date Noted     Lump of skin of back 08/03/2021     Priority: Medium     Generalized anxiety disorder 08/03/2021     Priority: Medium     Depression, unspecified depression type 08/03/2021     Priority: Medium     Alcohol abuse counseling and surveillance 07/14/2016     Priority: Medium     Anxiety attack 07/14/2016     Priority: Medium     Obesity due to excess calories 07/14/2016     Priority: Medium     Tobacco abuse 07/14/2016     Priority: Medium        Past Medical History:    No past medical history on file.    Past Surgical History:    No past surgical history on file.    Family History:    Family History   Problem Relation Age of Onset     Diabetes Mother      Hypertension Mother      Diabetes Father      Depression Father      Diabetes Maternal Grandmother      Bipolar Disorder Brother      Schizophrenia Paternal Grandfather      Bipolar Disorder Paternal  "Uncle        Social History:  Marital Status:  Single [1]  Social History     Tobacco Use     Smoking status: Former Smoker     Packs/day: 1.00     Years: 5.00     Pack years: 5.00     Types: Cigarettes     Quit date: 2020     Years since quittin.7     Smokeless tobacco: Never Used   Vaping Use     Vaping Use: Never used   Substance Use Topics     Alcohol use: Not Currently     Alcohol/week: 0.0 standard drinks     Comment: occasional but once he starts drinking he continues to drink.     Drug use: Not Currently     Types: Marijuana     Comment: weed        Medications:    fluconazole (DIFLUCAN) 150 MG tablet  ketoconazole (NIZORAL) 2 % external cream  NO ACTIVE MEDICATIONS  order for DME          Review of Systems   Constitutional: Negative for fever.   Respiratory: Negative for shortness of breath.    Cardiovascular: Negative for chest pain.   Gastrointestinal: Negative for abdominal pain.   Neurological: Positive for numbness.   All other systems reviewed and are negative.      Physical Exam   BP: (!) 131/96  Pulse: 105  Resp: 14  Height: 175.3 cm (5' 9\")  Weight: 104.3 kg (230 lb)  SpO2: 94 %      Physical Exam  BP (!) 131/96   Pulse 105   Resp 14   Ht 1.753 m (5' 9\")   Wt 104.3 kg (230 lb)   SpO2 94%   BMI 33.97 kg/m    General: alert, interactive, in no apparent distress  Head: atraumatic  Nose: no rhinorrhea or epistaxis  Ears: no external auditory canal discharge or bleeding.    Eyes: Sclera nonicteric. Conjunctiva noninjected. PERRL, EOMI  Mouth: no tonsillar erythema, edema, or exudate  Neck: supple, no palp LAD  Lungs: CTAB  CV: RRR, S1/S2; peripheral pulses palpable and symmetric  Abdomen: soft, nt, nd, no guarding or rebound. Positive bowel sounds  Extremities: no cyanosis or edema  Skin: no rash or diaphoresis  Neuro: CN II-XII grossly intact, strength 5/5 in UE and LEs bilaterally, sensation intact to light touch in UE and LEs bilaterally;       ED Course     Mental Health Risk " Assessment      PSS-3    Date and Time Over the past 2 weeks have you felt down, depressed, or hopeless? Over the past 2 weeks have you had thoughts of killing yourself? Have you ever attempted to kill yourself? When did this last happen? User   05/07/22 0325 yes yes no -- KDR      C-SSRS ([place])    Date and Time Q1 Wished to be Dead (Past Month) Q2 Suicidal Thoughts (Past Month) Q3 Suicidal Thought Method Q4 Suicidal Intent without Specific Plan Q5 Suicide Intent with Specific Plan Q6 Suicide Behavior (Lifetime) Within the Past 3 Months? RETIRED: Level of Risk per Screen Screening Not Complete User   05/07/22 0325 yes no no no no no -- -- -- KDR                     Procedures  EKG, reviewed by myself shows sinus rhythm.  Rate 95 bpm.  Nonspecific HG-D-ppxemia changes.  No acute ischemic appearing changes.  Repolarization present in the anterior and lateral leads            Critical Care time:  none               No results found for this or any previous visit (from the past 24 hour(s)).    Medications - No data to display    Assessments & Plan (with Medical Decision Making)  32 year old male presenting to the emergency department with concerns regarding  Facial numbness, involving the right side of the face, present over the the past few hours.  Patient arrives slightly hypertensive, otherwise normal vitals.  Physical exam is otherwise normal, nonfocal, with no weakness which is noted.  No other numbness, or tingling elsewhere in the body.  Patient, later, does state that he has had multiple different vague type symptoms which have been occurring over the past many months.  States he has been frustrated because he goes to doctors, and is not able to get a definitive answer on many of his symptoms.  No infectious symptoms.  Otherwise well-appearing.  Normal neuro exam.  Plan is for discharge home, and follow-up in clinic as needed.  Encouraged to be seen if new or worsening symptoms develop.  Also encouraged to  follow-up in primary care clinic, and bring with a list of all of patient's concerns for further discussion.  Today's episode of uncertain exact etiology.  Perhaps was laying on the right side of his head/face causing some numbness.  Do not feel that stroke is likely.  Seizure unlikely.  Electrolyte abnormality unlikely     I have reviewed the nursing notes.    I have reviewed the findings, diagnosis, plan and need for follow up with the patient.       New Prescriptions    No medications on file       Final diagnoses:   Right facial numbness       5/7/2022   [facility] EMERGENCY DEPT     Freddie Wren MD  05/07/22 0411       Freddie Wren MD  05/07/22 0411       Freddie Wren MD  05/07/22 0458

## 2022-05-07 NOTE — ED TRIAGE NOTES
Patient reports right facial numbness that started 30 minutes ago. Also states he felt like his heart was racing after the numbness started.     Triage Assessment     Row Name 05/07/22 0323       Triage Assessment (Adult)    Airway WDL WDL       Respiratory WDL    Respiratory WDL WDL       Skin Circulation/Temperature WDL    Skin Circulation/Temperature WDL WDL       Cardiac WDL    Cardiac WDL WDL       Peripheral/Neurovascular WDL    Peripheral Neurovascular WDL neurovascular assessment upper       RUE Neurovascular Assessment    Sensation RUE numbness present  right face       Cognitive/Neuro/Behavioral WDL    Cognitive/Neuro/Behavioral WDL all    Level of Consciousness alert    Orientation oriented x 4    Speech clear    Mood/Behavior anxious;behavior appropriate to situation       Pupils (CN II)    Pupil PERRLA yes

## 2022-05-13 ENCOUNTER — OFFICE VISIT (OUTPATIENT)
Dept: FAMILY MEDICINE | Facility: CLINIC | Age: 32
End: 2022-05-13
Payer: COMMERCIAL

## 2022-05-13 VITALS
DIASTOLIC BLOOD PRESSURE: 70 MMHG | SYSTOLIC BLOOD PRESSURE: 122 MMHG | OXYGEN SATURATION: 100 % | TEMPERATURE: 98.5 F | RESPIRATION RATE: 14 BRPM | HEIGHT: 69 IN | HEART RATE: 100 BPM | BODY MASS INDEX: 32.7 KG/M2 | WEIGHT: 220.8 LBS

## 2022-05-13 DIAGNOSIS — R20.0 FACIAL NUMBNESS: Primary | ICD-10-CM

## 2022-05-13 PROCEDURE — 99214 OFFICE O/P EST MOD 30 MIN: CPT | Performed by: FAMILY MEDICINE

## 2022-05-13 ASSESSMENT — PAIN SCALES - GENERAL: PAINLEVEL: NO PAIN (0)

## 2022-05-13 NOTE — PATIENT INSTRUCTIONS
Rash in groin: Apply ketoconazole once daily for 2-3 weeks until rash improves.   Keep area dry, use cotton underwear, allow to air dry when possible     For back-use benzoyl peroxide acne wash-can stain towels

## 2022-05-13 NOTE — PROGRESS NOTES
"  Assessment & Plan     Facial numbness  Ddx includes TN, infectious etiology, dental pain vs headaches. Based on clinical history and distribution of pain  Possible component of TN, however with pain starting from right side and progressing over to left I am unsure if this is correct diagnosis. I did consider possible sinus infection however no symptoms or signs on exam. His neuro exam is normal. I did discuss with patient and mom and after discussion elected to continue to monitor symptoms. Discussed warning signs and if symptoms persist or worsen he will be re-evaluated.     Discussed importance of good posture, encouraged him to get up and stretch if playing video games for hours.     Due for eye and dental exam. Plan to check in in 2 months for annual exam      I spent 30 minutes on patient encounter    BMI:   Estimated body mass index is 32.61 kg/m  as calculated from the following:    Height as of this encounter: 1.753 m (5' 9\").    Weight as of this encounter: 100.2 kg (220 lb 12.8 oz).   Weight management plan: Discussed healthy diet and exercise guidelines      No follow-ups on file.    CARMINE FRAUSTO DO  [facility]   Marvin is a 32 year old who presents for the following health issues.    [facility]     ED/UC Followup:    Facility:  [facility]  Date of visit: 5/7/2022  Reason for visit: Right facial numbness  Current Status: Has had facial numbness everyday since his ER visit, but not today.       Marvin and his mother present in follow up from the ER today.    Reports right facial numbness-primarily over right eye and sometimes into forehead, pain will sometimes progress across face to left. Pain is described as numbness, when he has episodes will last about 1 hour or so. Does not have times where pain is worse. Sometimes will notice if he sleeps on that side. Denies numbness or weakness in other areas of body. Denies change in vision-is due for eye " "exam however.     Does play video games for hours at times. Has not been working out as much.     Will sometimes have intermittent pains through out body.     Good about drinking water. Rash has improved. Sleeping and eating well.       Review of Systems   Constitutional, HEENT, cardiovascular, pulmonary, gi and gu systems are negative, except as otherwise noted.      Objective    /70 (BP Location: Right arm, Patient Position: Chair, Cuff Size: Adult Large)   Pulse 100   Temp 98.5  F (36.9  C) (Tympanic)   Resp 14   Ht 1.753 m (5' 9\")   Wt 100.2 kg (220 lb 12.8 oz)   SpO2 100%   BMI 32.61 kg/m    Body mass index is 32.61 kg/m .  Physical Exam  Constitutional:       General: He is not in acute distress.     Appearance: He is well-developed.   HENT:      Right Ear: Tympanic membrane and external ear normal.      Left Ear: Tympanic membrane and external ear normal.      Nose: Nose normal.      Mouth/Throat:      Mouth: No oral lesions.      Pharynx: No oropharyngeal exudate.   Eyes:      General:         Right eye: No discharge.         Left eye: No discharge.      Conjunctiva/sclera: Conjunctivae normal.      Pupils: Pupils are equal, round, and reactive to light.   Neck:      Thyroid: No thyromegaly.      Trachea: No tracheal deviation.   Cardiovascular:      Rate and Rhythm: Normal rate and regular rhythm.      Pulses: Normal pulses.      Heart sounds: Normal heart sounds, S1 normal and S2 normal. No murmur heard.    No S3 or S4 sounds.   Pulmonary:      Effort: Pulmonary effort is normal. No respiratory distress.      Breath sounds: Normal breath sounds. No wheezing or rales.   Abdominal:      General: Bowel sounds are normal.      Palpations: Abdomen is soft. There is no mass.      Tenderness: There is no abdominal tenderness.   Musculoskeletal:         General: No deformity. Normal range of motion.      Cervical back: Neck supple.   Lymphadenopathy:      Cervical: No cervical adenopathy.   Skin:     " General: Skin is warm and dry.      Findings: No lesion or rash.   Neurological:      Mental Status: He is alert and oriented to person, place, and time.      Motor: No abnormal muscle tone.      Deep Tendon Reflexes: Reflexes are normal and symmetric.   Psychiatric:         Speech: Speech normal.         Thought Content: Thought content normal.         Judgment: Judgment normal.

## 2023-02-10 ENCOUNTER — VIRTUAL VISIT (OUTPATIENT)
Dept: FAMILY MEDICINE | Facility: CLINIC | Age: 33
End: 2023-02-10
Payer: COMMERCIAL

## 2023-02-10 DIAGNOSIS — U07.1 INFECTION DUE TO 2019 NOVEL CORONAVIRUS: Primary | ICD-10-CM

## 2023-02-10 PROCEDURE — 99213 OFFICE O/P EST LOW 20 MIN: CPT | Mod: CS | Performed by: FAMILY MEDICINE

## 2023-02-10 NOTE — PATIENT INSTRUCTIONS
COVID-19 Outpatient Treatments  Your care team can help you find the best treatments for COVID-19. Talk to a health care provider or refer to the FDA medicine fact sheets below.    Important: You can't have Paxlovid or molnupiravir if you're starting the medicine more than 5 days after your symptoms have started.  Paxlovid: https://www.fda.gov/media/147201/download  Molnupiravir (Lagevrio): https://www.fda.gov/media/557754/download  Paxlovid (nimatrelvir and ritonavir)  How it works  Two medicines (nirmatrelvir and ritonavir) are taken together. They stop the virus from growing. Less amount of virus is easier for your body to fight.  Benefits  Lowers risk of a hospital stay or death from COVID-19.  How to take    Medicine comes in a daily container with both medicine tablets. Take by mouth twice daily (once in the morning, once at night) for 5 days.    The number of tablets to take varies by patient.    Don't chew or break capsules. Swallow whole.  When to take  Take as soon as possible after positive COVID-19 test result, and within 5 days of your first symptoms.  Who can take it  Patients must be 12 years or older, weigh at least 88 pounds, and have tested positive for COVID-19. Paxlovid is the preferred treatment for pregnant patients.  Possible side effects  Can cause altered sense of taste, diarrhea (loose, watery stools), high blood pressure, muscle aches.  Medicine conflicts    Some medicines may conflict with Paxlovid and may cause serious side effects.    Tell your care team about all the medicines you take, including prescription and over-the-counter medicines, vitamins, and herbal supplements.    Your care team will review your medicines to make sure that you can safely take Paxlovid.  Cautions    Paxlovid is not advised for patients with severe kidney or liver disease. If you have kidney or liver problems, the dose may need to be changed.    If you're pregnant or breastfeeding, talk to your care team  about your options.    If you take hormonal birth control (such as the Pill), then you or your partner should also use a non-hormonal form of birth control (such as a condom). Keep doing this for 1 menstrual cycle after your last dose of Paxlovid.  Molnupiravir (Lagevrio)  How it works  Stops the virus from growing. Less amount of virus is easier for your body to fight.  Benefits  Lowers risk of a hospital stay or death from COVID-19.  How to take  Take 4 capsules by mouth every 12 hours (4 in the morning and 4 at night) for 5 days. Don't chew or break capsules. Swallow whole.  When to take  Take as soon as possible after positive COVID-19 test result, and within 5 days of your first symptoms.  Who can take it  Patients must be 18 years or older and have tested positive for COVID-19.  Possible side effects  Diarrhea (loose, watery stools), nausea (feeling sick to your stomach), dizziness, headaches.  Medicine conflicts  Right now, there are no known conflicts with other drugs. But tell your care team about all medicines you take.  Cautions    This medicine is not advised for patients who are pregnant.    If you are someone who could become pregnant, use trusted birth control until 4 days after your last dose of molnupiravir.    If your partner could become pregnant, you should use trusted birth control until 3 months after your last dose of molnupiravir.  For informational purposes only. Not to replace the advice of your health care provider. Copyright   2022 [facility]. All rights reserved. Clinically reviewed by Danii Bro, PharmD, BCACP. [employer] [id number] - REV 12/22.

## 2023-02-10 NOTE — PROGRESS NOTES
Marvin is a 32 year old who is being evaluated via a billable telephone visit.      What phone number would you like to be contacted at? [phone]  How would you like to obtain your AVS? Sandrita  Distant Location (provider location):  On-site    Assessment & Plan     Infection due to 2019 novel coronavirus  After discussion patient would like to proceed with paxlovid prescription. He is high risk due to his BMI of 32. He does not take any chronic medications-so no drug interactions and reviewed expected side effects from PAxlovid. Discussed to increase fluids and extra rest. reviewed safe OTC medications. IF symptoms are worsening recommended more prompt evaluation.   - nirmatrelvir and ritonavir (PAXLOVID) therapy pack  Dispense: 30 tablet; Refill: 0          Return if symptoms worsen or fail to improve.    CARMINE FRAUSTO, [facility]   Marvin is a 32 year old, presenting for the following health issues:  Covid Concern    HPI     COVID-19 Symptom Review  How many days ago did these symptoms start? 5 days.  Tested positive yesterday    Are any of the following symptoms significant for you?    New or worsening difficulty breathing? No    Worsening cough? Yes, I am coughing up mucus.    Fever or chills? Yes, I felt feverish or had chills. Had a fever for 1 day    Headache: YES    Sore throat: YES- started with a scratchy throat    Chest pain: No    Diarrhea: No    Body aches? No    What treatments has patient tried? none   Does patient live in a nursing home, group home, or shelter? No  Does patient have a way to get food/medications during quarantined? Yes, I have a friend or family member who can help me.      Review of Systems   Constitutional, HEENT, cardiovascular, pulmonary, gi and gu systems are negative, except as otherwise noted.      Objective           Vitals:  No vitals were obtained today due to virtual visit.    Physical Exam     RESP: No cough, no audible  wheezing, able to talk in full sentences  Remainder of exam unable to be completed due to telephone visits        Phone call duration: 10 minutes

## 2023-03-02 ENCOUNTER — OFFICE VISIT (OUTPATIENT)
Dept: FAMILY MEDICINE | Facility: CLINIC | Age: 33
End: 2023-03-02
Payer: COMMERCIAL

## 2023-03-02 VITALS
WEIGHT: 243.4 LBS | DIASTOLIC BLOOD PRESSURE: 74 MMHG | OXYGEN SATURATION: 97 % | BODY MASS INDEX: 36.05 KG/M2 | HEIGHT: 69 IN | TEMPERATURE: 98 F | RESPIRATION RATE: 16 BRPM | SYSTOLIC BLOOD PRESSURE: 128 MMHG | HEART RATE: 102 BPM

## 2023-03-02 DIAGNOSIS — Z13.220 SCREENING FOR HYPERLIPIDEMIA: ICD-10-CM

## 2023-03-02 DIAGNOSIS — Z00.00 ROUTINE GENERAL MEDICAL EXAMINATION AT A HEALTH CARE FACILITY: Primary | ICD-10-CM

## 2023-03-02 DIAGNOSIS — F41.1 GENERALIZED ANXIETY DISORDER: ICD-10-CM

## 2023-03-02 LAB
CHOLEST SERPL-MCNC: 178 MG/DL
HBA1C MFR BLD: 5.4 % (ref 0–5.6)
HDLC SERPL-MCNC: 58 MG/DL
LDLC SERPL CALC-MCNC: 109 MG/DL
NONHDLC SERPL-MCNC: 120 MG/DL
TRIGL SERPL-MCNC: 55 MG/DL

## 2023-03-02 PROCEDURE — 99395 PREV VISIT EST AGE 18-39: CPT | Performed by: FAMILY MEDICINE

## 2023-03-02 PROCEDURE — 83036 HEMOGLOBIN GLYCOSYLATED A1C: CPT | Performed by: FAMILY MEDICINE

## 2023-03-02 PROCEDURE — 36415 COLL VENOUS BLD VENIPUNCTURE: CPT | Performed by: FAMILY MEDICINE

## 2023-03-02 PROCEDURE — 80061 LIPID PANEL: CPT | Performed by: FAMILY MEDICINE

## 2023-03-02 ASSESSMENT — PATIENT HEALTH QUESTIONNAIRE - PHQ9
SUM OF ALL RESPONSES TO PHQ QUESTIONS 1-9: 14
SUM OF ALL RESPONSES TO PHQ QUESTIONS 1-9: 14
10. IF YOU CHECKED OFF ANY PROBLEMS, HOW DIFFICULT HAVE THESE PROBLEMS MADE IT FOR YOU TO DO YOUR WORK, TAKE CARE OF THINGS AT HOME, OR GET ALONG WITH OTHER PEOPLE: VERY DIFFICULT

## 2023-03-02 ASSESSMENT — ENCOUNTER SYMPTOMS
CONSTIPATION: 1
DIZZINESS: 1
MYALGIAS: 1
NERVOUS/ANXIOUS: 1
DIARRHEA: 0
HEMATURIA: 0
PARESTHESIAS: 1
HEARTBURN: 1
PALPITATIONS: 1
NAUSEA: 0
HEMATOCHEZIA: 0
WEAKNESS: 1
FREQUENCY: 1
JOINT SWELLING: 1
COUGH: 0
ABDOMINAL PAIN: 1
DYSURIA: 0
SORE THROAT: 0
EYE PAIN: 1
CHILLS: 1
HEADACHES: 1
FEVER: 0
ARTHRALGIAS: 1
SHORTNESS OF BREATH: 0

## 2023-03-02 ASSESSMENT — PAIN SCALES - GENERAL: PAINLEVEL: MILD PAIN (2)

## 2023-03-02 NOTE — LETTER
March 3, 2023      Juancarlos Alves  [address] [place] [id number]        Dear ,    We are writing to inform you of your test results.    Labs are acceptable.       Resulted Orders   Lipid panel reflex to direct LDL Non-fasting   Result Value Ref Range    Cholesterol 178 <200 mg/dL    Triglycerides 55 <150 mg/dL    Direct Measure HDL 58 >=40 mg/dL    LDL Cholesterol Calculated 109 (H) <=100 mg/dL    Non HDL Cholesterol 120 <130 mg/dL    Narrative    Cholesterol  Desirable:  <200 mg/dL    Triglycerides  Normal:  Less than 150 mg/dL  Borderline High:  150-199 mg/dL  High:  200-499 mg/dL  Very High:  Greater than or equal to 500 mg/dL    Direct Measure HDL  Female:  Greater than or equal to 50 mg/dL   Male:  Greater than or equal to 40 mg/dL    LDL Cholesterol  Desirable:  <100mg/dL  Above Desirable:  100-129 mg/dL   Borderline High:  130-159 mg/dL   High:  160-189 mg/dL   Very High:  >= 190 mg/dL    Non HDL Cholesterol  Desirable:  130 mg/dL  Above Desirable:  130-159 mg/dL  Borderline High:  160-189 mg/dL  High:  190-219 mg/dL  Very High:  Greater than or equal to 220 mg/dL   Hemoglobin A1c   Result Value Ref Range    Hemoglobin A1C 5.4 0.0 - 5.6 %      Comment:      Normal <5.7%   Prediabetes 5.7-6.4%    Diabetes 6.5% or higher     Note: Adopted from ADA consensus guidelines.       If you have any questions or concerns, please call the clinic at the number listed above.       Sincerely,      Farhana Martinez, DO

## 2023-03-02 NOTE — PROGRESS NOTES
"SUBJECTIVE:   CC: Juacnarlos is an 32 year old who presents for preventative health visit.     Patient has been advised of split billing requirements and indicates understanding: Yes  Healthy Habits:     Getting at least 3 servings of Calcium per day:  Yes    Bi-annual eye exam:  NO    Dental care twice a year:  NO    Sleep apnea or symptoms of sleep apnea:  Daytime drowsiness    Diet:  Carbohydrate counting    Frequency of exercise:  2-3 days/week    Duration of exercise:  30-45 minutes    Taking medications regularly:  Not Applicable    Medication side effects:  Not applicable    PHQ-2 Total Score: 4    Additional concerns today:  Yes    * Has been having a headache, more so in a vein on the side of his head-some increase after COVID. Did take aleve which helped.     Chest pain for \"years.\"  Pain will be in morning, sometimes greasier food will exacerbate it.     Weight has increased slightly since last visit.     He and most of his family ha COVID since last visit.     Does notice some underlying anxiety. He has been on medications and undergone counseling in the [facility]. Currently he feels his symptoms are well managed. Not interested in pursuing meds or counseling. He used to drink more heavily-felt this worsened his anxiety.     Today's PHQ-2 Score:   PHQ-2 ( 1999 Pfizer) 3/2/2023   Q1: Little interest or pleasure in doing things 2   Q2: Feeling down, depressed or hopeless 2   PHQ-2 Score 4   Q1: Little interest or pleasure in doing things More than half the days   Q2: Feeling down, depressed or hopeless More than half the days   PHQ-2 Score 4     PHQ 8/3/2021 3/17/2022 3/2/2023   PHQ-9 Total Score 17 11 14   Q9: Thoughts of better off dead/self-harm past 2 weeks Several days Several days Several days   F/U: Thoughts of suicide or self-harm - No Yes   F/U: Self harm-plan - - No   F/U: Self-harm action - - No   F/U: Safety concerns - No No       Social History     Tobacco Use     Smoking status: Former     Packs/day: " "1.00     Years: 5.00     Pack years: 5.00     Types: Cigarettes     Quit date: 2020     Years since quittin.6     Smokeless tobacco: Never   Substance Use Topics     Alcohol use: Not Currently     Alcohol/week: 0.0 standard drinks     Comment: occasional but once he starts drinking he continues to drink.       Alcohol Use 3/2/2023   Prescreen: >3 drinks/day or >7 drinks/week? Not Applicable       Last PSA: No results found for: PSA    Reviewed orders with patient. Reviewed health maintenance and updated orders accordingly - Yes      Reviewed and updated as needed this visit by clinical staff   Tobacco  Allergies  Meds              Reviewed and updated as needed this visit by Provider                 No past medical history on file.   No past surgical history on file.    Review of Systems   Constitutional: Positive for chills. Negative for fever.   HENT: Positive for ear pain and hearing loss. Negative for congestion and sore throat.    Eyes: Positive for pain and visual disturbance.   Respiratory: Negative for cough and shortness of breath.    Cardiovascular: Positive for chest pain, palpitations and peripheral edema.   Gastrointestinal: Positive for abdominal pain, constipation and heartburn. Negative for diarrhea, hematochezia and nausea.   Genitourinary: Positive for frequency and impotence. Negative for dysuria, genital sores, hematuria, penile discharge and urgency.   Musculoskeletal: Positive for arthralgias, joint swelling and myalgias.   Skin: Positive for rash.   Neurological: Positive for dizziness, weakness, headaches and paresthesias.   Psychiatric/Behavioral: Positive for mood changes. The patient is nervous/anxious.      OBJECTIVE:   /74 (BP Location: Right arm, Patient Position: Chair, Cuff Size: Adult Large)   Pulse 102   Temp 98  F (36.7  C) (Tympanic)   Resp 16   Ht 1.753 m (5' 9\")   Wt 110.4 kg (243 lb 6.4 oz)   SpO2 97%   BMI 35.94 kg/m      Physical Exam  Constitutional: " "      Appearance: Normal appearance.   HENT:      Head: Normocephalic.   Eyes:      Conjunctiva/sclera: Conjunctivae normal.   Cardiovascular:      Rate and Rhythm: Normal rate and regular rhythm.      Pulses: Normal pulses.   Pulmonary:      Effort: Pulmonary effort is normal.   Abdominal:      General: Bowel sounds are normal.   Musculoskeletal:      Right lower leg: No edema.      Left lower leg: No edema.   Skin:     General: Skin is warm and dry.   Neurological:      Mental Status: He is alert.   Psychiatric:         Thought Content: Thought content normal.         Judgment: Judgment normal.         ASSESSMENT/PLAN:   Marvin was seen today for physical.    Diagnoses and all orders for this visit:    Routine general medical examination at a health care facility  Encouraged to get back into exercise routine after recovering from COVID. Encouraged balanced duet and hydration.  Encouraged to schedule dentist appt.  -     Hemoglobin A1c; Future  -     Hemoglobin A1c    Screening for hyperlipidemia  -     Lipid panel reflex to direct LDL Non-fasting; Future  -     Lipid panel reflex to direct LDL Non-fasting    Generalized anxiety disorder  Feels he is managing currently. Has supportive family and sibling.Uses some breathing techniques he has learned in the past.       Patient has been advised of split billing requirements and indicates understanding: Yes      COUNSELING:   Reviewed preventive health counseling, as reflected in patient instructions       Regular exercise       Healthy diet/nutrition       Vision screening       Alcohol Use       BMI:   Estimated body mass index is 35.94 kg/m  as calculated from the following:    Height as of this encounter: 1.753 m (5' 9\").    Weight as of this encounter: 110.4 kg (243 lb 6.4 oz).   Weight management plan: Discussed healthy diet and exercise guidelines      He reports that he quit smoking about 2 years ago. His smoking use included cigarettes. He has a 5.00 pack-year " smoking history. He has never used smokeless tobacco.        DO MERCEDES ARREOLA [facility]

## 2023-03-02 NOTE — PATIENT INSTRUCTIONS
-Dentist  -Set up appt with counselor  -Get back into exercising/good diet     Preventive Health Recommendations  Male Ages 26 - 39    Yearly exam:             See your health care provider every year in order to  o   Review health changes.   o   Discuss preventive care.    o   Review your medicines if your doctor has prescribed any.  You should be tested each year for STDs (sexually transmitted diseases), if you re at risk.   After age 35, talk to your provider about cholesterol testing. If you are at risk for heart disease, have your cholesterol tested at least every 5 years.   If you are at risk for diabetes, you should have a diabetes test (fasting glucose).  Shots: Get a flu shot each year. Get a tetanus shot every 10 years.     Nutrition:  Eat at least 5 servings of fruits and vegetables daily.   Eat whole-grain bread, whole-wheat pasta and brown rice instead of white grains and rice.   Get adequate Calcium and Vitamin D.     Lifestyle  Exercise for at least 150 minutes a week (30 minutes a day, 5 days a week). This will help you control your weight and prevent disease.   Limit alcohol to one drink per day.   No smoking.   Wear sunscreen to prevent skin cancer.   See your dentist every six months for an exam and cleaning.     
16

## 2023-11-15 ENCOUNTER — OFFICE VISIT (OUTPATIENT)
Dept: FAMILY MEDICINE | Facility: CLINIC | Age: 33
End: 2023-11-15
Payer: COMMERCIAL

## 2023-11-15 VITALS
HEIGHT: 69 IN | RESPIRATION RATE: 16 BRPM | TEMPERATURE: 98 F | DIASTOLIC BLOOD PRESSURE: 78 MMHG | BODY MASS INDEX: 38.69 KG/M2 | HEART RATE: 87 BPM | WEIGHT: 261.2 LBS | OXYGEN SATURATION: 97 % | SYSTOLIC BLOOD PRESSURE: 138 MMHG

## 2023-11-15 DIAGNOSIS — D17.30 LIPOMA OF SKIN AND SUBCUTANEOUS TISSUE: ICD-10-CM

## 2023-11-15 DIAGNOSIS — M54.9 UPPER BACK PAIN: Primary | ICD-10-CM

## 2023-11-15 DIAGNOSIS — F41.1 GENERALIZED ANXIETY DISORDER: ICD-10-CM

## 2023-11-15 PROCEDURE — 99213 OFFICE O/P EST LOW 20 MIN: CPT | Performed by: FAMILY MEDICINE

## 2023-11-15 ASSESSMENT — PATIENT HEALTH QUESTIONNAIRE - PHQ9
10. IF YOU CHECKED OFF ANY PROBLEMS, HOW DIFFICULT HAVE THESE PROBLEMS MADE IT FOR YOU TO DO YOUR WORK, TAKE CARE OF THINGS AT HOME, OR GET ALONG WITH OTHER PEOPLE: VERY DIFFICULT
SUM OF ALL RESPONSES TO PHQ QUESTIONS 1-9: 16
SUM OF ALL RESPONSES TO PHQ QUESTIONS 1-9: 16

## 2023-11-15 ASSESSMENT — PAIN SCALES - GENERAL: PAINLEVEL: NO PAIN (0)

## 2023-11-15 NOTE — PROGRESS NOTES
"  Assessment & Plan     Upper back pain  Encouraged good posture and general stretching. PT referral  - Physical Therapy Referral    Lipoma of skin and subcutaneous tissue  - Adult General Surg Referral    Generalized anxiety disorder  Would be interested in reestablishing with counselor.   - Adult Mental Health  Referral       BMI:   Estimated body mass index is 38.57 kg/m  as calculated from the following:    Height as of this encounter: 1.753 m (5' 9\").    Weight as of this encounter: 118.5 kg (261 lb 3.2 oz).   Weight management plan: Discussed healthy diet and exercise guidelines    Depression Screening Follow Up        11/15/2023     2:42 PM   PHQ   PHQ-9 Total Score 16   Q9: Thoughts of better off dead/self-harm past 2 weeks Several days   F/U: Thoughts of suicide or self-harm No   F/U: Safety concerns No     Follow Up      Follow Up Actions Taken  Crisis resource information provided in the After Visit Summary  Scheduled appointment with [facility]    Discussed the following ways the patient can remain in a safe environment:  be around others      CARMINE FRAUSTO DO  [facility]    Jose Alberto Bauer is a 33 year old, presenting for the following health issues:  Derm Problem        11/15/2023     2:53 PM   Additional Questions   Roomed by Dina CARPIO MA   Accompanied by mother       History of Present Illness       Headaches:   Since the patient's last clinic visit, headaches are: no change  The patient is getting headaches:  3 per week  He is not able to do normal daily activities when he has a migraine.  The patient is taking the following rescue/relief medications:  Naproxyn (Aleve)   Patient states \"I get some relief\" from the rescue/relief medications.   The patient is taking the following medications to prevent migraines:  No medications to prevent migraines  In the past 4 weeks, the patient has gone to an Urgent Care or Emergency Room 0 times times due to headaches.    Reason " "for visit:  Cyst    He eats 2-3 servings of fruits and vegetables daily.He consumes 1 sweetened beverage(s) daily.He exercises with enough effort to increase his heart rate 20 to 29 minutes per day.  He exercises with enough effort to increase his heart rate 3 or less days per week.   He is taking medications regularly.   \    Derm Problem-  Has had a cyst on his back for at least 5 years as well as one in his left axillary area.  States had a consult years ago and was told it was a tumor and then it was benign.  Was first seen in clinic for this on 1/17/2017 and then saw general surgery on 1/27/2017: Assessment and plan:  26-year-old male with what appears to feel like a lipoma. Unfortunately, this is deep enough and large enough that I would Recommend taking it out  In the operating room  Under light sedation. I discussed this with patient and and would like to delay this  If necessary. I explained that there is A little low likelihood that this is malignant. The patient would like to keep an eye on it and return to clinic if it changes. .    Has noticed some pain in his upper back, moreso left sided. Will notice it when sitting and playing video games.     Feels his anxiety has worsened some, not interested in medications-tried these in the past and didn't like how they made him feel. He did see counselor here virtually and found it helpful would consider doing that again.       Review of Systems   Constitutional, HEENT, cardiovascular, pulmonary, gi and gu systems are negative, except as otherwise noted.      Objective    /78 (BP Location: Right arm, Patient Position: Chair, Cuff Size: Adult Large)   Pulse 87   Temp 98  F (36.7  C) (Tympanic)   Resp 16   Ht 1.753 m (5' 9\")   Wt 118.5 kg (261 lb 3.2 oz)   SpO2 97%   BMI 38.57 kg/m    Body mass index is 38.57 kg/m .  Physical Exam  Constitutional:       Appearance: Normal appearance.   HENT:      Head: Normocephalic.   Cardiovascular:      Rate and " Rhythm: Normal rate and regular rhythm.   Pulmonary:      Effort: Pulmonary effort is normal.      Breath sounds: Normal breath sounds.   Musculoskeletal:      Comments: ;eft sided mid back lipoma, mobile   Skin:     General: Skin is warm and dry.   Neurological:      Mental Status: He is alert.

## 2023-12-14 ENCOUNTER — THERAPY VISIT (OUTPATIENT)
Dept: PHYSICAL THERAPY | Facility: CLINIC | Age: 33
End: 2023-12-14
Attending: FAMILY MEDICINE
Payer: COMMERCIAL

## 2023-12-14 DIAGNOSIS — M54.9 UPPER BACK PAIN: ICD-10-CM

## 2023-12-14 PROCEDURE — 97110 THERAPEUTIC EXERCISES: CPT | Mod: GP

## 2023-12-14 PROCEDURE — 97140 MANUAL THERAPY 1/> REGIONS: CPT | Mod: GP

## 2023-12-14 PROCEDURE — 97161 PT EVAL LOW COMPLEX 20 MIN: CPT | Mod: GP

## 2023-12-14 NOTE — PROGRESS NOTES
PHYSICAL THERAPY EVALUATION  Type of Visit: Evaluation    See electronic medical record for Abuse and Falls Screening details.    Subjective       Presenting condition or subjective complaint: Pt had pain in the neck and L>R UT radiating to the lat arms, numbness/tingling in hands and forearms and sometimes feet last year for two months. Pt did a lot of stretching and strengthening. It got better but then returned in Oct 2023 for 2 weeks. He is close to painfree and almost didn't come today, but would like to get a HEP to prevent it from happening again. 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 He realizes that is most likely the cause of his pain.  Date of onset: 10/15/23    Relevant medical history: Dizziness; Mental Illness; Migraines or headaches; Neck injury; Overweight; Severe headaches; Smoking   Dates & types of surgery:      Prior diagnostic imaging/testing results:       Prior therapy history for the same diagnosis, illness or injury: No      Prior Level of Function  Independent    Living Environment  Social support: With family members   Type of home: House; 2-story   Stairs to enter the home: No       Ramp: No   Stairs inside the home: Yes 14 Is there a railing: Yes   Help at home: None  Equipment owned:       Employment: No    Hobbies/Interests: music    Patient goals for therapy: Play instruments/sleep comfortably    Pain assessment: Pain present  Location: post neck/L UT/Ratin/10     Objective   CERVICAL SPINE EVALUATION  PAIN: Pain Level at Rest: 0/10  Pain Level with Use: 9/10  Pain Location: cervical spine  Pain Quality: Aching, Stabbing, and pinching  Pain Frequency: intermittent  Pain is Worst: middle of the night  Pain is Exacerbated By: working on computer  Pain is Relieved By: rest and stretch  Pain Progression:  Improved  POSTURE: Sitting Posture: Rounded shoulders, Forward head  GAIT:   normal  ROM: AROM WNL  FLEXIBILITY:  mildly tight L scalenes, cervical paraspinals, UT    SPECIAL TESTS: WFL  PALPATION:  tender   L scalenes, cervical paraspinals, UT     Assessment & Plan   CLINICAL IMPRESSIONS  Medical Diagnosis: Upper back pain    Treatment Diagnosis: L neck and upper back pain   Impression/Assessment: Patient is a 33 year old male with L neck and upper back complaints.  The following significant findings have been identified: Pain and Decreased ROM/flexibility. These impairments interfere with their ability to perform work tasks and recreational activities as compared to previous level of function.     Clinical Decision Making (Complexity):  Clinical Presentation: Stable/Uncomplicated  Clinical Presentation Rationale: based on medical and personal factors listed in PT evaluation  Clinical Decision Making (Complexity): Low complexity    PLAN OF CARE  Treatment Interventions:  Interventions: Manual Therapy, Therapeutic Exercise    Long Term Goals     PT Goal 1  Goal Identifier: 1  Goal Description: Pt will be independent with HEP for optimal functional recovery.  Target Date: 12/14/23  Date Met: 12/14/23      Frequency of Treatment: 1 visit only  Duration of Treatment:      Education Assessment:   Learner/Method: Patient;Listening;Demonstration;Pictures/Video;No Barriers to Learning    Risks and benefits of evaluation/treatment have been explained.   Patient/Family/caregiver agrees with Plan of Care.     Evaluation Time:     PT Eval, Low Complexity Minutes (44831): 20       Signing Clinician: Neyda Nye PT      [facility]                                                                                   OUTPATIENT PHYSICAL THERAPY      PLAN OF TREATMENT FOR OUTPATIENT REHABILITATION   Patient's Last Name, First Name, Marvin Burns YOB: 1990   Provider's Name   [facility]ERC[facility]ES  [facility]   Medical Record No.  [id number]     Onset Date: 10/15/23  Start of Care Date: 12/14/23     Medical Diagnosis:  Upper back pain      PT Treatment Diagnosis:  L neck and upper back pain Plan of Treatment  Frequency/Duration: 1 visit only/      Certification date from 12/14/23 to 12/14/23         See note for plan of treatment details and functional goals     Neyda Nye, PT                         I CERTIFY THE NEED FOR THESE SERVICES FURNISHED UNDER        THIS PLAN OF TREATMENT AND WHILE UNDER MY CARE     (Physician attestation of this document indicates review and certification of the therapy plan).              Referring Provider:  Farhana Martinez    Initial Assessment  See Epic Evaluation- Start of Care Date: 12/14/23

## 2024-08-30 ENCOUNTER — OFFICE VISIT (OUTPATIENT)
Dept: FAMILY MEDICINE | Facility: CLINIC | Age: 34
End: 2024-08-30
Payer: COMMERCIAL

## 2024-08-30 VITALS
RESPIRATION RATE: 16 BRPM | SYSTOLIC BLOOD PRESSURE: 138 MMHG | BODY MASS INDEX: 37.47 KG/M2 | WEIGHT: 253 LBS | HEART RATE: 109 BPM | OXYGEN SATURATION: 97 % | HEIGHT: 69 IN | DIASTOLIC BLOOD PRESSURE: 68 MMHG

## 2024-08-30 DIAGNOSIS — G43.809 OTHER MIGRAINE WITHOUT STATUS MIGRAINOSUS, NOT INTRACTABLE: ICD-10-CM

## 2024-08-30 DIAGNOSIS — F41.1 GAD (GENERALIZED ANXIETY DISORDER): Primary | ICD-10-CM

## 2024-08-30 PROCEDURE — 99214 OFFICE O/P EST MOD 30 MIN: CPT | Performed by: FAMILY MEDICINE

## 2024-08-30 RX ORDER — HYDROXYZINE HYDROCHLORIDE 25 MG/1
25 TABLET, FILM COATED ORAL 3 TIMES DAILY PRN
Qty: 30 TABLET | Refills: 1 | Status: CANCELLED | OUTPATIENT
Start: 2024-08-30

## 2024-08-30 RX ORDER — PROPRANOLOL HYDROCHLORIDE 20 MG/1
20 TABLET ORAL 2 TIMES DAILY
Qty: 60 TABLET | Refills: 0 | Status: SHIPPED | OUTPATIENT
Start: 2024-08-30

## 2024-08-30 ASSESSMENT — ENCOUNTER SYMPTOMS: HEADACHES: 1

## 2024-08-30 ASSESSMENT — PATIENT HEALTH QUESTIONNAIRE - PHQ9
SUM OF ALL RESPONSES TO PHQ QUESTIONS 1-9: 19
10. IF YOU CHECKED OFF ANY PROBLEMS, HOW DIFFICULT HAVE THESE PROBLEMS MADE IT FOR YOU TO DO YOUR WORK, TAKE CARE OF THINGS AT HOME, OR GET ALONG WITH OTHER PEOPLE: VERY DIFFICULT
SUM OF ALL RESPONSES TO PHQ QUESTIONS 1-9: 19

## 2024-08-30 NOTE — PROGRESS NOTES
"  Assessment & Plan     KWASI (generalized anxiety disorder)  Patient has had longstanding anxiety, unfortunately had bad experience on Lexapro and Xanax and has not wanted to try medication since then.  He is meeting with a counselor weekly which has been helpful.  Would be interested in meeting with psychiatry to potentially discuss other therapy versus medication options.  I have encouraged him to consider undergoing neuropsych testing, question of underlying autism versus ADHD.  Discussed that this may give us ideas for different types of therapy.  He does have a brother with bipolar disorder.   - Adult Mental Health  Referral  - Adult Mental Health  Referral    Other migraine without status migrainosus, not intractable  Trial of low-dose propranolol, he will let me know how he is doing.  I encouraged him to get a better sleep regimen, regular meals and make sure he is drinking adequate water  - propranolol (INDERAL) 20 MG tablet  Dispense: 60 tablet; Refill: 0            BMI  Estimated body mass index is 37.36 kg/m  as calculated from the following:    Height as of this encounter: 1.753 m (5' 9\").    Weight as of this encounter: 114.8 kg (253 lb).   Weight management plan: Discussed healthy diet and exercise guidelines    Depression Screening Follow Up        8/30/2024     1:45 PM   PHQ   PHQ-9 Total Score 19   Q9: Thoughts of better off dead/self-harm past 2 weeks More than half the days   F/U: Thoughts of suicide or self-harm Yes   F/U: Self harm-plan No   F/U: Self-harm action No   F/U: Safety concerns Yes                No data to display                Follow Up Actions Taken  Crisis resource information provided in the After Visit Summary  Mental Health Referral placed    Discussed the following ways the patient can remain in a safe environment:  be around others      Jose Alberto Bauer is a 34 year old, presenting for the following health issues:  Headache        8/30/2024     1:50 PM " "  Additional Questions   Roomed by Dina CARPIO MA     History of Present Illness       Headaches:   Since the patient's last clinic visit, headaches are: no change  The patient is getting headaches:  Every day  He is not able to do normal daily activities when he has a migraine.  The patient is taking the following rescue/relief medications:  Naproxyn (Aleve)   Patient states \"I get some relief\" from the rescue/relief medications.   The patient is taking the following medications to prevent migraines:  No medications to prevent migraines  In the past 4 weeks, the patient has gone to an Urgent Care or Emergency Room 0 times times due to headaches.    He eats 2-3 servings of fruits and vegetables daily.He consumes 0 sweetened beverage(s) daily.He exercises with enough effort to increase his heart rate 20 to 29 minutes per day.  He exercises with enough effort to increase his heart rate 4 days per week.   He is taking medications regularly.         Review of Systems  Constitutional, HEENT, cardiovascular, pulmonary, gi and gu systems are negative, except as otherwise noted.      Objective    /68   Pulse 109   Resp 16   Ht 1.753 m (5' 9\")   Wt 114.8 kg (253 lb)   SpO2 97%   BMI 37.36 kg/m    Body mass index is 37.36 kg/m .  Physical Exam  Constitutional:       Appearance: Normal appearance.   HENT:      Head: Normocephalic.      Right Ear: Tympanic membrane normal.      Left Ear: Tympanic membrane normal.      Mouth/Throat:      Mouth: Mucous membranes are moist.   Eyes:      Conjunctiva/sclera: Conjunctivae normal.   Cardiovascular:      Rate and Rhythm: Normal rate and regular rhythm.      Pulses: Normal pulses.   Pulmonary:      Effort: Pulmonary effort is normal.      Breath sounds: Normal breath sounds.   Abdominal:      General: Bowel sounds are normal.   Skin:     General: Skin is warm and dry.   Neurological:      General: No focal deficit present.      Mental Status: He is alert.   Psychiatric:         " Thought Content: Thought content normal.         Judgment: Judgment normal.            Signed Electronically by: CARMINE FRAUSTO DO

## 2024-10-09 DIAGNOSIS — G43.809 OTHER MIGRAINE WITHOUT STATUS MIGRAINOSUS, NOT INTRACTABLE: ICD-10-CM

## 2024-10-09 RX ORDER — PROPRANOLOL HCL 20 MG
20 TABLET ORAL 2 TIMES DAILY
Qty: 60 TABLET | Refills: 0 | Status: SHIPPED | OUTPATIENT
Start: 2024-10-09 | End: 2024-10-21

## 2024-10-09 NOTE — TELEPHONE ENCOUNTER
Medication Question or Refill    Contacts       Contact Date/Time Type Contact Phone/Fax    10/09/2024 01:59 PM CDT Phone (Incoming) Juancarlos Alves (Self) [phone] (H)          What medication are you calling about (include dose and sig)?: propranolol (INDERAL) 20 MG tablet     Preferred Pharmacy:   [facility] - [facility] [address] [address]  Phone: [phone] Fax: [phone]    Controlled Substance Agreement on file:   CSA -- Patient Level:    CSA: None found at the patient level.       Who prescribed the medication?: Martinez    Do you need a refill? Yes    When did you use the medication last? ?    Patient offered an appointment? No    Do you have any questions or concerns?  No    Okay to leave a detailed message?: Yes at Cell number on file:    Telephone Information:   Mobile [phone]

## 2024-10-21 ENCOUNTER — VIRTUAL VISIT (OUTPATIENT)
Dept: FAMILY MEDICINE | Facility: CLINIC | Age: 34
End: 2024-10-21
Payer: COMMERCIAL

## 2024-10-21 DIAGNOSIS — R10.31 RIGHT GROIN PAIN: Primary | ICD-10-CM

## 2024-10-21 DIAGNOSIS — G43.809 OTHER MIGRAINE WITHOUT STATUS MIGRAINOSUS, NOT INTRACTABLE: ICD-10-CM

## 2024-10-21 PROCEDURE — 99214 OFFICE O/P EST MOD 30 MIN: CPT | Mod: 95 | Performed by: FAMILY MEDICINE

## 2024-10-21 RX ORDER — PROPRANOLOL HCL 20 MG
20 TABLET ORAL 2 TIMES DAILY
Qty: 180 TABLET | Refills: 3 | Status: SHIPPED | OUTPATIENT
Start: 2024-10-21 | End: 2025-10-16

## 2024-10-21 NOTE — PROGRESS NOTES
Juancarlos is a 34 year old who is being evaluated via a billable video visit.    How would you like to obtain your AVS? MyChart  If the video visit is dropped, the invitation should be resent by: Text to cell phone: [phone]  Will anyone else be joining your video visit? No    Assessment & Plan     Right groin pain  ? Of possible hernia, he is able to reproduce pain with straining. IF positive will refer to surgery. If negative can consider MSK causes.   - US Hernia Evaluation    Other migraine without status migrainosus, not intractable  Tolerating well and controlling symptoms, refills provided  - propranolol (INDERAL) 20 MG tablet; Take 1 tablet (20 mg) by mouth 2 times daily.  Dispense: 180 tablet; Refill: 3          Subjective   Juancarlos is a 34 year old, presenting for the following health issues:  Musculoskeletal Problem        10/21/2024     9:46 AM   Additional Questions   Roomed by Dina CARPIO MA   Accompanied by Self     Video Start Time: 10:08 AM    HPI     Pinching in leg for 4-5 days (inside of right leg, running towards glutes and   has also been on left side, but not as bad)     Pain History:    Where in your body do you have pain? Musculoskeletal problem/pain  Onset/Duration: 1 week  Description  Location: Leg - right, but also in left leg a little  Joint Swelling: No  Redness: No  Pain: YES  Warmth: No  Intensity:  mild  Progression of Symptoms:  improving  Accompanying signs and symptoms:   Fevers: No  Numbness/tingling/weakness: YES- some numbness going into right foot  History  Trauma to the area: No  Recent illness:  No  Previous similar problem: No  Previous evaluation:  No  Precipitating or alleviating factors:  Aggravating factors include: none  Therapies tried and outcome: nothing    States the same thing happened years ago. Noticed bulge into his groin during this time-worse with straining.       Review of Systems  Constitutional, HEENT, cardiovascular, pulmonary, gi and gu systems are negative,  except as otherwise noted.      Objective           Vitals:  No vitals were obtained today due to virtual visit.    Physical Exam   GENERAL: alert and no distress  EYES: Eyes grossly normal to inspection.  No discharge or erythema, or obvious scleral/conjunctival abnormalities.  RESP: No audible wheeze, cough, or visible cyanosis.    SKIN: Visible skin clear. No significant rash, abnormal pigmentation or lesions.  NEURO: Cranial nerves grossly intact.  Mentation and speech appropriate for age.  PSYCH: Appropriate affect, tone, and pace of words        Video-Visit Details    Type of service:  Video Visit   Video End Time:10:26 AM  Originating Location (pt. Location): Home  Distant Location (provider location):  Off-site  Platform used for Video Visit: Rosy  Signed Electronically by: CARMINE FRAUSTO DO

## 2024-10-22 ENCOUNTER — HOSPITAL ENCOUNTER (OUTPATIENT)
Dept: ULTRASOUND IMAGING | Facility: CLINIC | Age: 34
Discharge: HOME | End: 2024-10-22
Attending: FAMILY MEDICINE | Admitting: FAMILY MEDICINE
Payer: COMMERCIAL

## 2024-10-22 DIAGNOSIS — R10.31 RIGHT GROIN PAIN: ICD-10-CM

## 2024-10-22 PROCEDURE — 76705 ECHO EXAM OF ABDOMEN: CPT

## 2024-10-25 ENCOUNTER — PATIENT OUTREACH (OUTPATIENT)
Dept: CARE COORDINATION | Facility: CLINIC | Age: 34
End: 2024-10-25
Payer: COMMERCIAL

## 2024-10-28 ENCOUNTER — PATIENT OUTREACH (OUTPATIENT)
Dept: CARE COORDINATION | Facility: CLINIC | Age: 34
End: 2024-10-28
Payer: COMMERCIAL

## 2024-11-03 ENCOUNTER — HEALTH MAINTENANCE LETTER (OUTPATIENT)
Age: 34
End: 2024-11-03

## 2024-11-25 ENCOUNTER — VIRTUAL VISIT (OUTPATIENT)
Dept: FAMILY MEDICINE | Facility: CLINIC | Age: 34
End: 2024-11-25
Payer: COMMERCIAL

## 2024-11-25 DIAGNOSIS — Z13.220 LIPID SCREENING: ICD-10-CM

## 2024-11-25 DIAGNOSIS — M54.2 NECK PAIN: ICD-10-CM

## 2024-11-25 DIAGNOSIS — M79.601 BILATERAL ARM PAIN: Primary | ICD-10-CM

## 2024-11-25 DIAGNOSIS — Z13.1 ENCOUNTER FOR SCREENING EXAMINATION FOR IMPAIRED GLUCOSE REGULATION AND DIABETES MELLITUS: ICD-10-CM

## 2024-11-25 DIAGNOSIS — M79.602 BILATERAL ARM PAIN: Primary | ICD-10-CM

## 2024-11-25 PROCEDURE — 99214 OFFICE O/P EST MOD 30 MIN: CPT | Mod: 95 | Performed by: FAMILY MEDICINE

## 2024-11-25 NOTE — PROGRESS NOTES
Juancarlos is a 34 year old who is being evaluated via a billable video visit.    How would you like to obtain your AVS? MyChart  If the video visit is dropped, the invitation should be resent by: Text to cell phone: [phone]  Will anyone else be joining your video visit? No    Assessment & Plan     Bilateral arm pain  ? Of cervical impingement, recommend x maricel nd TP if not responding will have him meet with spine  - XR Cervical Spine 2/3 Views  - Physical Therapy  Referral    Neck pain  - XR Cervical Spine 2/3 Views  - Physical Therapy  Referral    Lipid screening  - Lipid panel reflex to direct LDL Fasting    Encounter for screening examination for impaired glucose regulation and diabetes mellitus  - Hemoglobin A1c              Subjective   Juancarlos is a 34 year old, presenting for the following health issues:  Results        11/25/2024    11:28 AM   Additional Questions   Roomed by Dina CARPIO MA   Accompanied by Self       Video Start Time: 11:35 AM    History of Present Illness       Reason for visit:  Follow up appointment    He eats 2-3 servings of fruits and vegetables daily.He consumes 0 sweetened beverage(s) daily.He exercises with enough effort to increase his heart rate 20 to 29 minutes per day.  He exercises with enough effort to increase his heart rate 3 or less days per week.   He is taking medications regularly.     Follow up on the ultrasound that he had done on 10/22/2024          Review of Systems  Constitutional, HEENT, cardiovascular, pulmonary, gi and gu systems are negative, except as otherwise noted.      Objective           Vitals:  No vitals were obtained today due to virtual visit.    Physical Exam   GENERAL: alert and no distress  EYES: Eyes grossly normal to inspection.  No discharge or erythema, or obvious scleral/conjunctival abnormalities.  RESP: No audible wheeze, cough, or visible cyanosis.    SKIN: Visible skin clear. No significant rash, abnormal pigmentation or  lesions.  NEURO: Cranial nerves grossly intact.  Mentation and speech appropriate for age.  PSYCH: Appropriate affect, tone, and pace of words          Video-Visit Details    Type of service:  Video Visit   Video End Time:11:48 AM  Originating Location (pt. Location): Home  Distant Location (provider location):  Off-site  Platform used for Video Visit: Rosy  Signed Electronically by: CARMINE FRAUSTO DO

## 2025-01-15 ENCOUNTER — ANCILLARY PROCEDURE (OUTPATIENT)
Dept: GENERAL RADIOLOGY | Facility: CLINIC | Age: 35
End: 2025-01-15
Attending: FAMILY MEDICINE
Payer: COMMERCIAL

## 2025-01-15 DIAGNOSIS — M79.601 BILATERAL ARM PAIN: ICD-10-CM

## 2025-01-15 DIAGNOSIS — M79.602 BILATERAL ARM PAIN: ICD-10-CM

## 2025-01-15 DIAGNOSIS — M54.2 NECK PAIN: ICD-10-CM

## 2025-01-15 PROCEDURE — 72040 X-RAY EXAM NECK SPINE 2-3 VW: CPT | Mod: TC | Performed by: RADIOLOGY
